# Patient Record
Sex: FEMALE | Race: WHITE | Employment: OTHER | URBAN - METROPOLITAN AREA
[De-identification: names, ages, dates, MRNs, and addresses within clinical notes are randomized per-mention and may not be internally consistent; named-entity substitution may affect disease eponyms.]

---

## 2019-01-10 ENCOUNTER — HOSPITAL ENCOUNTER (EMERGENCY)
Age: 59
Discharge: HOME | End: 2019-01-10
Attending: EMERGENCY MEDICINE
Payer: COMMERCIAL

## 2019-01-10 VITALS
SYSTOLIC BLOOD PRESSURE: 138 MMHG | HEIGHT: 62 IN | DIASTOLIC BLOOD PRESSURE: 89 MMHG | OXYGEN SATURATION: 98 % | HEART RATE: 75 BPM | BODY MASS INDEX: 36.8 KG/M2 | WEIGHT: 200 LBS | RESPIRATION RATE: 18 BRPM | TEMPERATURE: 98.2 F

## 2019-01-10 DIAGNOSIS — K05.10 GINGIVITIS: ICD-10-CM

## 2019-01-10 DIAGNOSIS — K04.7 DENTAL ABSCESS: Primary | ICD-10-CM

## 2019-01-10 DIAGNOSIS — K02.9 DENTAL DECAY: ICD-10-CM

## 2019-01-10 DIAGNOSIS — H92.09 OTALGIA, UNSPECIFIED LATERALITY: ICD-10-CM

## 2019-01-10 PROCEDURE — 6370000000 HC RX 637 (ALT 250 FOR IP): Performed by: EMERGENCY MEDICINE

## 2019-01-10 PROCEDURE — 99283 EMERGENCY DEPT VISIT LOW MDM: CPT

## 2019-01-10 RX ORDER — LOSARTAN POTASSIUM 50 MG/1
50 TABLET ORAL DAILY
COMMUNITY

## 2019-01-10 RX ORDER — HYDROCHLOROTHIAZIDE 25 MG/1
12.5 TABLET ORAL DAILY
COMMUNITY
End: 2021-02-16

## 2019-01-10 RX ORDER — OXYCODONE HYDROCHLORIDE AND ACETAMINOPHEN 5; 325 MG/1; MG/1
1 TABLET ORAL ONCE
Status: COMPLETED | OUTPATIENT
Start: 2019-01-10 | End: 2019-01-10

## 2019-01-10 RX ORDER — CLINDAMYCIN HYDROCHLORIDE 300 MG/1
300 CAPSULE ORAL 4 TIMES DAILY
Qty: 40 CAPSULE | Refills: 0 | Status: SHIPPED | OUTPATIENT
Start: 2019-01-10 | End: 2019-01-20

## 2019-01-10 RX ORDER — OXYCODONE HYDROCHLORIDE AND ACETAMINOPHEN 5; 325 MG/1; MG/1
1-2 TABLET ORAL EVERY 6 HOURS PRN
Qty: 20 TABLET | Refills: 0 | Status: SHIPPED | OUTPATIENT
Start: 2019-01-10 | End: 2019-01-13

## 2019-01-10 RX ORDER — CLINDAMYCIN HYDROCHLORIDE 150 MG/1
300 CAPSULE ORAL ONCE
Status: COMPLETED | OUTPATIENT
Start: 2019-01-10 | End: 2019-01-10

## 2019-01-10 RX ORDER — CHLORHEXIDINE GLUCONATE 0.12 MG/ML
15 RINSE ORAL 2 TIMES DAILY
Qty: 420 ML | Refills: 0 | Status: SHIPPED | OUTPATIENT
Start: 2019-01-10 | End: 2019-01-24

## 2019-01-10 RX ORDER — ATORVASTATIN CALCIUM 40 MG/1
40 TABLET, FILM COATED ORAL DAILY
COMMUNITY

## 2019-01-10 RX ORDER — LANOLIN ALCOHOL/MO/W.PET/CERES
1000 CREAM (GRAM) TOPICAL DAILY
COMMUNITY

## 2019-01-10 RX ADMIN — CLINDAMYCIN HYDROCHLORIDE 300 MG: 150 CAPSULE ORAL at 20:03

## 2019-01-10 RX ADMIN — OXYCODONE HYDROCHLORIDE AND ACETAMINOPHEN 1 TABLET: 5; 325 TABLET ORAL at 20:03

## 2019-01-10 ASSESSMENT — PAIN DESCRIPTION - ORIENTATION
ORIENTATION: RIGHT
ORIENTATION: RIGHT

## 2019-01-10 ASSESSMENT — PAIN SCALES - GENERAL
PAINLEVEL_OUTOF10: 4
PAINLEVEL_OUTOF10: 5
PAINLEVEL_OUTOF10: 5

## 2019-01-10 ASSESSMENT — PAIN DESCRIPTION - DESCRIPTORS
DESCRIPTORS: ACHING
DESCRIPTORS: CONSTANT;ACHING

## 2019-01-10 ASSESSMENT — ENCOUNTER SYMPTOMS
EYE PAIN: 0
SORE THROAT: 0
RHINORRHEA: 0
CONSTIPATION: 0
WHEEZING: 0
FACIAL SWELLING: 1
COLOR CHANGE: 0
SHORTNESS OF BREATH: 0
ABDOMINAL DISTENTION: 0
DIARRHEA: 0
BACK PAIN: 0
SINUS PRESSURE: 0
PHOTOPHOBIA: 0
APNEA: 0
COUGH: 0
NAUSEA: 0
ABDOMINAL PAIN: 0
VOMITING: 0

## 2019-01-10 ASSESSMENT — PAIN - FUNCTIONAL ASSESSMENT: PAIN_FUNCTIONAL_ASSESSMENT: 0-10

## 2019-01-10 ASSESSMENT — PAIN DESCRIPTION - ONSET
ONSET: ON-GOING
ONSET: ON-GOING

## 2019-01-10 ASSESSMENT — PAIN DESCRIPTION - PAIN TYPE
TYPE: ACUTE PAIN
TYPE: ACUTE PAIN

## 2019-01-10 ASSESSMENT — PAIN DESCRIPTION - PROGRESSION
CLINICAL_PROGRESSION: GRADUALLY WORSENING
CLINICAL_PROGRESSION: GRADUALLY IMPROVING

## 2019-01-10 ASSESSMENT — PAIN DESCRIPTION - FREQUENCY
FREQUENCY: CONTINUOUS
FREQUENCY: CONTINUOUS

## 2019-01-10 ASSESSMENT — PAIN DESCRIPTION - LOCATION
LOCATION: EAR;JAW
LOCATION: TEETH

## 2019-04-24 ENCOUNTER — HOSPITAL ENCOUNTER (OUTPATIENT)
Dept: WOMENS IMAGING | Age: 59
Discharge: HOME | End: 2019-04-26
Payer: COMMERCIAL

## 2019-04-24 DIAGNOSIS — Z12.31 ENCOUNTER FOR SCREENING MAMMOGRAM FOR BREAST CANCER: ICD-10-CM

## 2019-04-24 PROCEDURE — 77067 SCR MAMMO BI INCL CAD: CPT

## 2019-05-05 ENCOUNTER — HOSPITAL ENCOUNTER (EMERGENCY)
Age: 59
Discharge: HOME | End: 2019-05-05
Attending: EMERGENCY MEDICINE
Payer: COMMERCIAL

## 2019-05-05 ENCOUNTER — APPOINTMENT (OUTPATIENT)
Dept: GENERAL RADIOLOGY | Age: 59
End: 2019-05-05
Payer: COMMERCIAL

## 2019-05-05 VITALS
BODY MASS INDEX: 34.96 KG/M2 | OXYGEN SATURATION: 95 % | SYSTOLIC BLOOD PRESSURE: 119 MMHG | DIASTOLIC BLOOD PRESSURE: 61 MMHG | HEIGHT: 62 IN | WEIGHT: 190 LBS | RESPIRATION RATE: 18 BRPM | HEART RATE: 73 BPM | TEMPERATURE: 98.3 F

## 2019-05-05 DIAGNOSIS — N39.0 ACUTE UTI: ICD-10-CM

## 2019-05-05 DIAGNOSIS — E08.42 DIABETIC POLYNEUROPATHY ASSOCIATED WITH DIABETES MELLITUS DUE TO UNDERLYING CONDITION (HCC): Primary | ICD-10-CM

## 2019-05-05 LAB
ALBUMIN SERPL-MCNC: 4.3 G/DL (ref 3.5–4.6)
ALP BLD-CCNC: 54 U/L (ref 40–130)
ALT SERPL-CCNC: 33 U/L (ref 0–33)
ANION GAP SERPL CALCULATED.3IONS-SCNC: 16 MEQ/L (ref 9–15)
AST SERPL-CCNC: 34 U/L (ref 0–35)
BACTERIA: ABNORMAL /HPF
BASOPHILS ABSOLUTE: 0 K/UL (ref 0–0.2)
BASOPHILS RELATIVE PERCENT: 0.4 %
BILIRUB SERPL-MCNC: 0.9 MG/DL (ref 0.2–0.7)
BILIRUBIN URINE: NEGATIVE
BLOOD, URINE: ABNORMAL
BUN BLDV-MCNC: 23 MG/DL (ref 8–23)
CALCIUM SERPL-MCNC: 10 MG/DL (ref 8.5–9.9)
CHLORIDE BLD-SCNC: 100 MEQ/L (ref 95–107)
CLARITY: CLEAR
CO2: 25 MEQ/L (ref 20–31)
COLOR: YELLOW
CREAT SERPL-MCNC: 0.61 MG/DL (ref 0.5–0.9)
EOSINOPHILS ABSOLUTE: 0.1 K/UL (ref 0–0.7)
EOSINOPHILS RELATIVE PERCENT: 3 %
EPITHELIAL CELLS, UA: ABNORMAL /HPF
GFR AFRICAN AMERICAN: >60
GFR NON-AFRICAN AMERICAN: >60
GLOBULIN: 3.7 G/DL (ref 2.3–3.5)
GLUCOSE BLD-MCNC: 147 MG/DL (ref 70–99)
GLUCOSE URINE: NEGATIVE MG/DL
HCT VFR BLD CALC: 43.3 % (ref 37–47)
HEMOGLOBIN: 14.4 G/DL (ref 12–16)
KETONES, URINE: NEGATIVE MG/DL
LEUKOCYTE ESTERASE, URINE: ABNORMAL
LYMPHOCYTES ABSOLUTE: 1.2 K/UL (ref 1–4.8)
LYMPHOCYTES RELATIVE PERCENT: 27.6 %
MAGNESIUM: 2.2 MG/DL (ref 1.7–2.4)
MCH RBC QN AUTO: 29.5 PG (ref 27–31.3)
MCHC RBC AUTO-ENTMCNC: 33.3 % (ref 33–37)
MCV RBC AUTO: 88.7 FL (ref 82–100)
MONOCYTES ABSOLUTE: 0.5 K/UL (ref 0.2–0.8)
MONOCYTES RELATIVE PERCENT: 11.7 %
NEUTROPHILS ABSOLUTE: 2.6 K/UL (ref 1.4–6.5)
NEUTROPHILS RELATIVE PERCENT: 57.3 %
NITRITE, URINE: NEGATIVE
PDW BLD-RTO: 13.3 % (ref 11.5–14.5)
PH UA: 6 (ref 5–9)
PLATELET # BLD: 226 K/UL (ref 130–400)
POTASSIUM SERPL-SCNC: 3.6 MEQ/L (ref 3.4–4.9)
PROTEIN UA: 30 MG/DL
RBC # BLD: 4.88 M/UL (ref 4.2–5.4)
RBC UA: ABNORMAL /HPF (ref 0–2)
SODIUM BLD-SCNC: 141 MEQ/L (ref 135–144)
SPECIFIC GRAVITY UA: 1.02 (ref 1–1.03)
TOTAL PROTEIN: 8 G/DL (ref 6.3–8)
URINE REFLEX TO CULTURE: YES
UROBILINOGEN, URINE: 0.2 E.U./DL
WBC # BLD: 4.5 K/UL (ref 4.8–10.8)
WBC UA: ABNORMAL /HPF (ref 0–5)

## 2019-05-05 PROCEDURE — 36415 COLL VENOUS BLD VENIPUNCTURE: CPT

## 2019-05-05 PROCEDURE — 81001 URINALYSIS AUTO W/SCOPE: CPT

## 2019-05-05 PROCEDURE — 80053 COMPREHEN METABOLIC PANEL: CPT

## 2019-05-05 PROCEDURE — 87086 URINE CULTURE/COLONY COUNT: CPT

## 2019-05-05 PROCEDURE — 2580000003 HC RX 258: Performed by: EMERGENCY MEDICINE

## 2019-05-05 PROCEDURE — 96374 THER/PROPH/DIAG INJ IV PUSH: CPT

## 2019-05-05 PROCEDURE — 6360000002 HC RX W HCPCS: Performed by: EMERGENCY MEDICINE

## 2019-05-05 PROCEDURE — 99284 EMERGENCY DEPT VISIT MOD MDM: CPT

## 2019-05-05 PROCEDURE — 85025 COMPLETE CBC W/AUTO DIFF WBC: CPT

## 2019-05-05 PROCEDURE — 83735 ASSAY OF MAGNESIUM: CPT

## 2019-05-05 PROCEDURE — 96375 TX/PRO/DX INJ NEW DRUG ADDON: CPT

## 2019-05-05 PROCEDURE — 72110 X-RAY EXAM L-2 SPINE 4/>VWS: CPT

## 2019-05-05 RX ORDER — KETOROLAC TROMETHAMINE 30 MG/ML
30 INJECTION, SOLUTION INTRAMUSCULAR; INTRAVENOUS ONCE
Status: COMPLETED | OUTPATIENT
Start: 2019-05-05 | End: 2019-05-05

## 2019-05-05 RX ORDER — NITROFURANTOIN 25; 75 MG/1; MG/1
100 CAPSULE ORAL 2 TIMES DAILY
Qty: 14 CAPSULE | Refills: 0 | Status: SHIPPED | OUTPATIENT
Start: 2019-05-05 | End: 2019-05-09

## 2019-05-05 RX ORDER — CLOPIDOGREL BISULFATE 75 MG/1
75 TABLET ORAL DAILY
COMMUNITY
End: 2019-11-10

## 2019-05-05 RX ORDER — 0.9 % SODIUM CHLORIDE 0.9 %
1000 INTRAVENOUS SOLUTION INTRAVENOUS ONCE
Status: COMPLETED | OUTPATIENT
Start: 2019-05-05 | End: 2019-05-05

## 2019-05-05 RX ORDER — AMLODIPINE BESYLATE 5 MG/1
5 TABLET ORAL DAILY
COMMUNITY

## 2019-05-05 RX ORDER — METHYLPREDNISOLONE SODIUM SUCCINATE 125 MG/2ML
125 INJECTION, POWDER, LYOPHILIZED, FOR SOLUTION INTRAMUSCULAR; INTRAVENOUS ONCE
Status: COMPLETED | OUTPATIENT
Start: 2019-05-05 | End: 2019-05-05

## 2019-05-05 RX ORDER — GABAPENTIN 100 MG/1
100 CAPSULE ORAL 3 TIMES DAILY
Qty: 90 CAPSULE | Refills: 0 | Status: SHIPPED | OUTPATIENT
Start: 2019-05-05 | End: 2019-11-10

## 2019-05-05 RX ADMIN — METHYLPREDNISOLONE SODIUM SUCCINATE 125 MG: 125 INJECTION, POWDER, FOR SOLUTION INTRAMUSCULAR; INTRAVENOUS at 13:48

## 2019-05-05 RX ADMIN — KETOROLAC TROMETHAMINE 30 MG: 30 INJECTION, SOLUTION INTRAMUSCULAR at 13:44

## 2019-05-05 RX ADMIN — SODIUM CHLORIDE 1000 ML: 9 INJECTION, SOLUTION INTRAVENOUS at 13:44

## 2019-05-05 ASSESSMENT — PAIN - FUNCTIONAL ASSESSMENT
PAIN_FUNCTIONAL_ASSESSMENT: ACTIVITIES ARE NOT PREVENTED
PAIN_FUNCTIONAL_ASSESSMENT: PREVENTS OR INTERFERES SOME ACTIVE ACTIVITIES AND ADLS
PAIN_FUNCTIONAL_ASSESSMENT: 0-10

## 2019-05-05 ASSESSMENT — ENCOUNTER SYMPTOMS
VOMITING: 0
SHORTNESS OF BREATH: 0
PHOTOPHOBIA: 0
COLOR CHANGE: 0
SINUS PRESSURE: 0
COUGH: 0
CONSTIPATION: 0
ABDOMINAL PAIN: 0
WHEEZING: 0
NAUSEA: 0
ABDOMINAL DISTENTION: 0
BACK PAIN: 0
DIARRHEA: 0
EYE PAIN: 0
APNEA: 0
SORE THROAT: 0
RHINORRHEA: 0

## 2019-05-05 ASSESSMENT — PAIN DESCRIPTION - PROGRESSION
CLINICAL_PROGRESSION: GRADUALLY IMPROVING
CLINICAL_PROGRESSION: NOT CHANGED

## 2019-05-05 ASSESSMENT — PAIN DESCRIPTION - LOCATION
LOCATION: FOOT
LOCATION: FOOT

## 2019-05-05 ASSESSMENT — PAIN DESCRIPTION - FREQUENCY
FREQUENCY: CONTINUOUS
FREQUENCY: CONTINUOUS

## 2019-05-05 ASSESSMENT — PAIN DESCRIPTION - PAIN TYPE
TYPE: ACUTE PAIN
TYPE: ACUTE PAIN

## 2019-05-05 ASSESSMENT — PAIN DESCRIPTION - ORIENTATION
ORIENTATION: RIGHT;LEFT
ORIENTATION: RIGHT;LEFT;POSTERIOR

## 2019-05-05 ASSESSMENT — PAIN DESCRIPTION - DESCRIPTORS
DESCRIPTORS: DULL;NUMBNESS
DESCRIPTORS: NUMBNESS;DULL

## 2019-05-05 ASSESSMENT — PAIN DESCRIPTION - ONSET
ONSET: ON-GOING
ONSET: ON-GOING

## 2019-05-05 ASSESSMENT — PAIN SCALES - GENERAL
PAINLEVEL_OUTOF10: 3
PAINLEVEL_OUTOF10: 5

## 2019-05-05 NOTE — ED NOTES
Patient presents with bilateral foot numbness started a few months ago and has increased over the past 3 days. The patient does have some hard nodules noted at the tendon posterior bilateral heels. These are semi firm and moves slightly when touched. No redness or swelling of feet noted. Good pedal and post tib pulses bilaterally. Patient changed to gown and positioned for comfort. Call light within reach.       Micaela Tamez RN  05/05/19 9773

## 2019-05-05 NOTE — ED PROVIDER NOTES
[address] [place] [address] ED  eMERGENCY dEPARTMENT eNCOUnter      Pt Name: Jenna Soria  MRN: [id number]  [place] 1960  Date of evaluation: 5/5/2019  Provider: Radha Veliz MD    [address] [place] [address]       Chief Complaint   Patient presents with    Numbness     biateral feet numbness         HISTORY OF PRESENT ILLNESS   (Location/Symptom, Timing/Onset,Context/Setting, Quality, Duration, Modifying Factors, Severity)  Note limiting factors. Jenna Soria is a 62 y.o. female who presents to the emergency department with complaint of bilateral feet numbness. Patient of long standing type 2 diabetes, hypertension, hyperlipidemia, CVA and asthma. Has had numbness and feet pain for over 6 months. Pain is 4 in a scale of 1 to 10. HPI    Nursing Notes were reviewed. REVIEW OF SYSTEMS    (2-9 systems for level 4, 10 or more for level 5)     Review of Systems   Constitutional: Negative. Negative for activity change, appetite change, chills, fatigue and fever. HENT: Negative for congestion, ear discharge, ear pain, hearing loss, rhinorrhea, sinus pressure and sore throat. Eyes: Negative for photophobia, pain and visual disturbance. Respiratory: Negative for apnea, cough, shortness of breath and wheezing. Cardiovascular: Negative for chest pain, palpitations and leg swelling. Gastrointestinal: Negative for abdominal distention, abdominal pain, constipation, diarrhea, nausea and vomiting. Endocrine: Negative for cold intolerance, heat intolerance and polyuria. Genitourinary: Negative for dysuria, flank pain, frequency and urgency. Musculoskeletal: Negative for arthralgias, back pain, gait problem, myalgias and neck stiffness. Skin: Negative for color change, pallor and rash. Allergic/Immunologic: Negative for food allergies and immunocompromised state. Neurological: Positive for numbness. Negative for dizziness, tremors, syncope, weakness, light-headedness and headaches.    Hematological: Negative for adenopathy. Does not bruise/bleed easily. Psychiatric/Behavioral: Negative for agitation, confusion and hallucinations. All other systems reviewed and are negative. Except as noted above the remainder of the review of systems was reviewed and negative. PAST MEDICAL HISTORY     Past Medical History:   Diagnosis Date    Asthma     [facility]erebral artery occlusion with [facility] infarction ([facility] [address]     Diabetes mellitus ([facility] [address]     Hyperlipidemia     Hypertension          SURGICAL HISTORY       Past Surgical History:   Procedure Laterality Date    CARPAL TUNNEL RELEASE Bilateral     DILATION AND CURETTAGE OF UTERUS      TUBAL LIGATION           CURRENT MEDICATIONS       Previous Medications    AMLODIPINE (NORVASC) 5 MG TABLET    Take 5 mg by mouth daily    ASPIRIN 81 MG TABLET    Take 81 mg by mouth daily    ATORVASTATIN (LIPITOR) 40 MG TABLET    Take 40 mg by mouth daily    CLOPIDOGREL (PLAVIX) 75 MG TABLET    Take 75 mg by mouth daily    HYDROCHLOROTHIAZIDE (HYDRODIURIL) 25 MG TABLET    Take 12.5 mg by mouth daily     LOSARTAN (COZAAR) 50 MG TABLET    Take 50 mg by mouth daily    METFORMIN (GLUCOPHAGE) 500 MG TABLET    Take 500 mg by mouth 2 times daily (with meals)    NEOMYCIN-POLYMYXIN-HYDROCORTISONE (CORTISPORIN) 3.5-12735-1 OTIC SOLUTION    3 drops 4 times daily    OMEGA-3 FATTY ACIDS (OMEGA 3 500 PO)    Take by mouth    SERTRALINE (ZOLOFT) 50 MG TABLET    Take 50 mg by mouth daily    VITAMIN B-12 (CYANOCOBALAMIN) 1000 MCG TABLET    Take 1,000 mcg by mouth daily    VITAMIN D (CHOLECALCIFEROL) 1000 UNIT TABS TABLET    Take 1,000 Units by mouth daily       ALLERGIES         FAMILY HISTORY     History reviewed. No pertinent family history.        SOCIAL HISTORY       Social History     Socioeconomic History    Marital status:      Spouse name: None    Number of children: None    Years of education: None    Highest education level: None   Occupational History    None   Social Needs  Financial resource strain: None    Food insecurity:     Worry: None     Inability: None    Transportation needs:     Medical: None     Non-medical: None   Tobacco Use    Smoking status: Never Smoker    Smokeless tobacco: Never Used   Substance and Sexual Activity    Alcohol use: No    Drug use: No    Sexual activity: Yes     Partners: Male   Lifestyle    Physical activity:     Days per week: None     Minutes per session: None    Stress: None   Relationships    Social connections:     Talks on phone: None     Gets together: None     Attends Quaker service: None     Active member of club or organization: None     Attends meetings of clubs or organizations: None     Relationship status: None    Intimate partner violence:     Fear of current or ex partner: None     Emotionally abused: None     Physically abused: None     Forced sexual activity: None   Other Topics Concern    None   Social History Narrative    None       SCREENINGS             PHYSICAL EXAM    (up to 7 for level 4, 8 or more for level 5)     ED Triage Vitals [05/05/19 1322]   BP Temp Temp Source Pulse Resp SpO2 Height Weight   (!) 121/59 98.3 °F (36.8 °C) Oral 89 18 -- 5' 2\" (1.575 m) 190 lb (86.2 kg)       Physical Exam   Constitutional: She is oriented to person, place, and time. She appears well-developed and well-nourished. No distress. HENT:   Head: Normocephalic and atraumatic. Nose: Nose normal.   Mouth/Throat: Oropharynx is clear and moist. No oropharyngeal exudate. Eyes: Pupils are equal, round, and reactive to light. Conjunctivae and EOM are normal. Right eye exhibits no discharge. Left eye exhibits no discharge. No scleral icterus. Neck: Normal range of motion. Neck supple. No JVD present. No tracheal deviation present. No thyromegaly present. Cardiovascular: Normal rate, regular rhythm, normal heart sounds and intact distal pulses. Exam reveals no gallop and no friction rub. No murmur heard.   Pulmonary/Chest: Effort normal and breath sounds normal. No stridor. No respiratory distress. She has no wheezes. She has no rales. She exhibits no tenderness. Abdominal: Soft. Bowel sounds are normal. She exhibits no distension and no mass. There is no tenderness. There is no rebound and no guarding. Musculoskeletal: Normal range of motion. She exhibits no edema, tenderness or deformity. Lymphadenopathy:     She has no cervical adenopathy. Neurological: She is alert and oriented to person, place, and time. She has normal reflexes. She displays normal reflexes. No cranial nerve deficit. She exhibits normal muscle tone. Coordination normal.   Skin: Skin is warm and dry. No rash noted. She is not diaphoretic. No erythema. No pallor. Psychiatric: She has a normal mood and affect. Her behavior is normal. Judgment and thought content normal.   Nursing note and vitals reviewed.       DIAGNOSTIC RESULTS     EKG: All EKG's are interpreted by the Emergency Department Physician who either signs or Co-signs this chart in the absence of a cardiologist.        RADIOLOGY:   Non-plain film images such as CT, Ultrasound and MRI are read by the radiologist. Geraldine Borders radiographicimages are visualized and preliminarily interpreted by the emergency physician with the below findings:        Interpretation per the Radiologist below, if available at the time of this note:    XR LUMBAR SPINE (MIN 4 VIEWS)    (Results Pending)         ED BEDSIDE ULTRASOUND:   Performed by ED Physician - none    LABS:  Labs Reviewed   COMPREHENSIVE METABOLIC PANEL - Abnormal; Notable for the following components:       Result Value    Anion Gap 16 (*)     Glucose 147 (*)     Calcium 10.0 (*)     Total Bilirubin 0.9 (*)     Globulin 3.7 (*)     All other components within normal limits   CBC WITH AUTO DIFFERENTIAL - Abnormal; Notable for the following components:    WBC 4.5 (*)     All other components within normal limits   URINE RT REFLEX TO CULTURE - Abnormal; Notable for the following components:    Protein, UA 30 (*)     All other components within normal limits   MICROSCOPIC URINALYSIS - Abnormal; Notable for the following components:    RBC, UA 3-5 (*)     All other components within normal limits   URINE CULTURE   MAGNESIUM       All other labs were within normal range or not returned as of this dictation. EMERGENCY DEPARTMENT COURSE and DIFFERENTIALDIAGNOSIS/MDM:   Vitals:    Vitals:    05/05/19 1322   BP: (!) 121/59   Pulse: 89   Resp: 18   Temp: 98.3 °F (36.8 °C)   TempSrc: Oral   Weight: 190 lb (86.2 kg)   Height: 5' 2\" (1.575 m)           MDM  Number of Diagnoses or Management Options     Amount and/or Complexity of Data Reviewed  Clinical lab tests: reviewed and ordered  Tests in the radiology section of CPT®: reviewed and ordered    Risk of Complications, Morbidity, and/or Mortality  Presenting problems: moderate  Diagnostic procedures: moderate  Management options: moderate    Patient Progress  Patient progress: improved      CRITICAL CARE TIME   Total Critical Care time was  minutes, excluding separately reportable procedures. There was a high probability of clinically significant/life threatening deterioration in the patient's condition which required my urgentintervention. SULTS:  None    PROCEDURES:  Unless otherwise noted below, none     Procedures    FINAL IMPRESSION      1. Diabetic polyneuropathy associated with diabetes mellitus due to underlying condition ([facility] [address]    2. Acute UTI          DISPOSITION/PLAN   DISPOSITION Decision To Discharge 05/05/2019 02:30:07 PM      PATIENT REFERRED TO:  Adrian Toussaint MD  [address] [facility] [phone]    In 3 days      Radha Galvez DPM  [address] [facility]  [facility] [address]  [phone]    In 3 days        DISCHARGE MEDICATIONS:  New Prescriptions    GABAPENTIN (NEURONTIN) 100 MG CAPSULE    Take 1 capsule by mouth 3 times daily for 30 days.  Intended supply: 30 days NITROFURANTOIN, MACROCRYSTAL-MONOHYDRATE, (MACROBID) 100 MG CAPSULE    Take 1 capsule by mouth 2 times daily for 7 doses          (Please note that portions of this note were completed with a voice recognitionprogram.  Efforts were made to edit the dictations but occasionally words are mis-transcribed.)    Brody Barreto MD (electronically signed)  Attending Emergency Physician          Brody Barreto MD  05/05/19 7000

## 2019-05-07 LAB — URINE CULTURE, ROUTINE: NORMAL

## 2019-06-18 ENCOUNTER — APPOINTMENT (OUTPATIENT)
Dept: GENERAL RADIOLOGY | Age: 59
End: 2019-06-18
Payer: COMMERCIAL

## 2019-06-18 ENCOUNTER — APPOINTMENT (OUTPATIENT)
Dept: CT IMAGING | Age: 59
End: 2019-06-18
Payer: COMMERCIAL

## 2019-06-18 ENCOUNTER — HOSPITAL ENCOUNTER (EMERGENCY)
Age: 59
Discharge: HOME | End: 2019-06-18
Attending: EMERGENCY MEDICINE
Payer: COMMERCIAL

## 2019-06-18 VITALS
OXYGEN SATURATION: 95 % | HEIGHT: 63 IN | TEMPERATURE: 99 F | RESPIRATION RATE: 18 BRPM | WEIGHT: 196 LBS | SYSTOLIC BLOOD PRESSURE: 135 MMHG | BODY MASS INDEX: 34.73 KG/M2 | DIASTOLIC BLOOD PRESSURE: 63 MMHG | HEART RATE: 78 BPM

## 2019-06-18 DIAGNOSIS — R55 NEAR SYNCOPE: Primary | ICD-10-CM

## 2019-06-18 DIAGNOSIS — M54.81 OCCIPITAL NEURALGIA OF RIGHT SIDE: ICD-10-CM

## 2019-06-18 LAB
ALBUMIN SERPL-MCNC: 4.3 G/DL (ref 3.5–4.6)
ALP BLD-CCNC: 52 U/L (ref 40–130)
ALT SERPL-CCNC: 36 U/L (ref 0–33)
ANION GAP SERPL CALCULATED.3IONS-SCNC: 13 MEQ/L (ref 9–15)
APTT: 31 SEC (ref 21.6–35.4)
AST SERPL-CCNC: 36 U/L (ref 0–35)
BACTERIA: NORMAL /HPF
BILIRUB SERPL-MCNC: 0.9 MG/DL (ref 0.2–0.7)
BILIRUBIN URINE: NEGATIVE
BLOOD, URINE: NORMAL
BUN BLDV-MCNC: 20 MG/DL (ref 8–23)
CALCIUM SERPL-MCNC: 10.5 MG/DL (ref 8.5–9.9)
CHLORIDE BLD-SCNC: 100 MEQ/L (ref 95–107)
CLARITY: CLEAR
CO2: 29 MEQ/L (ref 20–31)
COLOR: YELLOW
CREAT SERPL-MCNC: 0.68 MG/DL (ref 0.5–0.9)
EKG ATRIAL RATE: 78 BPM
EKG P AXIS: 57 DEGREES
EKG P-R INTERVAL: 194 MS
EKG Q-T INTERVAL: 400 MS
EKG QRS DURATION: 96 MS
EKG QTC CALCULATION (BAZETT): 456 MS
EKG R AXIS: 20 DEGREES
EKG T AXIS: 48 DEGREES
EKG VENTRICULAR RATE: 78 BPM
EPITHELIAL CELLS, UA: NORMAL /HPF
GFR AFRICAN AMERICAN: >60
GFR NON-AFRICAN AMERICAN: >60
GLOBULIN: 3.4 G/DL (ref 2.3–3.5)
GLUCOSE BLD-MCNC: 144 MG/DL (ref 70–99)
GLUCOSE URINE: NEGATIVE MG/DL
HCT VFR BLD CALC: 42.9 % (ref 37–47)
HEMOGLOBIN: 14.4 G/DL (ref 12–16)
INR BLD: 1.1
KETONES, URINE: NEGATIVE MG/DL
LEUKOCYTE ESTERASE, URINE: NORMAL
MCH RBC QN AUTO: 29.9 PG (ref 27–31.3)
MCHC RBC AUTO-ENTMCNC: 33.5 % (ref 33–37)
MCV RBC AUTO: 89.3 FL (ref 82–100)
MUCUS: PRESENT
NITRITE, URINE: NEGATIVE
PDW BLD-RTO: 13.1 % (ref 11.5–14.5)
PH UA: 7 (ref 5–9)
PLATELET # BLD: 208 K/UL (ref 130–400)
POTASSIUM SERPL-SCNC: 3.7 MEQ/L (ref 3.4–4.9)
PROTEIN UA: NEGATIVE MG/DL
PROTHROMBIN TIME: 10.6 SEC (ref 9–11.5)
RBC # BLD: 4.81 M/UL (ref 4.2–5.4)
RBC UA: NORMAL /HPF (ref 0–2)
SODIUM BLD-SCNC: 142 MEQ/L (ref 135–144)
SPECIFIC GRAVITY UA: 1.01 (ref 1–1.03)
TOTAL PROTEIN: 7.7 G/DL (ref 6.3–8)
TROPONIN: <0.01 NG/ML (ref 0–0.01)
URINE REFLEX TO CULTURE: YES
UROBILINOGEN, URINE: 0.2 E.U./DL
WBC # BLD: 4.7 K/UL (ref 4.8–10.8)
WBC UA: NORMAL /HPF (ref 0–5)

## 2019-06-18 PROCEDURE — 85730 THROMBOPLASTIN TIME PARTIAL: CPT

## 2019-06-18 PROCEDURE — 36415 COLL VENOUS BLD VENIPUNCTURE: CPT

## 2019-06-18 PROCEDURE — 6360000002 HC RX W HCPCS: Performed by: EMERGENCY MEDICINE

## 2019-06-18 PROCEDURE — 93005 ELECTROCARDIOGRAM TRACING: CPT

## 2019-06-18 PROCEDURE — 99284 EMERGENCY DEPT VISIT MOD MDM: CPT

## 2019-06-18 PROCEDURE — 85610 PROTHROMBIN TIME: CPT

## 2019-06-18 PROCEDURE — 2580000003 HC RX 258: Performed by: EMERGENCY MEDICINE

## 2019-06-18 PROCEDURE — 71045 X-RAY EXAM CHEST 1 VIEW: CPT

## 2019-06-18 PROCEDURE — 96374 THER/PROPH/DIAG INJ IV PUSH: CPT

## 2019-06-18 PROCEDURE — 84484 ASSAY OF TROPONIN QUANT: CPT

## 2019-06-18 PROCEDURE — 85027 COMPLETE CBC AUTOMATED: CPT

## 2019-06-18 PROCEDURE — 80053 COMPREHEN METABOLIC PANEL: CPT

## 2019-06-18 PROCEDURE — 81001 URINALYSIS AUTO W/SCOPE: CPT

## 2019-06-18 PROCEDURE — 70450 CT HEAD/BRAIN W/O DYE: CPT

## 2019-06-18 PROCEDURE — 87086 URINE CULTURE/COLONY COUNT: CPT

## 2019-06-18 RX ORDER — 0.9 % SODIUM CHLORIDE 0.9 %
1000 INTRAVENOUS SOLUTION INTRAVENOUS ONCE
Status: COMPLETED | OUTPATIENT
Start: 2019-06-18 | End: 2019-06-18

## 2019-06-18 RX ORDER — IBUPROFEN 600 MG/1
600 TABLET ORAL EVERY 6 HOURS PRN
Qty: 30 TABLET | Refills: 0 | Status: ON HOLD | OUTPATIENT
Start: 2019-06-18 | End: 2019-11-10

## 2019-06-18 RX ORDER — ORPHENADRINE CITRATE 30 MG/ML
30 INJECTION INTRAMUSCULAR; INTRAVENOUS ONCE
Status: COMPLETED | OUTPATIENT
Start: 2019-06-18 | End: 2019-06-18

## 2019-06-18 RX ORDER — CYCLOBENZAPRINE HCL 10 MG
10 TABLET ORAL 3 TIMES DAILY PRN
Qty: 30 TABLET | Refills: 0 | Status: SHIPPED | OUTPATIENT
Start: 2019-06-18 | End: 2019-06-28

## 2019-06-18 RX ADMIN — ORPHENADRINE CITRATE 30 MG: 30 INJECTION INTRAMUSCULAR; INTRAVENOUS at 13:49

## 2019-06-18 RX ADMIN — SODIUM CHLORIDE 1000 ML: 9 INJECTION, SOLUTION INTRAVENOUS at 13:48

## 2019-06-18 ASSESSMENT — PAIN DESCRIPTION - DIRECTION: RADIATING_TOWARDS: NECK

## 2019-06-18 ASSESSMENT — ENCOUNTER SYMPTOMS
WHEEZING: 0
RHINORRHEA: 0
ALLERGIC/IMMUNOLOGIC NEGATIVE: 1
VOMITING: 0
EYES NEGATIVE: 1
TROUBLE SWALLOWING: 0
NAUSEA: 0
SHORTNESS OF BREATH: 0
ABDOMINAL PAIN: 0

## 2019-06-18 ASSESSMENT — PAIN DESCRIPTION - PAIN TYPE: TYPE: ACUTE PAIN

## 2019-06-18 ASSESSMENT — PAIN SCALES - GENERAL: PAINLEVEL_OUTOF10: 1

## 2019-06-18 ASSESSMENT — PAIN DESCRIPTION - FREQUENCY: FREQUENCY: INTERMITTENT

## 2019-06-18 ASSESSMENT — PAIN DESCRIPTION - LOCATION: LOCATION: HEAD

## 2019-06-18 NOTE — ED PROVIDER NOTES
[address] [place] [address] ED  eMERGENCY dEPARTMENT eNCOUnter      Pt Name: Demar Bautista  MRN: [id number]  [place] 1960  Date of evaluation: 6/18/2019  Provider: Tory Fry MD    [address] [place] [address]       Chief Complaint   Patient presents with    Dizziness         HISTORY OF PRESENT ILLNESS   (Location/Symptom, Timing/Onset,Context/Setting, Quality, Duration, Modifying Factors, Severity)  Note limiting factors. Demar Bautista is a 62 y.o. female who presents to the emergency department with complaints of one feeling of a sharp pain in the back of her right head. And complaint to feeling somewhat dizzy or lightheaded when she go from a kneeling or stooped or downward position to a more upright position. Patient admits the symptoms of feeling lightheadedness or presyncopal started approximately 3 days ago. Patient admits there have been no symptoms in supine position on resting position. Patient admits to feeling of things wanting to black out her feet out is how she described the dizziness. Relationship to the pain in the back of her head, patient notes no neurologic complaint. Patient admits it hurts to touch of the right posterior scalp to right posterior neck region. Patient denies neurologic symptoms. Patient denies cephalgia otherwise. Patient denies nausea vomiting shortness of breath or other constitutional symptomatologies. HPI    NursingNotes were reviewed. REVIEW OF SYSTEMS    (2-9 systems for level 4, 10 or more for level 5)     Review of Systems   Constitutional: Positive for activity change and appetite change. Negative for chills and fever. HENT: Negative for congestion, ear pain, rhinorrhea and trouble swallowing. Eyes: Negative. Respiratory: Negative for shortness of breath and wheezing. Cardiovascular: Negative for chest pain and leg swelling. Gastrointestinal: Negative for abdominal pain, nausea and vomiting. Endocrine: Negative.     Genitourinary: Negative for dysuria, frequency and hematuria. Musculoskeletal: Negative for gait problem and neck pain. Skin: Negative. Allergic/Immunologic: Negative. Neurological: Positive for light-headedness and headaches. Negative for seizures, syncope and speech difficulty. Headache patient describes as a posterior neck to head pain. She describes it is pinpoint reproducible nature at the base of the skull posteriorly. Hematological: Negative. Psychiatric/Behavioral: Negative for hallucinations, self-injury and suicidal ideas. Except as noted above the remainder of the review of systems was reviewed and negative. PAST MEDICAL HISTORY     Past Medical History:   Diagnosis Date    Asthma     Cerebral [facility] occlusion with [facility] infarction ([facility] [address]     Diabetes mellitus ([facility]     H/O heart artery stent     Hyperlipidemia     Hypertension          SURGICALHISTORY       Past Surgical History:   Procedure Laterality Date    CARPAL TUNNEL RELEASE Bilateral     DILATION AND CURETTAGE OF UTERUS      TUBAL LIGATION           CURRENT MEDICATIONS       Previous Medications    AMLODIPINE (NORVASC) 5 MG TABLET    Take 5 mg by mouth daily    ASPIRIN 81 MG TABLET    Take 81 mg by mouth daily    ATORVASTATIN (LIPITOR) 40 MG TABLET    Take 40 mg by mouth daily    CLOPIDOGREL (PLAVIX) 75 MG TABLET    Take 75 mg by mouth daily    GABAPENTIN (NEURONTIN) 100 MG CAPSULE    Take 1 capsule by mouth 3 times daily for 30 days.  Intended supply: 30 days    HYDROCHLOROTHIAZIDE (HYDRODIURIL) 25 MG TABLET    Take 12.5 mg by mouth daily     LOSARTAN (COZAAR) 50 MG TABLET    Take 50 mg by mouth daily    METFORMIN (GLUCOPHAGE) 500 MG TABLET    Take 500 mg by mouth 2 times daily (with meals)    NEOMYCIN-POLYMYXIN-HYDROCORTISONE (CORTISPORIN) 3.5-45338-8 OTIC SOLUTION    3 drops 4 times daily    OMEGA-3 FATTY ACIDS (OMEGA 3 500 PO)    Take by mouth    SERTRALINE (ZOLOFT) 50 MG TABLET    Take 50 mg by mouth daily    VITAMIN B-12 (CYANOCOBALAMIN) 1000 MCG TABLET    Take 1,000 mcg by mouth daily    VITAMIN D (CHOLECALCIFEROL) 1000 UNIT TABS TABLET    Take 1,000 Units by mouth daily       ALLERGIES     Erythromycin base; Pcn [penicillins]; Propoxyphene; Sulfamethoxazole; and Lisinopril    FAMILY HISTORY     History reviewed. No pertinent family history. SOCIAL HISTORY       Social History     Socioeconomic History    Marital status:      Spouse name: None    Number of children: None    Years of education: None    Highest education level: None   Occupational History    None   Social Needs    Financial resource strain: None    Food insecurity:     Worry: None     Inability: None    Transportation needs:     Medical: None     Non-medical: None   Tobacco Use    Smoking status: Never Smoker    Smokeless tobacco: Never Used   Substance and Sexual Activity    Alcohol use: No    Drug use: No    Sexual activity: Yes     Partners: Male   Lifestyle    Physical activity:     Days per week: None     Minutes per session: None    Stress: None   Relationships    Social connections:     Talks on phone: None     Gets together: None     Attends Christianity service: None     Active member of club or organization: None     Attends meetings of clubs or organizations: None     Relationship status: None    Intimate partner violence:     Fear of current or ex partner: None     Emotionally abused: None     Physically abused: None     Forced sexual activity: None   Other Topics Concern    None   Social History Narrative    None       SCREENINGS             PHYSICAL EXAM    (up to 7 for level 4, 8 or more for level 5)     ED Triage Vitals [06/18/19 1320]   BP Temp Temp Source Pulse Resp SpO2 Height Weight   135/63 99 °F (37.2 °C) Oral 78 18 95 % 5' 2.75\" (1.594 m) 196 lb (88.9 kg)       Physical Exam   Constitutional: She is oriented to person, place, and time. She appears well-developed and well-nourished. No distress.    HENT:   Head: Normocephalic and atraumatic. Right Ear: External ear normal.   Left Ear: External ear normal.   Mouth/Throat: No oropharyngeal exudate. Pharynx somewhat dry in nature. Eyes: Pupils are equal, round, and reactive to light. Conjunctivae and EOM are normal. Right eye exhibits no discharge. Left eye exhibits no discharge. Neck: Trachea normal, normal range of motion and full passive range of motion without pain. Neck supple. No thyromegaly present. Cardiovascular: Normal rate, regular rhythm, normal heart sounds, intact distal pulses and normal pulses. Exam reveals no gallop and no friction rub. Pulmonary/Chest: Effort normal and breath sounds normal. No stridor. No respiratory distress. She has no wheezes. Abdominal: Soft. Normal appearance and bowel sounds are normal. She exhibits no distension and no mass. There is no tenderness. There is no rebound and no guarding. Musculoskeletal: Normal range of motion. She exhibits no edema, tenderness or deformity. Neurological: She is alert and oriented to person, place, and time. No cranial nerve deficit or sensory deficit. She exhibits normal muscle tone. Coordination normal.   Skin: Skin is warm, dry and intact. No rash noted. She is not diaphoretic. No erythema. No pallor. Psychiatric: She has a normal mood and affect. Her speech is normal and behavior is normal. Judgment and thought content normal. Cognition and memory are normal.   Nursing note and vitals reviewed. DIAGNOSTIC RESULTS     EKG: All EKG's are interpreted by the Emergency Department Physician who either signs or Co-signsthis chart in the absence of a cardiologist.    Rozann Blades demonstrates sinus rhythm. Rate is 78. There is no evidence of ectopy or ischemia. Is largely unremarkable EKG.     RADIOLOGY:   Non-plain filmimages such as CT, Ultrasound and MRI are read by the radiologist. Plain radiographic images are visualized and preliminarily interpreted by the emergency physician with the below findings:    Imaging of the brain is interpreted radiologist unremarkable for evidence of acute pathology. Interpretation per the Radiologist below, if available at the time ofthis note:    CT Head WO Contrast   Final Result   No acute hemorrhage or extra-axial fluid collection is seen. Mild chronic sinus disease is seen. All CT scans at this facility use dose modulation, iterative reconstruction, and/or weight based dosing when appropriate to reduce radiation dose to as low as reasonably achievable. XR CHEST PORTABLE    (Results Pending)         ED BEDSIDE ULTRASOUND:   Performed by ED Physician - none    LABS:  Labs Reviewed   CBC - Abnormal; Notable for the following components:       Result Value    WBC 4.7 (*)     All other components within normal limits   COMPREHENSIVE METABOLIC PANEL - Abnormal; Notable for the following components:    Glucose 144 (*)     Calcium 10.5 (*)     Total Bilirubin 0.9 (*)     ALT 36 (*)     AST 36 (*)     All other components within normal limits   TROPONIN   APTT   PROTIME-INR   URINE RT REFLEX TO CULTURE       All other labs were within normal range or not returned as of this dictation. EMERGENCY DEPARTMENT COURSE and DIFFERENTIAL DIAGNOSIS/MDM:   Vitals:    Vitals:    06/18/19 1320   BP: 135/63   Pulse: 78   Resp: 18   Temp: 99 °F (37.2 °C)   TempSrc: Oral   SpO2: 95%   Weight: 196 lb (88.9 kg)   Height: 5' 2.75\" (1.594 m)       She remained stable emergency department. There is negative orthostatics. Patient provided with fluid hydration doing much better from the presyncope standpoint. EKG, chest x-ray, laboratory analysis is within normal limits including troponins. Relation to the posterior septal pain, patient notes some degree of improvement with Norflex. Patient's neurologic examination is unremarkable. Patient symptomatology is pinpoint really at the nuchal ridge.   Advised on the potential for muscular etiology versus development of

## 2019-06-18 NOTE — ED NOTES
Patient ambulated with EMT P around department with no difficulty.      Vel Eckert RN  06/18/19 5535

## 2019-06-20 LAB — URINE CULTURE, ROUTINE: NORMAL

## 2019-11-10 ENCOUNTER — HOSPITAL ENCOUNTER (OUTPATIENT)
Age: 59
Setting detail: OBSERVATION
Discharge: HOME | End: 2019-11-11
Attending: INTERNAL MEDICINE | Admitting: INTERNAL MEDICINE
Payer: COMMERCIAL

## 2019-11-10 ENCOUNTER — APPOINTMENT (OUTPATIENT)
Dept: GENERAL RADIOLOGY | Age: 59
End: 2019-11-10
Payer: COMMERCIAL

## 2019-11-10 ENCOUNTER — HOSPITAL ENCOUNTER (EMERGENCY)
Age: 59
Discharge: ANOTHER HOSPITAL | End: 2019-11-10
Attending: EMERGENCY MEDICINE
Payer: COMMERCIAL

## 2019-11-10 VITALS
HEART RATE: 62 BPM | RESPIRATION RATE: 18 BRPM | TEMPERATURE: 98.3 F | HEIGHT: 62 IN | SYSTOLIC BLOOD PRESSURE: 116 MMHG | DIASTOLIC BLOOD PRESSURE: 61 MMHG | WEIGHT: 198 LBS | OXYGEN SATURATION: 97 % | BODY MASS INDEX: 36.44 KG/M2

## 2019-11-10 DIAGNOSIS — R94.31 ACUTE ELECTROCARDIOGRAM CHANGES: ICD-10-CM

## 2019-11-10 DIAGNOSIS — M79.602 LEFT ARM PAIN: Primary | ICD-10-CM

## 2019-11-10 PROBLEM — R07.9 CHEST PAIN: Status: ACTIVE | Noted: 2019-11-10

## 2019-11-10 LAB
ALBUMIN SERPL-MCNC: 4.4 G/DL (ref 3.5–4.6)
ALP BLD-CCNC: 54 U/L (ref 40–130)
ALT SERPL-CCNC: 30 U/L (ref 0–33)
ANION GAP SERPL CALCULATED.3IONS-SCNC: 15 MEQ/L (ref 9–15)
AST SERPL-CCNC: 33 U/L (ref 0–35)
BASOPHILS ABSOLUTE: 0 K/UL (ref 0–0.2)
BASOPHILS RELATIVE PERCENT: 0.6 %
BILIRUB SERPL-MCNC: 0.8 MG/DL (ref 0.2–0.7)
BUN BLDV-MCNC: 20 MG/DL (ref 8–23)
CALCIUM SERPL-MCNC: 9.8 MG/DL (ref 8.5–9.9)
CHLORIDE BLD-SCNC: 101 MEQ/L (ref 95–107)
CO2: 24 MEQ/L (ref 20–31)
CREAT SERPL-MCNC: 0.7 MG/DL (ref 0.5–0.9)
EKG ATRIAL RATE: 72 BPM
EKG P AXIS: 36 DEGREES
EKG P-R INTERVAL: 170 MS
EKG Q-T INTERVAL: 454 MS
EKG QRS DURATION: 162 MS
EKG QTC CALCULATION (BAZETT): 497 MS
EKG R AXIS: -16 DEGREES
EKG T AXIS: 85 DEGREES
EKG VENTRICULAR RATE: 72 BPM
EOSINOPHILS ABSOLUTE: 0.2 K/UL (ref 0–0.7)
EOSINOPHILS RELATIVE PERCENT: 3.3 %
GFR AFRICAN AMERICAN: >60
GFR NON-AFRICAN AMERICAN: >60
GLOBULIN: 3.7 G/DL (ref 2.3–3.5)
GLUCOSE BLD-MCNC: 104 MG/DL (ref 60–115)
GLUCOSE BLD-MCNC: 106 MG/DL (ref 60–115)
GLUCOSE BLD-MCNC: 126 MG/DL (ref 70–99)
GLUCOSE BLD-MCNC: 96 MG/DL (ref 60–115)
HCT VFR BLD CALC: 43.4 % (ref 37–47)
HEMOGLOBIN: 14.4 G/DL (ref 12–16)
LYMPHOCYTES ABSOLUTE: 1.6 K/UL (ref 1–4.8)
LYMPHOCYTES RELATIVE PERCENT: 25.9 %
MAGNESIUM: 2.2 MG/DL (ref 1.7–2.4)
MCH RBC QN AUTO: 29.8 PG (ref 27–31.3)
MCHC RBC AUTO-ENTMCNC: 33.3 % (ref 33–37)
MCV RBC AUTO: 89.6 FL (ref 82–100)
MONOCYTES ABSOLUTE: 0.6 K/UL (ref 0.2–0.8)
MONOCYTES RELATIVE PERCENT: 10.6 %
NEUTROPHILS ABSOLUTE: 3.6 K/UL (ref 1.4–6.5)
NEUTROPHILS RELATIVE PERCENT: 59.6 %
PDW BLD-RTO: 13 % (ref 11.5–14.5)
PERFORMED ON: NORMAL
PLATELET # BLD: 225 K/UL (ref 130–400)
POTASSIUM SERPL-SCNC: 3.6 MEQ/L (ref 3.4–4.9)
RBC # BLD: 4.84 M/UL (ref 4.2–5.4)
SODIUM BLD-SCNC: 140 MEQ/L (ref 135–144)
TOTAL PROTEIN: 8.1 G/DL (ref 6.3–8)
TROPONIN: <0.01 NG/ML (ref 0–0.01)
WBC # BLD: 6.1 K/UL (ref 4.8–10.8)

## 2019-11-10 PROCEDURE — 36415 COLL VENOUS BLD VENIPUNCTURE: CPT

## 2019-11-10 PROCEDURE — 84484 ASSAY OF TROPONIN QUANT: CPT

## 2019-11-10 PROCEDURE — 80053 COMPREHEN METABOLIC PANEL: CPT

## 2019-11-10 PROCEDURE — 2580000003 HC RX 258: Performed by: EMERGENCY MEDICINE

## 2019-11-10 PROCEDURE — G0378 HOSPITAL OBSERVATION PER HR: HCPCS

## 2019-11-10 PROCEDURE — 6370000000 HC RX 637 (ALT 250 FOR IP): Performed by: EMERGENCY MEDICINE

## 2019-11-10 PROCEDURE — 2580000003 HC RX 258: Performed by: INTERNAL MEDICINE

## 2019-11-10 PROCEDURE — 83735 ASSAY OF MAGNESIUM: CPT

## 2019-11-10 PROCEDURE — 93005 ELECTROCARDIOGRAM TRACING: CPT

## 2019-11-10 PROCEDURE — G0378 HOSPITAL OBSERVATION PER HR: HCPCS | Performed by: INTERNAL MEDICINE

## 2019-11-10 PROCEDURE — 96375 TX/PRO/DX INJ NEW DRUG ADDON: CPT

## 2019-11-10 PROCEDURE — 6370000000 HC RX 637 (ALT 250 FOR IP): Performed by: INTERNAL MEDICINE

## 2019-11-10 PROCEDURE — 94664 DEMO&/EVAL PT USE INHALER: CPT

## 2019-11-10 PROCEDURE — 99285 EMERGENCY DEPT VISIT HI MDM: CPT

## 2019-11-10 PROCEDURE — G0379 DIRECT REFER HOSPITAL OBSERV: HCPCS

## 2019-11-10 PROCEDURE — 96374 THER/PROPH/DIAG INJ IV PUSH: CPT

## 2019-11-10 PROCEDURE — 85025 COMPLETE CBC W/AUTO DIFF WBC: CPT

## 2019-11-10 PROCEDURE — 6360000002 HC RX W HCPCS: Performed by: EMERGENCY MEDICINE

## 2019-11-10 PROCEDURE — 71045 X-RAY EXAM CHEST 1 VIEW: CPT

## 2019-11-10 RX ORDER — DEXTROSE MONOHYDRATE 25 G/50ML
12.5 INJECTION, SOLUTION INTRAVENOUS PRN
Status: DISCONTINUED | OUTPATIENT
Start: 2019-11-10 | End: 2019-11-11 | Stop reason: HOSPADM

## 2019-11-10 RX ORDER — LOSARTAN POTASSIUM 50 MG/1
50 TABLET ORAL DAILY
Status: DISCONTINUED | OUTPATIENT
Start: 2019-11-10 | End: 2019-11-11 | Stop reason: HOSPADM

## 2019-11-10 RX ORDER — POTASSIUM CHLORIDE 7.45 MG/ML
10 INJECTION INTRAVENOUS PRN
Status: DISCONTINUED | OUTPATIENT
Start: 2019-11-10 | End: 2019-11-11 | Stop reason: HOSPADM

## 2019-11-10 RX ORDER — MORPHINE SULFATE 4 MG/ML
4 INJECTION, SOLUTION INTRAMUSCULAR; INTRAVENOUS ONCE
Status: COMPLETED | OUTPATIENT
Start: 2019-11-10 | End: 2019-11-10

## 2019-11-10 RX ORDER — CLOPIDOGREL BISULFATE 75 MG/1
75 TABLET ORAL DAILY
Status: DISCONTINUED | OUTPATIENT
Start: 2019-11-10 | End: 2019-11-11 | Stop reason: HOSPADM

## 2019-11-10 RX ORDER — ASPIRIN 81 MG/1
324 TABLET, CHEWABLE ORAL ONCE
Status: COMPLETED | OUTPATIENT
Start: 2019-11-10 | End: 2019-11-10

## 2019-11-10 RX ORDER — NITROGLYCERIN 0.4 MG/1
0.4 TABLET SUBLINGUAL EVERY 5 MIN PRN
Status: DISCONTINUED | OUTPATIENT
Start: 2019-11-10 | End: 2019-11-10 | Stop reason: HOSPADM

## 2019-11-10 RX ORDER — CLOPIDOGREL BISULFATE 75 MG/1
75 TABLET ORAL DAILY
COMMUNITY
End: 2019-11-11 | Stop reason: SDUPTHER

## 2019-11-10 RX ORDER — POLYETHYLENE GLYCOL 3350 17 G/17G
17 POWDER, FOR SOLUTION ORAL DAILY PRN
Status: DISCONTINUED | OUTPATIENT
Start: 2019-11-10 | End: 2019-11-11 | Stop reason: HOSPADM

## 2019-11-10 RX ORDER — ACETAMINOPHEN 325 MG/1
650 TABLET ORAL EVERY 4 HOURS PRN
Status: DISCONTINUED | OUTPATIENT
Start: 2019-11-10 | End: 2019-11-11 | Stop reason: HOSPADM

## 2019-11-10 RX ORDER — ONDANSETRON 2 MG/ML
4 INJECTION INTRAMUSCULAR; INTRAVENOUS EVERY 6 HOURS PRN
Status: DISCONTINUED | OUTPATIENT
Start: 2019-11-10 | End: 2019-11-11 | Stop reason: HOSPADM

## 2019-11-10 RX ORDER — IPRATROPIUM BROMIDE AND ALBUTEROL SULFATE 2.5; .5 MG/3ML; MG/3ML
1 SOLUTION RESPIRATORY (INHALATION)
Status: DISCONTINUED | OUTPATIENT
Start: 2019-11-10 | End: 2019-11-10

## 2019-11-10 RX ORDER — ATORVASTATIN CALCIUM 40 MG/1
40 TABLET, FILM COATED ORAL NIGHTLY
Status: DISCONTINUED | OUTPATIENT
Start: 2019-11-10 | End: 2019-11-11 | Stop reason: HOSPADM

## 2019-11-10 RX ORDER — ONDANSETRON 2 MG/ML
4 INJECTION INTRAMUSCULAR; INTRAVENOUS ONCE
Status: COMPLETED | OUTPATIENT
Start: 2019-11-10 | End: 2019-11-10

## 2019-11-10 RX ORDER — SODIUM CHLORIDE 0.9 % (FLUSH) 0.9 %
10 SYRINGE (ML) INJECTION PRN
Status: DISCONTINUED | OUTPATIENT
Start: 2019-11-10 | End: 2019-11-11 | Stop reason: HOSPADM

## 2019-11-10 RX ORDER — NICOTINE POLACRILEX 4 MG
15 LOZENGE BUCCAL PRN
Status: DISCONTINUED | OUTPATIENT
Start: 2019-11-10 | End: 2019-11-11 | Stop reason: HOSPADM

## 2019-11-10 RX ORDER — 0.9 % SODIUM CHLORIDE 0.9 %
1000 INTRAVENOUS SOLUTION INTRAVENOUS ONCE
Status: COMPLETED | OUTPATIENT
Start: 2019-11-10 | End: 2019-11-10

## 2019-11-10 RX ORDER — AMLODIPINE BESYLATE 5 MG/1
5 TABLET ORAL DAILY
Status: DISCONTINUED | OUTPATIENT
Start: 2019-11-10 | End: 2019-11-11 | Stop reason: HOSPADM

## 2019-11-10 RX ORDER — NITROGLYCERIN 0.4 MG/1
0.4 TABLET SUBLINGUAL EVERY 5 MIN PRN
Status: DISCONTINUED | OUTPATIENT
Start: 2019-11-10 | End: 2019-11-11 | Stop reason: HOSPADM

## 2019-11-10 RX ORDER — DEXTROSE MONOHYDRATE 50 MG/ML
100 INJECTION, SOLUTION INTRAVENOUS PRN
Status: DISCONTINUED | OUTPATIENT
Start: 2019-11-10 | End: 2019-11-11 | Stop reason: HOSPADM

## 2019-11-10 RX ORDER — MAGNESIUM SULFATE 1 G/100ML
1 INJECTION INTRAVENOUS PRN
Status: DISCONTINUED | OUTPATIENT
Start: 2019-11-10 | End: 2019-11-11 | Stop reason: HOSPADM

## 2019-11-10 RX ORDER — ASPIRIN 81 MG/1
81 TABLET, CHEWABLE ORAL DAILY
Status: DISCONTINUED | OUTPATIENT
Start: 2019-11-11 | End: 2019-11-11 | Stop reason: HOSPADM

## 2019-11-10 RX ORDER — SODIUM CHLORIDE 0.9 % (FLUSH) 0.9 %
10 SYRINGE (ML) INJECTION EVERY 12 HOURS SCHEDULED
Status: DISCONTINUED | OUTPATIENT
Start: 2019-11-10 | End: 2019-11-11 | Stop reason: HOSPADM

## 2019-11-10 RX ORDER — HYDROCHLOROTHIAZIDE 25 MG/1
12.5 TABLET ORAL DAILY
Status: DISCONTINUED | OUTPATIENT
Start: 2019-11-10 | End: 2019-11-11 | Stop reason: HOSPADM

## 2019-11-10 RX ORDER — IPRATROPIUM BROMIDE AND ALBUTEROL SULFATE 2.5; .5 MG/3ML; MG/3ML
1 SOLUTION RESPIRATORY (INHALATION) EVERY 4 HOURS PRN
Status: DISCONTINUED | OUTPATIENT
Start: 2019-11-10 | End: 2019-11-11 | Stop reason: HOSPADM

## 2019-11-10 RX ORDER — PANTOPRAZOLE SODIUM 40 MG/1
40 TABLET, DELAYED RELEASE ORAL
Status: DISCONTINUED | OUTPATIENT
Start: 2019-11-11 | End: 2019-11-11 | Stop reason: HOSPADM

## 2019-11-10 RX ORDER — DOCUSATE SODIUM 100 MG/1
100 CAPSULE, LIQUID FILLED ORAL 2 TIMES DAILY
Status: DISCONTINUED | OUTPATIENT
Start: 2019-11-10 | End: 2019-11-11 | Stop reason: HOSPADM

## 2019-11-10 RX ADMIN — CLOPIDOGREL BISULFATE 75 MG: 75 TABLET ORAL at 11:53

## 2019-11-10 RX ADMIN — NITROGLYCERIN 0.4 MG: 0.4 TABLET, ORALLY DISINTEGRATING SUBLINGUAL at 15:28

## 2019-11-10 RX ADMIN — ONDANSETRON 4 MG: 2 INJECTION INTRAMUSCULAR; INTRAVENOUS at 05:20

## 2019-11-10 RX ADMIN — ACETAMINOPHEN 650 MG: 325 TABLET ORAL at 15:29

## 2019-11-10 RX ADMIN — LOSARTAN POTASSIUM 50 MG: 50 TABLET, FILM COATED ORAL at 20:38

## 2019-11-10 RX ADMIN — ASPIRIN 81 MG 324 MG: 81 TABLET ORAL at 05:20

## 2019-11-10 RX ADMIN — MORPHINE SULFATE 4 MG: 4 INJECTION, SOLUTION INTRAMUSCULAR; INTRAVENOUS at 05:20

## 2019-11-10 RX ADMIN — ATORVASTATIN CALCIUM 40 MG: 40 TABLET, FILM COATED ORAL at 20:38

## 2019-11-10 RX ADMIN — Medication 10 ML: at 20:43

## 2019-11-10 RX ADMIN — SODIUM CHLORIDE 1000 ML: 9 INJECTION, SOLUTION INTRAVENOUS at 05:20

## 2019-11-10 RX ADMIN — SERTRALINE HYDROCHLORIDE 50 MG: 50 TABLET ORAL at 20:39

## 2019-11-10 RX ADMIN — HYDROCHLOROTHIAZIDE 12.5 MG: 25 TABLET ORAL at 20:38

## 2019-11-10 RX ADMIN — ACETAMINOPHEN 650 MG: 325 TABLET ORAL at 20:39

## 2019-11-10 RX ADMIN — NITROGLYCERIN 0.4 MG: 0.4 TABLET SUBLINGUAL at 05:20

## 2019-11-10 RX ADMIN — AMLODIPINE BESYLATE 5 MG: 5 TABLET ORAL at 20:39

## 2019-11-10 ASSESSMENT — ENCOUNTER SYMPTOMS
APNEA: 0
WHEEZING: 0
ABDOMINAL DISTENTION: 0
NAUSEA: 0
DIARRHEA: 0
SINUS PRESSURE: 0
EYE PAIN: 0
ABDOMINAL PAIN: 0
SORE THROAT: 0
BACK PAIN: 0
SHORTNESS OF BREATH: 0
VOMITING: 0
RHINORRHEA: 0
COUGH: 0
CONSTIPATION: 0
PHOTOPHOBIA: 0
COLOR CHANGE: 0

## 2019-11-10 ASSESSMENT — PAIN DESCRIPTION - LOCATION
LOCATION: ARM

## 2019-11-10 ASSESSMENT — PAIN SCALES - GENERAL
PAINLEVEL_OUTOF10: 3
PAINLEVEL_OUTOF10: 7
PAINLEVEL_OUTOF10: 4
PAINLEVEL_OUTOF10: 4
PAINLEVEL_OUTOF10: 1
PAINLEVEL_OUTOF10: 0
PAINLEVEL_OUTOF10: 7
PAINLEVEL_OUTOF10: 1

## 2019-11-10 ASSESSMENT — PAIN DESCRIPTION - DESCRIPTORS
DESCRIPTORS: ACHING
DESCRIPTORS: ACHING

## 2019-11-10 ASSESSMENT — PAIN DESCRIPTION - PAIN TYPE
TYPE: ACUTE PAIN

## 2019-11-10 ASSESSMENT — PAIN DESCRIPTION - ORIENTATION
ORIENTATION: LEFT

## 2019-11-10 ASSESSMENT — PAIN DESCRIPTION - PROGRESSION
CLINICAL_PROGRESSION: NOT CHANGED
CLINICAL_PROGRESSION: GRADUALLY IMPROVING

## 2019-11-10 ASSESSMENT — PAIN DESCRIPTION - FREQUENCY
FREQUENCY: CONTINUOUS
FREQUENCY: CONTINUOUS

## 2019-11-10 ASSESSMENT — PAIN DESCRIPTION - ONSET: ONSET: ON-GOING

## 2019-11-11 VITALS
DIASTOLIC BLOOD PRESSURE: 52 MMHG | BODY MASS INDEX: 35.68 KG/M2 | OXYGEN SATURATION: 94 % | HEIGHT: 62 IN | WEIGHT: 193.9 LBS | HEART RATE: 75 BPM | TEMPERATURE: 98.4 F | RESPIRATION RATE: 16 BRPM | SYSTOLIC BLOOD PRESSURE: 106 MMHG

## 2019-11-11 LAB
ANION GAP SERPL CALCULATED.3IONS-SCNC: 9 MEQ/L (ref 9–15)
BUN BLDV-MCNC: 16 MG/DL (ref 8–23)
CALCIUM SERPL-MCNC: 8.9 MG/DL (ref 8.5–9.9)
CHLORIDE BLD-SCNC: 104 MEQ/L (ref 95–107)
CO2: 30 MEQ/L (ref 20–31)
CREAT SERPL-MCNC: 0.82 MG/DL (ref 0.5–0.9)
EKG ATRIAL RATE: 70 BPM
EKG P AXIS: 49 DEGREES
EKG P-R INTERVAL: 202 MS
EKG Q-T INTERVAL: 466 MS
EKG QRS DURATION: 166 MS
EKG QTC CALCULATION (BAZETT): 503 MS
EKG R AXIS: 14 DEGREES
EKG T AXIS: 70 DEGREES
EKG VENTRICULAR RATE: 70 BPM
GFR AFRICAN AMERICAN: >60
GFR NON-AFRICAN AMERICAN: >60
GLUCOSE BLD-MCNC: 105 MG/DL (ref 60–115)
GLUCOSE BLD-MCNC: 113 MG/DL (ref 60–115)
GLUCOSE BLD-MCNC: 123 MG/DL (ref 70–99)
HCT VFR BLD CALC: 41.4 % (ref 37–47)
HEMOGLOBIN: 13.8 G/DL (ref 12–16)
LV EF: 55 %
LVEF MODALITY: NORMAL
MCH RBC QN AUTO: 30.1 PG (ref 27–31.3)
MCHC RBC AUTO-ENTMCNC: 33.4 % (ref 33–37)
MCV RBC AUTO: 90.1 FL (ref 82–100)
PDW BLD-RTO: 12.8 % (ref 11.5–14.5)
PERFORMED ON: NORMAL
PERFORMED ON: NORMAL
PLATELET # BLD: 212 K/UL (ref 130–400)
POTASSIUM REFLEX MAGNESIUM: 4 MEQ/L (ref 3.4–4.9)
RBC # BLD: 4.59 M/UL (ref 4.2–5.4)
SODIUM BLD-SCNC: 143 MEQ/L (ref 135–144)
WBC # BLD: 4.1 K/UL (ref 4.8–10.8)

## 2019-11-11 PROCEDURE — 6370000000 HC RX 637 (ALT 250 FOR IP): Performed by: INTERNAL MEDICINE

## 2019-11-11 PROCEDURE — 2580000003 HC RX 258: Performed by: INTERNAL MEDICINE

## 2019-11-11 PROCEDURE — 93306 TTE W/DOPPLER COMPLETE: CPT

## 2019-11-11 PROCEDURE — G0008 ADMIN INFLUENZA VIRUS VAC: HCPCS | Performed by: INTERNAL MEDICINE

## 2019-11-11 PROCEDURE — 80048 BASIC METABOLIC PNL TOTAL CA: CPT

## 2019-11-11 PROCEDURE — 93010 ELECTROCARDIOGRAM REPORT: CPT | Performed by: INTERNAL MEDICINE

## 2019-11-11 PROCEDURE — 93005 ELECTROCARDIOGRAM TRACING: CPT | Performed by: INTERNAL MEDICINE

## 2019-11-11 PROCEDURE — 85027 COMPLETE CBC AUTOMATED: CPT

## 2019-11-11 PROCEDURE — 36415 COLL VENOUS BLD VENIPUNCTURE: CPT

## 2019-11-11 PROCEDURE — G0378 HOSPITAL OBSERVATION PER HR: HCPCS

## 2019-11-11 PROCEDURE — 90686 IIV4 VACC NO PRSV 0.5 ML IM: CPT | Performed by: INTERNAL MEDICINE

## 2019-11-11 PROCEDURE — 6360000002 HC RX W HCPCS: Performed by: INTERNAL MEDICINE

## 2019-11-11 RX ORDER — NITROGLYCERIN 0.4 MG/1
TABLET SUBLINGUAL
Qty: 25 TABLET | Refills: 3 | Status: SHIPPED | OUTPATIENT
Start: 2019-11-11

## 2019-11-11 RX ORDER — CLOPIDOGREL BISULFATE 75 MG/1
75 TABLET ORAL DAILY
Qty: 30 TABLET | Refills: 3 | Status: SHIPPED | OUTPATIENT
Start: 2019-11-11

## 2019-11-11 RX ADMIN — PANTOPRAZOLE SODIUM 40 MG: 40 TABLET, DELAYED RELEASE ORAL at 06:05

## 2019-11-11 RX ADMIN — Medication 10 ML: at 08:05

## 2019-11-11 RX ADMIN — ACETAMINOPHEN 650 MG: 325 TABLET ORAL at 11:35

## 2019-11-11 RX ADMIN — ASPIRIN 81 MG 81 MG: 81 TABLET ORAL at 08:03

## 2019-11-11 RX ADMIN — INFLUENZA A VIRUS A/BRISBANE/02/2018 IVR-190 (H1N1) ANTIGEN (PROPIOLACTONE INACTIVATED), INFLUENZA A VIRUS A/KANSAS/14/2017 X-327 (H3N2) ANTIGEN (PROPIOLACTONE INACTIVATED), INFLUENZA B VIRUS B/MARYLAND/15/2016 ANTIGEN (PROPIOLACTONE INACTIVATED), INFLUENZA B VIRUS B/PHUKET/3073/2013 BVR-1B ANTIGEN (PROPIOLACTONE INACTIVATED) 0.5 ML: 15; 15; 15; 15 INJECTION, SUSPENSION INTRAMUSCULAR at 08:05

## 2019-11-11 ASSESSMENT — PAIN SCALES - GENERAL
PAINLEVEL_OUTOF10: 0
PAINLEVEL_OUTOF10: 1
PAINLEVEL_OUTOF10: 4
PAINLEVEL_OUTOF10: 0
PAINLEVEL_OUTOF10: 1
PAINLEVEL_OUTOF10: 0

## 2019-11-12 PROCEDURE — 93010 ELECTROCARDIOGRAM REPORT: CPT | Performed by: INTERNAL MEDICINE

## 2019-11-12 ASSESSMENT — ENCOUNTER SYMPTOMS
ABDOMINAL DISTENTION: 0
RHINORRHEA: 0
CONSTIPATION: 0
WHEEZING: 0
COUGH: 0
VOMITING: 0
DIARRHEA: 0
NAUSEA: 0
SINUS PRESSURE: 0
PHOTOPHOBIA: 0
APNEA: 0
SORE THROAT: 0
SHORTNESS OF BREATH: 0
COLOR CHANGE: 0
EYE PAIN: 0
ABDOMINAL PAIN: 0
BACK PAIN: 0

## 2021-02-16 ENCOUNTER — OFFICE VISIT (OUTPATIENT)
Dept: FAMILY MEDICINE CLINIC | Age: 61
End: 2021-02-16
Payer: COMMERCIAL

## 2021-02-16 VITALS
HEIGHT: 62 IN | HEART RATE: 83 BPM | TEMPERATURE: 96 F | BODY MASS INDEX: 35.63 KG/M2 | WEIGHT: 193.6 LBS | OXYGEN SATURATION: 95 %

## 2021-02-16 DIAGNOSIS — B02.9 HERPES ZOSTER WITHOUT COMPLICATION: Primary | ICD-10-CM

## 2021-02-16 PROCEDURE — 3017F COLORECTAL CA SCREEN DOC REV: CPT | Performed by: NURSE PRACTITIONER

## 2021-02-16 PROCEDURE — G8484 FLU IMMUNIZE NO ADMIN: HCPCS | Performed by: NURSE PRACTITIONER

## 2021-02-16 PROCEDURE — G8427 DOCREV CUR MEDS BY ELIG CLIN: HCPCS | Performed by: NURSE PRACTITIONER

## 2021-02-16 PROCEDURE — 1036F TOBACCO NON-USER: CPT | Performed by: NURSE PRACTITIONER

## 2021-02-16 PROCEDURE — G8417 CALC BMI ABV UP PARAM F/U: HCPCS | Performed by: NURSE PRACTITIONER

## 2021-02-16 PROCEDURE — 99213 OFFICE O/P EST LOW 20 MIN: CPT | Performed by: NURSE PRACTITIONER

## 2021-02-16 RX ORDER — VALACYCLOVIR HYDROCHLORIDE 1 G/1
1000 TABLET, FILM COATED ORAL 3 TIMES DAILY
Qty: 21 TABLET | Refills: 0 | Status: SHIPPED | OUTPATIENT
Start: 2021-02-16 | End: 2021-02-23

## 2021-02-16 RX ORDER — HYDROCODONE BITARTRATE AND ACETAMINOPHEN 5; 325 MG/1; MG/1
1 TABLET ORAL EVERY 6 HOURS PRN
Qty: 12 TABLET | Refills: 0 | Status: SHIPPED | OUTPATIENT
Start: 2021-02-16 | End: 2021-02-19

## 2021-02-16 SDOH — ECONOMIC STABILITY: TRANSPORTATION INSECURITY
IN THE PAST 12 MONTHS, HAS THE LACK OF TRANSPORTATION KEPT YOU FROM MEDICAL APPOINTMENTS OR FROM GETTING MEDICATIONS?: NO

## 2021-02-16 SDOH — ECONOMIC STABILITY: FOOD INSECURITY: WITHIN THE PAST 12 MONTHS, THE FOOD YOU BOUGHT JUST DIDN'T LAST AND YOU DIDN'T HAVE MONEY TO GET MORE.: NEVER TRUE

## 2021-02-16 SDOH — ECONOMIC STABILITY: INCOME INSECURITY: HOW HARD IS IT FOR YOU TO PAY FOR THE VERY BASICS LIKE FOOD, HOUSING, MEDICAL CARE, AND HEATING?: NOT HARD AT ALL

## 2021-02-16 ASSESSMENT — ENCOUNTER SYMPTOMS
BACK PAIN: 0
SHORTNESS OF BREATH: 0
NAUSEA: 0
ALLERGIC/IMMUNOLOGIC NEGATIVE: 1
RHINORRHEA: 0
SORE THROAT: 0
PHOTOPHOBIA: 0
WHEEZING: 0
EYES NEGATIVE: 1
VOMITING: 0
ABDOMINAL PAIN: 0

## 2021-02-16 ASSESSMENT — PATIENT HEALTH QUESTIONNAIRE - PHQ9
2. FEELING DOWN, DEPRESSED OR HOPELESS: 0
SUM OF ALL RESPONSES TO PHQ QUESTIONS 1-9: 1
1. LITTLE INTEREST OR PLEASURE IN DOING THINGS: 1
SUM OF ALL RESPONSES TO PHQ QUESTIONS 1-9: 1

## 2021-02-16 NOTE — PATIENT INSTRUCTIONS
Patient Education        Shingles: Care Instructions  Your Care Instructions     Shingles (herpes zoster) causes pain and a blistered rash. The rash can appear anywhere on the body but will be on only one side of the body, the left or right. It will be in a band, a strip, or a small area. The pain can be very severe. Shingles can also cause tingling or itching in the area of the rash. The blisters scab over after a few days and heal in 2 to 4 weeks. Medicines can help you feel better and may help prevent more serious problems caused by shingles. Shingles is caused by the same virus that causes chickenpox. When you have chickenpox, the virus gets into your nerve roots and stays there (becomes dormant) long after you get over the chickenpox. If the virus becomes active again, it can cause shingles. Follow-up care is a key part of your treatment and safety. Be sure to make and go to all appointments, and call your doctor if you are having problems. It's also a good idea to know your test results and keep a list of the medicines you take. How can you care for yourself at home? · Be safe with medicines. Take your medicines exactly as prescribed. Call your doctor if you think you are having a problem with your medicine. Antiviral medicine helps you get better faster. · Try not to scratch or pick at the blisters. They will crust over and fall off on their own if you leave them alone. · Put cool, wet cloths on the area to relieve pain and itching. You can also use calamine lotion. Try not to use so much lotion that it cakes and is hard to get off. · Put cornstarch or baking soda on the sores to help dry them out so they heal faster. · Do not use thick ointment, such as petroleum jelly, on the sores. This will keep them from drying and healing. · To help remove loose crusts, soak them in tap water. This can help decrease oozing, and dry and soothe the skin.

## 2021-02-16 NOTE — PROGRESS NOTES
Subjective  Tanisha Thomas, 59 y.o. female presents today with:  Chief Complaint   Patient presents with    Rash     Rash on left side of body mostly on bottom area. Pt states she is sore around vaginal area going around left side of body. x5 days. Patient reports severe pain from the point of her left lower back around to her left groin. This started yesterday and this morning she noticed a rash developed with small blisters. She rates the pain as a 8 of 10 and describes it as a shooting pain. Patient denies fever, cough, congestion, chest pain, abdominal pain, nausea, or vomiting. Review of Systems   Constitutional: Negative for appetite change, fatigue, fever and unexpected weight change. HENT: Negative for congestion, rhinorrhea and sore throat. Eyes: Negative. Negative for photophobia and visual disturbance. Respiratory: Negative for shortness of breath and wheezing. Cardiovascular: Negative for chest pain. Gastrointestinal: Negative for abdominal pain, nausea and vomiting. Endocrine: Negative. Negative for polydipsia, polyphagia and polyuria. Genitourinary: Negative for difficulty urinating, dysuria and pelvic pain. Musculoskeletal: Negative for back pain. Skin: Positive for rash. Allergic/Immunologic: Negative. Neurological: Negative. Negative for dizziness, speech difficulty and weakness. Hematological: Negative. Psychiatric/Behavioral: Negative. Negative for behavioral problems and confusion.        Past Medical History:   Diagnosis Date    Asthma     [facility] [address]     Diabetes mellitus ([facility] [address]     H/O heart artery stent     Hyperlipidemia     Hypertension      Past Surgical History:   Procedure Laterality Date    CARPAL TUNNEL RELEASE Bilateral     DILATION AND CURETTAGE OF UTERUS      TUBAL LIGATION       Social History     Socioeconomic History    Marital status:      Spouse name: Not on file  Number of children: Not on file    Years of education: Not on file    Highest education level: Not on file   Occupational History    Not on file   Social Needs    Financial resource strain: Not hard at all    Food insecurity     Worry: Never true     Inability: Never true   [employer] needs     Medical: No     Non-medical: No   Tobacco Use    Smoking status: Never Smoker    Smokeless tobacco: Never Used   Substance and Sexual Activity    Alcohol use: No    Drug use: No    Sexual activity: Yes     Partners: Male   Lifestyle    Physical activity     Days per week: Not on file     Minutes per session: Not on file    Stress: Not on file   Relationships    Social connections     Talks on phone: Not on file     Gets together: Not on file     Attends Evangelical service: Not on file     Active member of club or organization: Not on file     Attends meetings of clubs or organizations: Not on file     Relationship status: Not on file    Intimate partner violence     Fear of current or ex partner: Not on file     Emotionally abused: Not on file     Physically abused: Not on file     Forced sexual activity: Not on file   Other Topics Concern    Not on file   Social History Narrative    Not on file     No family history on file. Allergies   Allergen Reactions    Erythromycin Base Hives and Swelling    Pcn [Penicillins] Hives    Propoxyphene Hives and Swelling    Sulfamethoxazole Hives and Swelling    Lisinopril Nausea And Vomiting     Current Outpatient Medications   Medication Sig Dispense Refill    ZINC PO Take by mouth      Ascorbic Acid (VITAMIN C PO) Take by mouth      valACYclovir (VALTREX) 1 g tablet Take 1 tablet by mouth 3 times daily for 7 days 21 tablet 0    HYDROcodone-acetaminophen (NORCO) 5-325 MG per tablet Take 1 tablet by mouth every 6 hours as needed for Pain for up to 3 days. Intended supply: 3 days.  Take lowest dose possible to manage pain 12 tablet 0  nitroGLYCERIN (NITROSTAT) 0.4 MG SL tablet up to max of 3 total doses. If no relief after 1 dose, call 911. 25 tablet 3    clopidogrel (PLAVIX) 75 MG tablet Take 1 tablet by mouth daily 30 tablet 3    aspirin 81 MG tablet Take 81 mg by mouth daily      sertraline (ZOLOFT) 50 MG tablet Take 50 mg by mouth daily      amLODIPine (NORVASC) 5 MG tablet Take 5 mg by mouth daily      metFORMIN (GLUCOPHAGE) 500 MG tablet Take 500 mg by mouth 2 times daily (with meals)      losartan (COZAAR) 50 MG tablet Take 50 mg by mouth daily      vitamin D (CHOLECALCIFEROL) 1000 UNIT TABS tablet Take 1,000 Units by mouth daily      vitamin B-12 (CYANOCOBALAMIN) 1000 MCG tablet Take 1,000 mcg by mouth daily      atorvastatin (LIPITOR) 40 MG tablet Take 40 mg by mouth daily       No current facility-administered medications for this visit. PMH, Surgical Hx, Family Hx, and Social Hxreviewed and updated. Health Maintenance reviewed. Objective    Vitals:    02/16/21 1454   Pulse: 83   Temp: 96 °F (35.6 °C)   TempSrc: Temporal   SpO2: 95%   Weight: 193 lb 9.6 oz (87.8 kg)   Height: 5' 2\" (1.575 m)       Physical Exam  Vitals signs and nursing note reviewed. Constitutional:       General: She is not in acute distress. Appearance: She is well-developed. HENT:      Head: Normocephalic. Nose: Nose normal.   Eyes:      Pupils: Pupils are equal, round, and reactive to light. Neck:      Musculoskeletal: Normal range of motion. Cardiovascular:      Rate and Rhythm: Normal rate and regular rhythm. Pulmonary:      Effort: Pulmonary effort is normal. No respiratory distress. Breath sounds: Normal breath sounds. No wheezing or rales. Abdominal:      General: Bowel sounds are normal. There is no distension. Palpations: Abdomen is soft. There is no mass. Tenderness: There is no abdominal tenderness. There is no guarding or rebound. Hernia: No hernia is present. Musculoskeletal: Normal range of motion. Skin:     General: Skin is warm and dry. Capillary Refill: Capillary refill takes less than 2 seconds. Neurological:      Mental Status: She is alert and oriented to person, place, and time. Assessment & Plan   Discussed with patient the need to take the prescribed valacyclovir as prescribed and as soon as possible today. For pain control use the Norco as needed. May split the pill in half. Also add ibuprofen every 6-8 hours. Discussed the need to stay away from those that are immunocompromised, pregnant women, very young babies, and those that have not had the chickenpox vaccine or chickenpox. Diagnosis Orders   1. Herpes zoster without complication  HYDROcodone-acetaminophen (NORCO) 5-325 MG per tablet     No orders of the defined types were placed in this encounter. Orders Placed This Encounter   Medications    valACYclovir (VALTREX) 1 g tablet     Sig: Take 1 tablet by mouth 3 times daily for 7 days     Dispense:  21 tablet     Refill:  0    HYDROcodone-acetaminophen (NORCO) 5-325 MG per tablet     Sig: Take 1 tablet by mouth every 6 hours as needed for Pain for up to 3 days. Intended supply: 3 days. Take lowest dose possible to manage pain     Dispense:  12 tablet     Refill:  0     Reduce doses taken as pain becomes manageable     Medications Discontinued During This Encounter   Medication Reason    hydrochlorothiazide (HYDRODIURIL) 25 MG tablet LIST CLEANUP     Return if symptoms worsen or fail to improve. Reviewed with the patient: current clinical status, medications, activities and diet. Side effects, adverse effects of the medication prescribed today, as well as treatment plan/ rationale and resultexpectations have been discussed with the patient who expresses understanding and desires to proceed. Close follow up to evaluate treatment resultsand for coordination of care. I have reviewed the patient's medical history in detail and updated the computerized patient record.     Cliff Bennett RN, NP

## 2021-08-10 ENCOUNTER — OFFICE VISIT (OUTPATIENT)
Dept: FAMILY MEDICINE CLINIC | Age: 61
End: 2021-08-10
Payer: COMMERCIAL

## 2021-08-10 VITALS
HEART RATE: 81 BPM | HEIGHT: 62 IN | BODY MASS INDEX: 35.33 KG/M2 | WEIGHT: 192 LBS | TEMPERATURE: 96.8 F | DIASTOLIC BLOOD PRESSURE: 90 MMHG | OXYGEN SATURATION: 97 % | SYSTOLIC BLOOD PRESSURE: 140 MMHG

## 2021-08-10 DIAGNOSIS — Z00.00 ENCOUNTER FOR MEDICAL EXAMINATION TO ESTABLISH CARE: ICD-10-CM

## 2021-08-10 DIAGNOSIS — E08.42 DIABETIC POLYNEUROPATHY ASSOCIATED WITH DIABETES MELLITUS DUE TO UNDERLYING CONDITION (HCC): ICD-10-CM

## 2021-08-10 DIAGNOSIS — R21 RASH AND NONSPECIFIC SKIN ERUPTION: Primary | ICD-10-CM

## 2021-08-10 PROCEDURE — 3017F COLORECTAL CA SCREEN DOC REV: CPT | Performed by: FAMILY MEDICINE

## 2021-08-10 PROCEDURE — 2022F DILAT RTA XM EVC RTNOPTHY: CPT | Performed by: FAMILY MEDICINE

## 2021-08-10 PROCEDURE — G8417 CALC BMI ABV UP PARAM F/U: HCPCS | Performed by: FAMILY MEDICINE

## 2021-08-10 PROCEDURE — G8427 DOCREV CUR MEDS BY ELIG CLIN: HCPCS | Performed by: FAMILY MEDICINE

## 2021-08-10 PROCEDURE — 1036F TOBACCO NON-USER: CPT | Performed by: FAMILY MEDICINE

## 2021-08-10 PROCEDURE — 99203 OFFICE O/P NEW LOW 30 MIN: CPT | Performed by: FAMILY MEDICINE

## 2021-08-10 RX ORDER — DIPHENHYDRAMINE HYDROCHLORIDE 25 MG/1
CAPSULE, LIQUID FILLED ORAL
COMMUNITY

## 2021-08-10 RX ORDER — CLOBETASOL PROPIONATE 0.5 MG/G
OINTMENT TOPICAL
Qty: 60 G | Refills: 0 | Status: SHIPPED | OUTPATIENT
Start: 2021-08-10

## 2021-08-10 RX ORDER — FLUCONAZOLE 150 MG/1
TABLET ORAL
COMMUNITY
Start: 2021-08-03

## 2021-08-10 RX ORDER — METHYLPREDNISOLONE 4 MG/1
TABLET ORAL
COMMUNITY
Start: 2021-07-28

## 2021-08-10 RX ORDER — FLUTICASONE PROPIONATE 50 MCG
1 SPRAY, SUSPENSION (ML) NASAL DAILY
COMMUNITY

## 2021-08-10 ASSESSMENT — ENCOUNTER SYMPTOMS
SORE THROAT: 0
DIARRHEA: 0
WHEEZING: 0
RHINORRHEA: 0
COUGH: 0
SHORTNESS OF BREATH: 0
ABDOMINAL PAIN: 0
CONSTIPATION: 0

## 2021-08-10 NOTE — PROGRESS NOTES
[address] [facility] [address] [facility] PRIMARY CARE  [address] [place] [address]  Dept: [phone]  Dept Fax: : [phone]     Chief Complaint  Chief Complaint   Patient presents with    New Patient    Rash     upper extremeties and face, itchy, painful, x2 weeks       HPI:  59 y. o.female who presents for the following:  (establish; was seeing Dr. Rebeca Heath)    Rash: noticed it 7/14 on the R hand; then has progressed to b/l hands and larger along with involvement of the neck, back, and face; has been itchy and scratching; seen by Dr. Rebeca Heath and given fluconazole, topical econazole, and medrol pack; there was some temporary improvement;     CVS hx: hx of CVAx2, cardiac stenting x2; sees Dr. Medina [facility])    Hx of DM2: had been on metformin and trial off it recently over with plan to visit Dr. Rebeca Heath after 3mo    Review of Systems   Constitutional: Negative for chills and fever. HENT: Negative for congestion, rhinorrhea and sore throat. Respiratory: Negative for cough, shortness of breath and wheezing. Gastrointestinal: Negative for abdominal pain, constipation and diarrhea. Endocrine: Negative for polydipsia and polyuria. Genitourinary: Negative for dysuria, frequency and urgency. Skin: Positive for rash. Neurological: Negative for syncope, light-headedness, numbness and headaches. Psychiatric/Behavioral: Negative for sleep disturbance. The patient is not nervous/anxious.         Past Medical History:   Diagnosis Date    Asthma     Cerebral [facility] occlusion with cerebral infarction ([facility] [address]     Diabetes mellitus ([facility] [address]     H/O heart artery stent     Hyperlipidemia     Hypertension      Past Surgical History:   Procedure Laterality Date    CARPAL TUNNEL RELEASE Bilateral     DILATION AND CURETTAGE OF UTERUS      TUBAL LIGATION       Social History     Socioeconomic History    Marital status:      Spouse name: Not on file    Number of children: Not on file    Years of education: Not on file    Highest education level: Not on file   Occupational History    Not on file   Tobacco Use    Smoking status: Never Smoker    Smokeless tobacco: Never Used   Substance and Sexual Activity    Alcohol use: No    Drug use: No    Sexual activity: Yes     Partners: Male   Other Topics Concern    Not on file   Social History Narrative    Not on file     Social Determinants of Health     Financial Resource Strain: Low Risk     Difficulty of Paying Living Expenses: Not hard at all   Food Insecurity: No Food Insecurity    Worried About Running Out of Food in the Last Year: Never true    Cinthia of Food in the Last Year: Never true   Transportation Needs: No Transportation Needs    Lack of Transportation (Medical): No    Lack of Transportation (Non-Medical): No   Physical Activity:     Days of Exercise per Week:     Minutes of Exercise per Session:    Stress:     Feeling of Stress :    Social Connections:     Frequency of Communication with Friends and Family:     Frequency of Social Gatherings with Friends and Family:     Attends Gnosticist Services:     Active Member of Clubs or Organizations:     Attends Club or Organization Meetings:     Marital Status:    Intimate Partner Violence:     Fear of Current or Ex-Partner:     Emotionally Abused:     Physically Abused:     Sexually Abused:      No family history on file.    Allergies   Allergen Reactions    Erythromycin Base Hives and Swelling    Pcn [Penicillins] Hives    Propoxyphene Hives and Swelling    Sulfamethoxazole Hives and Swelling    Lisinopril Nausea And Vomiting     Current Outpatient Medications   Medication Sig Dispense Refill    econazole nitrate 1 % cream apply topically to affected area SPARINGLY twice a day for 1 to 2 weeks      methylPREDNISolone (MEDROL DOSEPACK) 4 MG tablet use as directed for 6 days      fluconazole (DIFLUCAN) 150 MG tablet take 1 tablet by mouth immediately then repeat in 4 to 7 days if needed      fluticasone (FLONASE) 50 MCG/ACT nasal spray 1 spray by Each Nostril route daily      blood glucose test strips (ASCENSIA AUTODISC VI;ONE TOUCH ULTRA TEST VI) strip 1 each by In Vitro route daily ONE TOUCH ULTRASOFT TEST STRIPS      Blood Glucose Monitoring Suppl (BLOOD GLUCOSE MONITOR SYSTEM) w/Device KIT by Does not apply route      UNABLE TO FIND Blood glucose test in vitro strip      clobetasol (TEMOVATE) 0.05 % ointment Apply topically 2 times daily. 60 g 0    ZINC PO Take by mouth      nitroGLYCERIN (NITROSTAT) 0.4 MG SL tablet up to max of 3 total doses. If no relief after 1 dose, call 911. 25 tablet 3    clopidogrel (PLAVIX) 75 MG tablet Take 1 tablet by mouth daily 30 tablet 3    aspirin 81 MG tablet Take 81 mg by mouth daily      sertraline (ZOLOFT) 50 MG tablet Take 50 mg by mouth 2 times daily       amLODIPine (NORVASC) 5 MG tablet Take 5 mg by mouth daily      metFORMIN (GLUCOPHAGE) 500 MG tablet Take 500 mg by mouth 2 times daily (with meals)      losartan (COZAAR) 50 MG tablet Take 50 mg by mouth daily      vitamin D (CHOLECALCIFEROL) 1000 UNIT TABS tablet Take 1,000 Units by mouth daily      vitamin B-12 (CYANOCOBALAMIN) 1000 MCG tablet Take 1,000 mcg by mouth daily      atorvastatin (LIPITOR) 40 MG tablet Take 40 mg by mouth daily       No current facility-administered medications for this visit. Vitals:    08/10/21 1437 08/10/21 1522   BP: (!) 140/90 (!) 140/90   Pulse: 81    Temp: 96.8 °F (36 °C)    TempSrc: Infrared    SpO2: 97%    Weight: 192 lb (87.1 kg)    Height: 5' 2\" (1.575 m)        Physical exam:  Physical Exam  Vitals reviewed. Constitutional:       General: She is not in acute distress. Appearance: She is well-developed. HENT:      Head: Normocephalic and atraumatic. Mouth/Throat:      Pharynx: No oropharyngeal exudate. Neck:      Thyroid: No thyromegaly.

## 2022-03-21 LAB
AVERAGE GLUCOSE: NORMAL
CHOLESTEROL, TOTAL: 147 MG/DL
CHOLESTEROL/HDL RATIO: 2.03
HBA1C MFR BLD: 6.6 %
HDLC SERPL-MCNC: 63 MG/DL (ref 35–70)
LDL CHOLESTEROL CALCULATED: 48 MG/DL (ref 0–160)
NONHDLC SERPL-MCNC: NORMAL MG/DL
TRIGL SERPL-MCNC: 180 MG/DL
VLDLC SERPL CALC-MCNC: 36 MG/DL

## 2022-04-11 ENCOUNTER — COMMUNITY OUTREACH (OUTPATIENT)
Dept: INTERNAL MEDICINE | Age: 62
End: 2022-04-11

## 2022-04-11 NOTE — PROGRESS NOTES
Patient's HM shows they are overdue for DM visit, A1c, Mammogram, Colorectal Screening and Cervical Cancer Screening. [employer] and  files searched without success. No results to attach to order nor HM updated. No upcoming appointment. Pt see's Mariella Pickup.

## 2023-04-25 LAB
ALANINE AMINOTRANSFERASE (SGPT) (U/L) IN SER/PLAS: 32 U/L (ref 7–45)
ALBUMIN (G/DL) IN SER/PLAS: 4.5 G/DL (ref 3.4–5)
ALBUMIN (MG/L) IN URINE: 17.4 MG/L
ALBUMIN/CREATININE (UG/MG) IN URINE: 20.6 UG/MG CRT (ref 0–30)
ALKALINE PHOSPHATASE (U/L) IN SER/PLAS: 47 U/L (ref 33–136)
ANION GAP IN SER/PLAS: 13 MMOL/L (ref 10–20)
ASPARTATE AMINOTRANSFERASE (SGOT) (U/L) IN SER/PLAS: 32 U/L (ref 9–39)
BILIRUBIN TOTAL (MG/DL) IN SER/PLAS: 0.9 MG/DL (ref 0–1.2)
CALCIDIOL (25 OH VITAMIN D3) (NG/ML) IN SER/PLAS: 52 NG/ML
CALCIUM (MG/DL) IN SER/PLAS: 10.1 MG/DL (ref 8.6–10.3)
CARBON DIOXIDE, TOTAL (MMOL/L) IN SER/PLAS: 26 MMOL/L (ref 21–32)
CHLORIDE (MMOL/L) IN SER/PLAS: 104 MMOL/L (ref 98–107)
CHOLESTEROL (MG/DL) IN SER/PLAS: 150 MG/DL (ref 0–199)
CHOLESTEROL IN HDL (MG/DL) IN SER/PLAS: 46.3 MG/DL
CHOLESTEROL/HDL RATIO: 3.2
CREATININE (MG/DL) IN SER/PLAS: 0.88 MG/DL (ref 0.5–1.05)
CREATININE (MG/DL) IN URINE: 84.4 MG/DL (ref 20–320)
ERYTHROCYTE DISTRIBUTION WIDTH (RATIO) BY AUTOMATED COUNT: 13.1 % (ref 11.5–14.5)
ERYTHROCYTE MEAN CORPUSCULAR HEMOGLOBIN CONCENTRATION (G/DL) BY AUTOMATED: 31.8 G/DL (ref 32–36)
ERYTHROCYTE MEAN CORPUSCULAR VOLUME (FL) BY AUTOMATED COUNT: 93 FL (ref 80–100)
ERYTHROCYTES (10*6/UL) IN BLOOD BY AUTOMATED COUNT: 4.73 X10E12/L (ref 4–5.2)
ESTIMATED AVERAGE GLUCOSE FOR HBA1C: 134 MG/DL
GFR FEMALE: 73 ML/MIN/1.73M2
GLUCOSE (MG/DL) IN SER/PLAS: 101 MG/DL (ref 74–99)
HEMATOCRIT (%) IN BLOOD BY AUTOMATED COUNT: 44 % (ref 36–46)
HEMOGLOBIN (G/DL) IN BLOOD: 14 G/DL (ref 12–16)
HEMOGLOBIN A1C/HEMOGLOBIN TOTAL IN BLOOD: 6.3 %
LDL: 73 MG/DL (ref 0–99)
LEUKOCYTES (10*3/UL) IN BLOOD BY AUTOMATED COUNT: 4.4 X10E9/L (ref 4.4–11.3)
PLATELETS (10*3/UL) IN BLOOD AUTOMATED COUNT: 217 X10E9/L (ref 150–450)
POTASSIUM (MMOL/L) IN SER/PLAS: 4.2 MMOL/L (ref 3.5–5.3)
PROTEIN TOTAL: 8 G/DL (ref 6.4–8.2)
SODIUM (MMOL/L) IN SER/PLAS: 139 MMOL/L (ref 136–145)
THYROTROPIN (MIU/L) IN SER/PLAS BY DETECTION LIMIT <= 0.05 MIU/L: 3.27 MIU/L (ref 0.44–3.98)
TRIGLYCERIDE (MG/DL) IN SER/PLAS: 152 MG/DL (ref 0–149)
UREA NITROGEN (MG/DL) IN SER/PLAS: 18 MG/DL (ref 6–23)
VLDL: 30 MG/DL (ref 0–40)

## 2023-05-01 ENCOUNTER — HOSPITAL ENCOUNTER (OUTPATIENT)
Dept: WOMENS IMAGING | Age: 63
Discharge: HOME | End: 2023-05-03
Payer: MEDICARE

## 2023-05-01 VITALS — HEIGHT: 62 IN | BODY MASS INDEX: 35.12 KG/M2

## 2023-05-01 DIAGNOSIS — Z12.31 ENCOUNTER FOR SCREENING MAMMOGRAM FOR BREAST CANCER: ICD-10-CM

## 2023-05-01 PROCEDURE — 77067 SCR MAMMO BI INCL CAD: CPT

## 2023-05-02 ENCOUNTER — HOSPITAL ENCOUNTER (OUTPATIENT)
Dept: WOMENS IMAGING | Age: 63
Discharge: HOME | End: 2023-05-03
Payer: MEDICARE

## 2023-05-02 DIAGNOSIS — Z78.0 ASYMPTOMATIC AGE-RELATED POSTMENOPAUSAL STATE: ICD-10-CM

## 2023-05-02 PROCEDURE — 77080 DXA BONE DENSITY AXIAL: CPT

## 2023-07-05 ENCOUNTER — HOSPITAL ENCOUNTER (EMERGENCY)
Age: 63
Discharge: HOME | End: 2023-07-06
Attending: EMERGENCY MEDICINE
Payer: MEDICARE

## 2023-07-05 DIAGNOSIS — F32.A DEPRESSION, UNSPECIFIED DEPRESSION TYPE: Primary | ICD-10-CM

## 2023-07-05 DIAGNOSIS — S09.90XA INJURY OF HEAD, INITIAL ENCOUNTER: ICD-10-CM

## 2023-07-05 PROCEDURE — 99284 EMERGENCY DEPT VISIT MOD MDM: CPT

## 2023-07-05 RX ORDER — ACETAMINOPHEN 500 MG
1000 TABLET ORAL ONCE
Status: COMPLETED | OUTPATIENT
Start: 2023-07-05 | End: 2023-07-06

## 2023-07-05 ASSESSMENT — LIFESTYLE VARIABLES
HOW MANY STANDARD DRINKS CONTAINING ALCOHOL DO YOU HAVE ON A TYPICAL DAY: PATIENT DOES NOT DRINK
HOW OFTEN DO YOU HAVE A DRINK CONTAINING ALCOHOL: NEVER

## 2023-07-05 ASSESSMENT — ENCOUNTER SYMPTOMS
NAUSEA: 0
BACK PAIN: 0
EYE DISCHARGE: 0
SHORTNESS OF BREATH: 0
VOMITING: 0
SORE THROAT: 0
COUGH: 0
ABDOMINAL PAIN: 0
EYE REDNESS: 0

## 2023-07-05 ASSESSMENT — PAIN DESCRIPTION - FREQUENCY: FREQUENCY: CONTINUOUS

## 2023-07-05 ASSESSMENT — PAIN DESCRIPTION - PAIN TYPE: TYPE: ACUTE PAIN

## 2023-07-05 ASSESSMENT — PAIN DESCRIPTION - LOCATION: LOCATION: HEAD

## 2023-07-05 ASSESSMENT — PAIN - FUNCTIONAL ASSESSMENT: PAIN_FUNCTIONAL_ASSESSMENT: 0-10

## 2023-07-05 ASSESSMENT — PAIN DESCRIPTION - DESCRIPTORS: DESCRIPTORS: DISCOMFORT

## 2023-07-05 ASSESSMENT — PAIN DESCRIPTION - ORIENTATION: ORIENTATION: MID

## 2023-07-06 ENCOUNTER — APPOINTMENT (OUTPATIENT)
Dept: CT IMAGING | Age: 63
End: 2023-07-06
Payer: MEDICARE

## 2023-07-06 VITALS
HEART RATE: 72 BPM | WEIGHT: 205 LBS | OXYGEN SATURATION: 98 % | RESPIRATION RATE: 16 BRPM | HEIGHT: 62 IN | SYSTOLIC BLOOD PRESSURE: 128 MMHG | DIASTOLIC BLOOD PRESSURE: 66 MMHG | BODY MASS INDEX: 37.73 KG/M2 | TEMPERATURE: 98.1 F

## 2023-07-06 LAB
EKG ATRIAL RATE: 76 BPM
EKG P AXIS: 65 DEGREES
EKG P-R INTERVAL: 206 MS
EKG Q-T INTERVAL: 460 MS
EKG QRS DURATION: 174 MS
EKG QTC CALCULATION (BAZETT): 517 MS
EKG R AXIS: -25 DEGREES
EKG T AXIS: 82 DEGREES
EKG VENTRICULAR RATE: 76 BPM

## 2023-07-06 PROCEDURE — 72125 CT NECK SPINE W/O DYE: CPT

## 2023-07-06 PROCEDURE — 70450 CT HEAD/BRAIN W/O DYE: CPT

## 2023-07-06 PROCEDURE — 6370000000 HC RX 637 (ALT 250 FOR IP): Performed by: EMERGENCY MEDICINE

## 2023-07-06 PROCEDURE — 93010 ELECTROCARDIOGRAM REPORT: CPT | Performed by: INTERNAL MEDICINE

## 2023-07-06 PROCEDURE — 93005 ELECTROCARDIOGRAM TRACING: CPT

## 2023-07-06 RX ADMIN — ACETAMINOPHEN 1000 MG: 500 TABLET, FILM COATED ORAL at 00:04

## 2023-07-06 ASSESSMENT — PAIN SCALES - GENERAL
PAINLEVEL_OUTOF10: 3
PAINLEVEL_OUTOF10: 8

## 2023-07-06 ASSESSMENT — PAIN - FUNCTIONAL ASSESSMENT: PAIN_FUNCTIONAL_ASSESSMENT: 0-10

## 2023-07-06 ASSESSMENT — PAIN DESCRIPTION - LOCATION
LOCATION: HEAD
LOCATION: HEAD

## 2023-07-06 ASSESSMENT — PAIN DESCRIPTION - DESCRIPTORS
DESCRIPTORS: THROBBING
DESCRIPTORS: SORE

## 2023-07-06 ASSESSMENT — PAIN DESCRIPTION - PAIN TYPE: TYPE: ACUTE PAIN

## 2023-07-06 ASSESSMENT — PAIN DESCRIPTION - ONSET: ONSET: ON-GOING

## 2023-07-06 ASSESSMENT — PAIN DESCRIPTION - FREQUENCY: FREQUENCY: CONTINUOUS

## 2023-07-06 NOTE — ED PROVIDER NOTES
[address] [facility] ED  EMERGENCY DEPARTMENT ENCOUNTER      Pt Name: Manny Rose  MRN: [id number]  [address] [facility] 1960  Date of evaluation: 7/5/2023  Provider: Leonora Vazquez DO        HISTORY OF PRESENT ILLNESS    Manny Rose is a 70 y.o. female per chart review has ah/o depression, asthma, CI, dm, HTN, hyperlipidemia. She presents with a head injury. The history is provided by the patient. Head Injury  Location:  Occipital  Mechanism of injury: fall    Fall:     Fall occurred:  From a stool    Impact surface:  Hard floor    Point of impact:  Head    Entrapped after fall: no    Pain details:     Quality:  Aching    Severity:  Mild    Timing:  Constant    Progression:  Unchanged  Chronicity:  New  Relieved by:  Nothing  Worsened by:  Nothing  Ineffective treatments:  None tried  Associated symptoms: headache    Associated symptoms: no loss of consciousness, no nausea, no neck pain and no vomiting    Risk factors: being elderly           REVIEW OF SYSTEMS       Review of Systems   Constitutional:  Negative for chills and fever. HENT:  Negative for ear pain and sore throat. Eyes:  Negative for discharge and redness. Respiratory:  Negative for cough and shortness of breath. Cardiovascular:  Negative for chest pain and palpitations. Gastrointestinal:  Negative for abdominal pain, nausea and vomiting. Genitourinary:  Negative for difficulty urinating and dysuria. Musculoskeletal:  Negative for back pain and neck pain. Skin:  Negative for rash and wound. Neurological:  Positive for dizziness and headaches. Negative for loss of consciousness and syncope. Psychiatric/Behavioral:  Negative for confusion. The patient is not nervous/anxious. All other systems reviewed and are negative. Except as noted above the remainder of the review of systems was reviewed and negative.        PAST MEDICAL HISTORY     Past Medical History:   Diagnosis Date    Asthma     Cerebral artery occlusion with

## 2023-07-06 NOTE — ED TRIAGE NOTES
Pt presents to ER after falling off of a chair onto her back onto the floor while trying to get on top of the refrigerator. Pt denies LOC. Pt c/o head and back pain.

## 2023-07-13 ENCOUNTER — HOSPITAL ENCOUNTER (OUTPATIENT)
Age: 63
Setting detail: OBSERVATION
Discharge: HOME | End: 2023-07-15
Attending: EMERGENCY MEDICINE | Admitting: INTERNAL MEDICINE
Payer: MEDICARE

## 2023-07-13 ENCOUNTER — APPOINTMENT (OUTPATIENT)
Dept: ULTRASOUND IMAGING | Age: 63
End: 2023-07-13
Payer: MEDICARE

## 2023-07-13 ENCOUNTER — APPOINTMENT (OUTPATIENT)
Dept: CT IMAGING | Age: 63
End: 2023-07-13
Payer: MEDICARE

## 2023-07-13 DIAGNOSIS — R55 SYNCOPE AND COLLAPSE: Primary | ICD-10-CM

## 2023-07-13 PROBLEM — R42 DIZZINESS AND GIDDINESS: Status: ACTIVE | Noted: 2023-07-13

## 2023-07-13 LAB
ALBUMIN SERPL-MCNC: 4.6 G/DL (ref 3.5–4.6)
ALP SERPL-CCNC: 60 U/L (ref 40–130)
ALT SERPL-CCNC: 28 U/L (ref 0–33)
AMORPH SED URNS QL MICRO: ABNORMAL
ANION GAP SERPL CALCULATED.3IONS-SCNC: 13 MEQ/L (ref 9–15)
AST SERPL-CCNC: 31 U/L (ref 0–35)
BACTERIA URNS QL MICRO: ABNORMAL /HPF
BASOPHILS # BLD: 0 K/UL (ref 0–0.1)
BASOPHILS NFR BLD: 0.5 % (ref 0.1–1.2)
BILIRUB SERPL-MCNC: 1.2 MG/DL (ref 0.2–0.7)
BILIRUB UR QL STRIP: NEGATIVE
BUN SERPL-MCNC: 27 MG/DL (ref 8–23)
CALCIUM SERPL-MCNC: 10 MG/DL (ref 8.5–9.9)
CHLORIDE SERPL-SCNC: 101 MEQ/L (ref 95–107)
CLARITY UR: CLEAR
CO2 SERPL-SCNC: 24 MEQ/L (ref 20–31)
COLOR UR: YELLOW
CREAT SERPL-MCNC: 0.83 MG/DL (ref 0.5–0.9)
CRYSTALS URNS MICRO: ABNORMAL /HPF
EOSINOPHIL # BLD: 0.1 K/UL (ref 0–0.4)
EOSINOPHIL NFR BLD: 2.7 % (ref 0.7–5.8)
EPI CELLS #/AREA URNS HPF: ABNORMAL /HPF
ERYTHROCYTE [DISTWIDTH] IN BLOOD BY AUTOMATED COUNT: 13.1 % (ref 11.7–14.4)
GLOBULIN SER CALC-MCNC: 3.5 G/DL (ref 2.3–3.5)
GLUCOSE SERPL-MCNC: 144 MG/DL (ref 70–99)
GLUCOSE UR STRIP-MCNC: NEGATIVE MG/DL
HCT VFR BLD AUTO: 42 % (ref 37–47)
HGB BLD-MCNC: 13.7 G/DL (ref 11.2–15.7)
HGB UR QL STRIP: NEGATIVE
IMM GRANULOCYTES # BLD: 0 K/UL
IMM GRANULOCYTES NFR BLD: 0.5 %
INR PPP: 1
KETONES UR STRIP-MCNC: NEGATIVE MG/DL
LACTATE BLDV-SCNC: 1.5 MMOL/L (ref 0.5–2.2)
LEUKOCYTE ESTERASE UR QL STRIP: NORMAL
LIPASE SERPL-CCNC: 34 U/L (ref 12–95)
LYMPHOCYTES # BLD: 1 K/UL (ref 1.2–3.7)
LYMPHOCYTES NFR BLD: 23.8 %
MAGNESIUM SERPL-MCNC: 2.4 MG/DL (ref 1.7–2.4)
MCH RBC QN AUTO: 29.5 PG (ref 25.6–32.2)
MCHC RBC AUTO-ENTMCNC: 32.6 % (ref 32.2–35.5)
MCV RBC AUTO: 90.3 FL (ref 79.4–94.8)
MONOCYTES # BLD: 0.5 K/UL (ref 0.2–0.9)
MONOCYTES NFR BLD: 11.2 % (ref 4.7–12.5)
NEUTROPHILS # BLD: 2.5 K/UL (ref 1.6–6.1)
NEUTS SEG NFR BLD: 61.3 % (ref 34–71.1)
NITRITE UR QL STRIP: NEGATIVE
PH UR STRIP: 6 [PH] (ref 5–9)
PLATELET # BLD AUTO: 204 K/UL (ref 182–369)
POTASSIUM SERPL-SCNC: 4 MEQ/L (ref 3.4–4.9)
PROT SERPL-MCNC: 8.1 G/DL (ref 6.3–8)
PROT UR STRIP-MCNC: NEGATIVE MG/DL
PROTHROMBIN TIME: 13.4 SEC (ref 12.3–14.9)
RBC # BLD AUTO: 4.65 M/UL (ref 3.93–5.22)
RBC #/AREA URNS HPF: ABNORMAL /HPF (ref 0–2)
SODIUM SERPL-SCNC: 138 MEQ/L (ref 135–144)
SP GR UR STRIP: 1.01 (ref 1–1.03)
TROPONIN T SERPL-MCNC: <0.01 NG/ML (ref 0–0.01)
TROPONIN T SERPL-MCNC: <0.01 NG/ML (ref 0–0.01)
UROBILINOGEN UR STRIP-ACNC: 0.2 E.U./DL
WBC # BLD AUTO: 4 K/UL (ref 4–10)
WBC #/AREA URNS HPF: ABNORMAL /HPF (ref 0–5)

## 2023-07-13 PROCEDURE — 6360000002 HC RX W HCPCS: Performed by: INTERNAL MEDICINE

## 2023-07-13 PROCEDURE — G0378 HOSPITAL OBSERVATION PER HR: HCPCS

## 2023-07-13 PROCEDURE — 6360000002 HC RX W HCPCS: Performed by: EMERGENCY MEDICINE

## 2023-07-13 PROCEDURE — 70450 CT HEAD/BRAIN W/O DYE: CPT

## 2023-07-13 PROCEDURE — 99285 EMERGENCY DEPT VISIT HI MDM: CPT

## 2023-07-13 PROCEDURE — 83605 ASSAY OF LACTIC ACID: CPT

## 2023-07-13 PROCEDURE — 96374 THER/PROPH/DIAG INJ IV PUSH: CPT

## 2023-07-13 PROCEDURE — 81001 URINALYSIS AUTO W/SCOPE: CPT

## 2023-07-13 PROCEDURE — 2580000003 HC RX 258: Performed by: INTERNAL MEDICINE

## 2023-07-13 PROCEDURE — 85610 PROTHROMBIN TIME: CPT

## 2023-07-13 PROCEDURE — 93005 ELECTROCARDIOGRAM TRACING: CPT

## 2023-07-13 PROCEDURE — 2580000003 HC RX 258: Performed by: EMERGENCY MEDICINE

## 2023-07-13 PROCEDURE — 83690 ASSAY OF LIPASE: CPT

## 2023-07-13 PROCEDURE — 36415 COLL VENOUS BLD VENIPUNCTURE: CPT

## 2023-07-13 PROCEDURE — 96372 THER/PROPH/DIAG INJ SC/IM: CPT

## 2023-07-13 PROCEDURE — 85025 COMPLETE CBC W/AUTO DIFF WBC: CPT

## 2023-07-13 PROCEDURE — 84484 ASSAY OF TROPONIN QUANT: CPT

## 2023-07-13 PROCEDURE — 93880 EXTRACRANIAL BILAT STUDY: CPT

## 2023-07-13 PROCEDURE — 6370000000 HC RX 637 (ALT 250 FOR IP): Performed by: INTERNAL MEDICINE

## 2023-07-13 PROCEDURE — 80053 COMPREHEN METABOLIC PANEL: CPT

## 2023-07-13 PROCEDURE — 96361 HYDRATE IV INFUSION ADD-ON: CPT

## 2023-07-13 PROCEDURE — 83735 ASSAY OF MAGNESIUM: CPT

## 2023-07-13 RX ORDER — ONDANSETRON 2 MG/ML
4 INJECTION INTRAMUSCULAR; INTRAVENOUS ONCE
Status: COMPLETED | OUTPATIENT
Start: 2023-07-13 | End: 2023-07-13

## 2023-07-13 RX ORDER — CLOPIDOGREL BISULFATE 75 MG/1
75 TABLET ORAL DAILY
Status: DISCONTINUED | OUTPATIENT
Start: 2023-07-13 | End: 2023-07-15 | Stop reason: HOSPADM

## 2023-07-13 RX ORDER — ONDANSETRON 2 MG/ML
4 INJECTION INTRAMUSCULAR; INTRAVENOUS EVERY 6 HOURS PRN
Status: DISCONTINUED | OUTPATIENT
Start: 2023-07-13 | End: 2023-07-13

## 2023-07-13 RX ORDER — ASPIRIN 81 MG/1
81 TABLET, CHEWABLE ORAL DAILY
Status: DISCONTINUED | OUTPATIENT
Start: 2023-07-13 | End: 2023-07-15 | Stop reason: HOSPADM

## 2023-07-13 RX ORDER — ENOXAPARIN SODIUM 100 MG/ML
40 INJECTION SUBCUTANEOUS EVERY EVENING
Status: DISCONTINUED | OUTPATIENT
Start: 2023-07-13 | End: 2023-07-15 | Stop reason: HOSPADM

## 2023-07-13 RX ORDER — ATORVASTATIN CALCIUM 40 MG/1
40 TABLET, FILM COATED ORAL DAILY
Status: DISCONTINUED | OUTPATIENT
Start: 2023-07-13 | End: 2023-07-14

## 2023-07-13 RX ORDER — ACETAMINOPHEN 650 MG/1
650 SUPPOSITORY RECTAL EVERY 6 HOURS PRN
Status: DISCONTINUED | OUTPATIENT
Start: 2023-07-13 | End: 2023-07-15 | Stop reason: HOSPADM

## 2023-07-13 RX ORDER — MECLIZINE HCL 12.5 MG/1
25 TABLET ORAL 3 TIMES DAILY
Status: DISCONTINUED | OUTPATIENT
Start: 2023-07-13 | End: 2023-07-15 | Stop reason: HOSPADM

## 2023-07-13 RX ORDER — SODIUM CHLORIDE 0.9 % (FLUSH) 0.9 %
5-40 SYRINGE (ML) INJECTION PRN
Status: DISCONTINUED | OUTPATIENT
Start: 2023-07-13 | End: 2023-07-15 | Stop reason: HOSPADM

## 2023-07-13 RX ORDER — SODIUM CHLORIDE 9 MG/ML
INJECTION, SOLUTION INTRAVENOUS CONTINUOUS
Status: DISCONTINUED | OUTPATIENT
Start: 2023-07-13 | End: 2023-07-15

## 2023-07-13 RX ORDER — ONDANSETRON 4 MG/1
4 TABLET, ORALLY DISINTEGRATING ORAL EVERY 8 HOURS PRN
Status: DISCONTINUED | OUTPATIENT
Start: 2023-07-13 | End: 2023-07-13

## 2023-07-13 RX ORDER — SODIUM CHLORIDE 0.9 % (FLUSH) 0.9 %
5-40 SYRINGE (ML) INJECTION EVERY 12 HOURS SCHEDULED
Status: DISCONTINUED | OUTPATIENT
Start: 2023-07-13 | End: 2023-07-15 | Stop reason: HOSPADM

## 2023-07-13 RX ORDER — ACETAMINOPHEN 325 MG/1
650 TABLET ORAL EVERY 6 HOURS PRN
Status: DISCONTINUED | OUTPATIENT
Start: 2023-07-13 | End: 2023-07-15 | Stop reason: HOSPADM

## 2023-07-13 RX ORDER — SODIUM CHLORIDE 9 MG/ML
INJECTION, SOLUTION INTRAVENOUS PRN
Status: DISCONTINUED | OUTPATIENT
Start: 2023-07-13 | End: 2023-07-15 | Stop reason: HOSPADM

## 2023-07-13 RX ORDER — POLYETHYLENE GLYCOL 3350 17 G/17G
17 POWDER, FOR SOLUTION ORAL DAILY PRN
Status: DISCONTINUED | OUTPATIENT
Start: 2023-07-13 | End: 2023-07-15 | Stop reason: HOSPADM

## 2023-07-13 RX ORDER — 0.9 % SODIUM CHLORIDE 0.9 %
1000 INTRAVENOUS SOLUTION INTRAVENOUS ONCE
Status: COMPLETED | OUTPATIENT
Start: 2023-07-13 | End: 2023-07-13

## 2023-07-13 RX ORDER — 0.9 % SODIUM CHLORIDE 0.9 %
500 INTRAVENOUS SOLUTION INTRAVENOUS ONCE
Status: COMPLETED | OUTPATIENT
Start: 2023-07-13 | End: 2023-07-13

## 2023-07-13 RX ADMIN — SERTRALINE 50 MG: 50 TABLET, FILM COATED ORAL at 20:25

## 2023-07-13 RX ADMIN — ONDANSETRON 4 MG: 2 INJECTION INTRAMUSCULAR; INTRAVENOUS at 14:43

## 2023-07-13 RX ADMIN — SODIUM CHLORIDE: 9 INJECTION, SOLUTION INTRAVENOUS at 17:14

## 2023-07-13 RX ADMIN — MECLIZINE 25 MG: 12.5 TABLET ORAL at 20:24

## 2023-07-13 RX ADMIN — ATORVASTATIN CALCIUM 40 MG: 40 TABLET, FILM COATED ORAL at 17:15

## 2023-07-13 RX ADMIN — SODIUM CHLORIDE 500 ML: 9 INJECTION, SOLUTION INTRAVENOUS at 13:15

## 2023-07-13 RX ADMIN — ASPIRIN 81 MG CHEWABLE TABLET 81 MG: 81 TABLET CHEWABLE at 17:15

## 2023-07-13 RX ADMIN — ENOXAPARIN SODIUM 40 MG: 100 INJECTION SUBCUTANEOUS at 17:16

## 2023-07-13 RX ADMIN — SODIUM CHLORIDE 1000 ML: 9 INJECTION, SOLUTION INTRAVENOUS at 10:57

## 2023-07-13 RX ADMIN — MECLIZINE 25 MG: 12.5 TABLET ORAL at 17:15

## 2023-07-13 RX ADMIN — CLOPIDOGREL BISULFATE 75 MG: 75 TABLET ORAL at 17:15

## 2023-07-13 ASSESSMENT — PAIN - FUNCTIONAL ASSESSMENT: PAIN_FUNCTIONAL_ASSESSMENT: NONE - DENIES PAIN

## 2023-07-13 NOTE — ED TRIAGE NOTES
Pt a/ox3 skin w d intact  pt states when she went to get up this morning she felt dizzy     nop other complaints  pt was seen for head injury on the 5th

## 2023-07-13 NOTE — ED NOTES
Pt reports  dizziness with standing for orthos   dr notified  pt agreeable  to being admitted      [facility] , RN  07/13/23 7922

## 2023-07-14 ENCOUNTER — HOSPITAL ENCOUNTER (OUTPATIENT)
Dept: MRI IMAGING | Age: 63
End: 2023-07-14
Attending: INTERNAL MEDICINE
Payer: MEDICARE

## 2023-07-14 ENCOUNTER — APPOINTMENT (OUTPATIENT)
Dept: GENERAL RADIOLOGY | Age: 63
End: 2023-07-14
Payer: MEDICARE

## 2023-07-14 LAB
ANION GAP SERPL CALCULATED.3IONS-SCNC: 11 MEQ/L (ref 9–15)
BASOPHILS # BLD: 0 K/UL (ref 0–0.1)
BASOPHILS NFR BLD: 0.4 % (ref 0.1–1.2)
BUN SERPL-MCNC: 19 MG/DL (ref 8–23)
CALCIUM SERPL-MCNC: 9 MG/DL (ref 8.5–9.9)
CHLORIDE SERPL-SCNC: 108 MEQ/L (ref 95–107)
CO2 SERPL-SCNC: 22 MEQ/L (ref 20–31)
CORTISOL COLLECTION INFO: NORMAL
CORTISOL: 9.7 UG/DL (ref 2.7–18.4)
CREAT SERPL-MCNC: 0.71 MG/DL (ref 0.5–0.9)
EKG ATRIAL RATE: 65 BPM
EKG ATRIAL RATE: 69 BPM
EKG P AXIS: 31 DEGREES
EKG P AXIS: 60 DEGREES
EKG P-R INTERVAL: 202 MS
EKG P-R INTERVAL: 204 MS
EKG Q-T INTERVAL: 466 MS
EKG Q-T INTERVAL: 492 MS
EKG QRS DURATION: 172 MS
EKG QRS DURATION: 174 MS
EKG QTC CALCULATION (BAZETT): 499 MS
EKG QTC CALCULATION (BAZETT): 511 MS
EKG R AXIS: -13 DEGREES
EKG R AXIS: -15 DEGREES
EKG T AXIS: 76 DEGREES
EKG T AXIS: 84 DEGREES
EKG VENTRICULAR RATE: 65 BPM
EKG VENTRICULAR RATE: 69 BPM
EOSINOPHIL # BLD: 0.1 K/UL (ref 0–0.4)
EOSINOPHIL NFR BLD: 2.9 % (ref 0.7–5.8)
ERYTHROCYTE [DISTWIDTH] IN BLOOD BY AUTOMATED COUNT: 13.2 % (ref 11.7–14.4)
GLUCOSE BLD-MCNC: 104 MG/DL (ref 70–99)
GLUCOSE BLD-MCNC: 116 MG/DL (ref 70–99)
GLUCOSE BLD-MCNC: 125 MG/DL (ref 70–99)
GLUCOSE BLD-MCNC: 85 MG/DL (ref 70–99)
GLUCOSE SERPL-MCNC: 109 MG/DL (ref 70–99)
HBA1C MFR BLD: 6.6 % (ref 4.8–5.9)
HCT VFR BLD AUTO: 38.2 % (ref 37–47)
HGB BLD-MCNC: 12.5 G/DL (ref 11.2–15.7)
IMM GRANULOCYTES # BLD: 0 K/UL
IMM GRANULOCYTES NFR BLD: 0.4 %
LYMPHOCYTES # BLD: 1.1 K/UL (ref 1.2–3.7)
LYMPHOCYTES NFR BLD: 25.4 %
MCH RBC QN AUTO: 30.1 PG (ref 25.6–32.2)
MCHC RBC AUTO-ENTMCNC: 32.7 % (ref 32.2–35.5)
MCV RBC AUTO: 92 FL (ref 79.4–94.8)
MONOCYTES # BLD: 0.6 K/UL (ref 0.2–0.9)
MONOCYTES NFR BLD: 12.6 % (ref 4.7–12.5)
NEUTROPHILS # BLD: 2.6 K/UL (ref 1.6–6.1)
NEUTS SEG NFR BLD: 58.3 % (ref 34–71.1)
PERFORMED ON: ABNORMAL
PERFORMED ON: NORMAL
PLATELET # BLD AUTO: 188 K/UL (ref 182–369)
POTASSIUM SERPL-SCNC: 3.8 MEQ/L (ref 3.4–4.9)
RBC # BLD AUTO: 4.15 M/UL (ref 3.93–5.22)
SODIUM SERPL-SCNC: 141 MEQ/L (ref 135–144)
TSH SERPL-MCNC: 4.87 UIU/ML (ref 0.44–3.86)
WBC # BLD AUTO: 4.5 K/UL (ref 4–10)

## 2023-07-14 PROCEDURE — 36415 COLL VENOUS BLD VENIPUNCTURE: CPT

## 2023-07-14 PROCEDURE — 82533 TOTAL CORTISOL: CPT

## 2023-07-14 PROCEDURE — 2580000003 HC RX 258: Performed by: INTERNAL MEDICINE

## 2023-07-14 PROCEDURE — 6360000002 HC RX W HCPCS: Performed by: INTERNAL MEDICINE

## 2023-07-14 PROCEDURE — 97166 OT EVAL MOD COMPLEX 45 MIN: CPT

## 2023-07-14 PROCEDURE — 70553 MRI BRAIN STEM W/O & W/DYE: CPT

## 2023-07-14 PROCEDURE — 73600 X-RAY EXAM OF ANKLE: CPT

## 2023-07-14 PROCEDURE — 84443 ASSAY THYROID STIM HORMONE: CPT

## 2023-07-14 PROCEDURE — G0378 HOSPITAL OBSERVATION PER HR: HCPCS

## 2023-07-14 PROCEDURE — 97530 THERAPEUTIC ACTIVITIES: CPT

## 2023-07-14 PROCEDURE — 85025 COMPLETE CBC W/AUTO DIFF WBC: CPT

## 2023-07-14 PROCEDURE — 96372 THER/PROPH/DIAG INJ SC/IM: CPT

## 2023-07-14 PROCEDURE — 6360000004 HC RX CONTRAST MEDICATION: Performed by: INTERNAL MEDICINE

## 2023-07-14 PROCEDURE — 96361 HYDRATE IV INFUSION ADD-ON: CPT

## 2023-07-14 PROCEDURE — 80048 BASIC METABOLIC PNL TOTAL CA: CPT

## 2023-07-14 PROCEDURE — 6370000000 HC RX 637 (ALT 250 FOR IP): Performed by: INTERNAL MEDICINE

## 2023-07-14 PROCEDURE — 83036 HEMOGLOBIN GLYCOSYLATED A1C: CPT

## 2023-07-14 PROCEDURE — 97162 PT EVAL MOD COMPLEX 30 MIN: CPT

## 2023-07-14 PROCEDURE — 97535 SELF CARE MNGMENT TRAINING: CPT

## 2023-07-14 PROCEDURE — A9579 GAD-BASE MR CONTRAST NOS,1ML: HCPCS | Performed by: INTERNAL MEDICINE

## 2023-07-14 RX ORDER — ATORVASTATIN CALCIUM 40 MG/1
40 TABLET, FILM COATED ORAL NIGHTLY
Status: DISCONTINUED | OUTPATIENT
Start: 2023-07-14 | End: 2023-07-15 | Stop reason: HOSPADM

## 2023-07-14 RX ADMIN — SODIUM CHLORIDE: 9 INJECTION, SOLUTION INTRAVENOUS at 19:47

## 2023-07-14 RX ADMIN — ASPIRIN 81 MG CHEWABLE TABLET 81 MG: 81 TABLET CHEWABLE at 08:51

## 2023-07-14 RX ADMIN — MECLIZINE 25 MG: 12.5 TABLET ORAL at 08:50

## 2023-07-14 RX ADMIN — SERTRALINE 50 MG: 50 TABLET, FILM COATED ORAL at 08:50

## 2023-07-14 RX ADMIN — ATORVASTATIN CALCIUM 40 MG: 40 TABLET, FILM COATED ORAL at 19:47

## 2023-07-14 RX ADMIN — SERTRALINE 50 MG: 50 TABLET, FILM COATED ORAL at 19:47

## 2023-07-14 RX ADMIN — MECLIZINE 25 MG: 12.5 TABLET ORAL at 16:16

## 2023-07-14 RX ADMIN — SODIUM CHLORIDE: 9 INJECTION, SOLUTION INTRAVENOUS at 04:00

## 2023-07-14 RX ADMIN — ACETAMINOPHEN 650 MG: 325 TABLET ORAL at 16:16

## 2023-07-14 RX ADMIN — MECLIZINE 25 MG: 12.5 TABLET ORAL at 19:47

## 2023-07-14 RX ADMIN — GADOTERIDOL 20 ML: 279.3 INJECTION, SOLUTION INTRAVENOUS at 14:29

## 2023-07-14 RX ADMIN — CLOPIDOGREL BISULFATE 75 MG: 75 TABLET ORAL at 08:51

## 2023-07-14 RX ADMIN — ENOXAPARIN SODIUM 40 MG: 100 INJECTION SUBCUTANEOUS at 19:51

## 2023-07-14 ASSESSMENT — PAIN SCALES - GENERAL: PAINLEVEL_OUTOF10: 0

## 2023-07-14 NOTE — H&P
Hospital Medicine History & Physical      PCP: DIAMANTE Clark CNP    Date of Admission: 7/13/2023    Date of Service: 7/14/23      Chief Complaint:  dizziness/fall      History Of Present Illness:  70 y.o. female who presented to [facility] with above complains. She suffered from mechanical fall on July 5 when she was trying to get up from chair and became dizzy and fell backwards, had head trauma, was evaluated in ER and was released home. She was doing well until tomorrow AM when she was trying to get up from bed and immediate became dizzy. Dizziness was persistent with each attempt to get up. She received IV hydration in ER without much improvement to her dizziness. Denied fever/dyspnea, new muscular weakness or paraesthesia. She had history CVA x2   After initial stabilization in ERw as admitted for further management       Past Medical History:          Diagnosis Date    Asthma     Cerebral artery occlusion with cerebral infarction ([address])     Diabetes mellitus ([address])     H/O heart artery stent     Hyperlipidemia     Hypertension        Past Surgical History:          Procedure Laterality Date    CARPAL TUNNEL RELEASE Bilateral     DILATION AND CURETTAGE OF UTERUS      TUBAL LIGATION         Medications Prior to Admission:      Prior to Admission medications    Medication Sig Start Date End Date Taking?  Authorizing Provider   econazole nitrate 1 % cream apply topically to affected area SPARINGLY twice a day for 1 to 2 weeks  Patient not taking: Reported on 7/13/2023 7/28/21   Historical Provider, MD   methylPREDNISolone (MEDROL DOSEPACK) 4 MG tablet use as directed for 6 days  Patient not taking: Reported on 7/13/2023 7/28/21   Historical Provider, MD   fluconazole (DIFLUCAN) 150 MG tablet take 1 tablet by mouth immediately then repeat in 4 to 7 days if needed  Patient not taking: Reported on 7/13/2023 8/3/21   Historical Provider, MD   fluticasone (FLONASE) 50 MCG/ACT nasal spray 1 spray by

## 2023-07-14 NOTE — CONSULTS
throughout. .    Abdomen: grossly normal.   Extremities: no clubbing, cyanosis, or edema. Neurological Examination --   Mental status: The patient is alert. Orientation is to person, place, and time. Thought content and form is normal. Comprehension is normal. Phonation, articulation, resonance, and prosody are normal.    CNN: Pupils are equal, 4 mm OU, round, and normally reactive to light and accommodation, both directly and consensually. Visual fields are full to confrontation. Ptosis is absent. Extra-ocular movements are full. Nystagmus is not observed. Funduscopic exam is normal. Masseters are of normal strength. Facial movement is normal.  Hearing is grossly normal. There is no dysarthria. The gag reflex is strong bilaterally. Sternocleidomastoids and trapezii are of normal strength. The tongue in the midline. Motor: Muscle testing including , finger abductors, biceps, triceps, deltoid, toe flexors and extensors, tibialis anterior, triceps surae, quadriceps femoris, biceps femoris, and iliopsoases was normal. Tone is normal. Muscle bulk is normal. Fasciculations are not seen. Pronator drift was not evident. Sensory: Sensation to to touch, temperature, and vibration was normal in the arms. Temp >> vibr loss distal LEs. Romberg is positive. Reflexes:  biceps triceps brachioradialis patellar Achilles plantar   R 2+ 2+ 2+ 2+ 2+ flexor    L 2+ 2+ 2+ 2+ 2+ flexor   Coordination: Dysmetria and dysdiadochokinesia are absent. Tremor is absent; dystonia is absent; chorea is absent. Romberg 2+  Gait: Station and gait are normal. Ataxia is absent. Apraxia and spasticity are not evident. Arm swing is normal. Toe, heel, and tandem walking are performed without difficulty. Tandem 3+  Musculoskeletal: Scoliosis and lordosis are not evident. Trapezii and paraspinal muscles demonstrate moderate spasm. Trigger-point tenderness on palpation was absent on palpation of the paraspinal musculature.          PMH --  Past

## 2023-07-15 VITALS
HEIGHT: 62 IN | BODY MASS INDEX: 37.73 KG/M2 | RESPIRATION RATE: 18 BRPM | DIASTOLIC BLOOD PRESSURE: 57 MMHG | WEIGHT: 205 LBS | TEMPERATURE: 97.9 F | HEART RATE: 56 BPM | OXYGEN SATURATION: 97 % | SYSTOLIC BLOOD PRESSURE: 107 MMHG

## 2023-07-15 LAB
C-REACTIVE PROTEIN, HIGH SENSITIVITY: 0.7 MG/L (ref 0–5)
ERYTHROCYTE [SEDIMENTATION RATE] IN BLOOD BY WESTERGREN METHOD: 21 MM (ref 0–30)
FOLATE: >20 NG/ML
GLUCOSE BLD-MCNC: 109 MG/DL (ref 70–99)
HOMOCYSTEINE: 6.6 UMOL/L
PERFORMED ON: ABNORMAL
RHEUMATOID FACTOR: 103.1 IU/ML
VITAMIN B-12: 972 PG/ML (ref 232–1245)

## 2023-07-15 PROCEDURE — G0378 HOSPITAL OBSERVATION PER HR: HCPCS

## 2023-07-15 PROCEDURE — 86789 WEST NILE VIRUS ANTIBODY: CPT

## 2023-07-15 PROCEDURE — 86235 NUCLEAR ANTIGEN ANTIBODY: CPT

## 2023-07-15 PROCEDURE — 86039 ANTINUCLEAR ANTIBODIES (ANA): CPT

## 2023-07-15 PROCEDURE — 82746 ASSAY OF FOLIC ACID SERUM: CPT

## 2023-07-15 PROCEDURE — 83921 ORGANIC ACID SINGLE QUANT: CPT

## 2023-07-15 PROCEDURE — 6370000000 HC RX 637 (ALT 250 FOR IP): Performed by: INTERNAL MEDICINE

## 2023-07-15 PROCEDURE — 83090 ASSAY OF HOMOCYSTEINE: CPT

## 2023-07-15 PROCEDURE — 86256 FLUORESCENT ANTIBODY TITER: CPT

## 2023-07-15 PROCEDURE — 84155 ASSAY OF PROTEIN SERUM: CPT

## 2023-07-15 PROCEDURE — 96361 HYDRATE IV INFUSION ADD-ON: CPT

## 2023-07-15 PROCEDURE — 85652 RBC SED RATE AUTOMATED: CPT

## 2023-07-15 PROCEDURE — 2580000003 HC RX 258: Performed by: INTERNAL MEDICINE

## 2023-07-15 PROCEDURE — 86592 SYPHILIS TEST NON-TREP QUAL: CPT

## 2023-07-15 PROCEDURE — 82607 VITAMIN B-12: CPT

## 2023-07-15 PROCEDURE — 36415 COLL VENOUS BLD VENIPUNCTURE: CPT

## 2023-07-15 PROCEDURE — 86431 RHEUMATOID FACTOR QUANT: CPT

## 2023-07-15 PROCEDURE — 86225 DNA ANTIBODY NATIVE: CPT

## 2023-07-15 PROCEDURE — 84165 PROTEIN E-PHORESIS SERUM: CPT

## 2023-07-15 PROCEDURE — 86141 C-REACTIVE PROTEIN HS: CPT

## 2023-07-15 PROCEDURE — 84207 ASSAY OF VITAMIN B-6: CPT

## 2023-07-15 PROCEDURE — 86038 ANTINUCLEAR ANTIBODIES: CPT

## 2023-07-15 PROCEDURE — 86788 WEST NILE VIRUS AB IGM: CPT

## 2023-07-15 RX ORDER — MECLIZINE HYDROCHLORIDE 25 MG/1
25 TABLET ORAL 3 TIMES DAILY
Qty: 30 TABLET | Refills: 0 | Status: SHIPPED | OUTPATIENT
Start: 2023-07-15 | End: 2023-07-25

## 2023-07-15 RX ADMIN — SERTRALINE 50 MG: 50 TABLET, FILM COATED ORAL at 08:13

## 2023-07-15 RX ADMIN — MECLIZINE 25 MG: 12.5 TABLET ORAL at 08:13

## 2023-07-15 RX ADMIN — SODIUM CHLORIDE: 9 INJECTION, SOLUTION INTRAVENOUS at 06:12

## 2023-07-15 RX ADMIN — ASPIRIN 81 MG CHEWABLE TABLET 81 MG: 81 TABLET CHEWABLE at 08:13

## 2023-07-15 RX ADMIN — CLOPIDOGREL BISULFATE 75 MG: 75 TABLET ORAL at 08:13

## 2023-07-15 NOTE — DISCHARGE SUMMARY
Discharge Summary    Patient:  Raine Haile  YOB: 1960    MRN: [id number]   Acct: [de-identified]    Primary Care Physician: DIAMANTE Waller CNP    Admit date:  7/13/2023    Discharge date:   07/15/23      Discharge Diagnoses:   Dizziness and giddiness  Principal Problem:    Dizziness and giddiness  Resolved Problems:    * No resolved hospital problems. *      Admitted for: [facility] Course: patient was admitted with new onset dizziness, mechanical fall at home, was found to have hypotension. CVA was ruled out. Also was found to have R foot 5th metatarsal fracture-maddox will be applied. After completing her stay patient will be DC home with R foot maddox, follow up with ortho service and neurology for further work up regarding dizziness/sleep apnea      Consultants:  neurology    Discharge Medications:       Medication List        START taking these medications      meclizine 25 MG tablet  Commonly known as: ANTIVERT  Take 1 tablet by mouth in the morning, at noon, and at bedtime for 10 days            CONTINUE taking these medications      aspirin 81 MG tablet     atorvastatin 40 MG tablet  Commonly known as: LIPITOR     Blood Glucose Monitor System w/Device Kit     blood glucose test strips strip  Commonly known as: ASCENSIA AUTODISC VI;ONE TOUCH ULTRA TEST VI     clopidogrel 75 MG tablet  Commonly known as: PLAVIX  Take 1 tablet by mouth daily     nitroGLYCERIN 0.4 MG SL tablet  Commonly known as: NITROSTAT  up to max of 3 total doses.  If no relief after 1 dose, call 911.     sertraline 50 MG tablet  Commonly known as: ZOLOFT     vitamin B-12 1000 MCG tablet  Commonly known as: CYANOCOBALAMIN     vitamin D 1000 UNIT Tabs tablet  Commonly known as: CHOLECALCIFEROL            STOP taking these medications      amLODIPine 5 MG tablet  Commonly known as: NORVASC     clobetasol 0.05 % ointment  Commonly known as: Temovate     econazole nitrate 1 % cream     fluconazole 150 MG

## 2023-07-15 NOTE — PROGRESS NOTES
Ambulatory to the bathroom. Patient provided with a wheeled walker, as she was furniture walking because she felt unsteady. Patient brings to my attention old bruising which is present to her right foot. Her ankle is swollen and the ankle and foot have purple discoloration which wraps around the foot and pools on the bottom of her foot. Patient states she injured her foot in a fall at home \"last week\".  She reports the pain a minimal. Will ask the MD to assess in the am.
Facility/Department: [facility] MED SURG UNIT  Physical Therapy Initial Assessment    Name: Brynn Shukla  : 1960  MRN: [id number]  Date of Service: 2023    Discharge Recommendations:  Home with assist PRN          Patient Diagnosis(es): The encounter diagnosis was Syncope and collapse. Past Medical History:  has a past medical history of Asthma, Cerebral artery occlusion with cerebral infarction ([address]), Diabetes mellitus ([address]), H/O heart artery stent, Hyperlipidemia, and Hypertension. Past Surgical History:  has a past surgical history that includes Dilation and curettage of uterus; Carpal tunnel release (Bilateral); and Tubal ligation. Assessment   Body Structures, Functions, Activity Limitations Requiring Skilled Therapeutic Intervention: Decreased functional mobility ; Decreased strength;Decreased balance;Decreased endurance; Increased pain  Assessment: Pt is a 58 yo female who reports having a fall at home on  and fell getting off of a chair and hit her head and injured her right ankle. Went to ER CT was negative for a brain bleed and xray neg for right ankle fx. Pt went home to recooperate. On  pt reports started not feeling well and very light headed and had trouble standing and feeling like legs would \"give out\"; Came to ER and O2 was at 88% and is going for an MRI of her brain today. PT completed evaluation and pts BP sitting bedside was 126/68 and pulse ox 95% with no reports of lighted or dizziness. Pt then stood bedside with reports of her head feeling \"fuzzy\" so only walked from her bed to her recliner. Pts BP was 119/65 and pulse ox was 96%. Pt was positioned in recliner for comfort. Pt to go for MRI test of her brain at noon. Pt is recommending home with family assistance PRN.   Treatment Diagnosis: syncope, dizziness  Therapy Prognosis: Good  Decision Making: Medium Complexity  Requires PT Follow-Up: Yes     Plan   Physcial Therapy Plan  General Plan: 5-7 times per week (QD to
Order placed for right post op shoe upon request by Dr Dakota Madrid for broken toe. I retrieved the shoe, placed on patient's right foot, msp's intact before and after. Instructions given to patient to watch for MSP's.  at bedside for instructions. No further questions at this time.
Pt alert and oriented. Pt up with a one assist to bathroom. Pt bed alarm is on for safety. Pt denies any pain at this time. Pt has dizziness when standing up. Pt call light in reach. Pt denies any needs at this time. Will continue to monitor pt.   Electronically signed by Hortencia Schneider RN on 7/13/2023 at 9:03 PM
Limits  Orientation Level: Oriented X4                  Education Given To: Patient  Education Provided: Role of Therapy; Fall Prevention Strategies; Plan of Care;Precautions;Transfer Training;Equipment  Education Method: Demonstration;Verbal;Teach Back  Barriers to Learning: None  Education Outcome: Verbalized understanding;Demonstrated understanding;Continued education needed  LUE AROM (degrees)  LUE AROM : WNL  RUE AROM (degrees)  RUE AROM : WNL                  Goals  Short Term Goals  Time Frame for Short Term Goals: 3-5 days (pending results of MRI)  Short Term Goal 1: [place] UB/LB clothing Mod I  Short Term Goal 2: [place] UB/LB Mod I  Short Term Goal 3: Complete toileting Mod I  Short Term Goal 4: Complete functional mobility and transfers Mod I  Short Term Goal 5: Demo BUE HEP I  Patient Goals   Patient goals : \"I want to go home if I can. \"       Therapy Time   Individual Concurrent Group Co-treatment   Time In  11:20am         Time Out  12:05pm         Minutes  [id number] [facility], [id number]

## 2023-07-17 LAB — RPR SER QL: NORMAL

## 2023-07-18 LAB
ANA PAT SER IF-IMP: ABNORMAL
ANA PAT SER IF-IMP: ABNORMAL
NUCLEAR IGG SER QL IA: DETECTED
NUCLEAR IGG SER QL IF: DETECTED
NUCLEAR IGG TITR SER IF: ABNORMAL {TITER}

## 2023-07-19 LAB
ALBUMIN SERPL-MCNC: 3.59 G/DL (ref 3.75–5.01)
ALPHA1 GLOB SERPL ELPH-MCNC: 0.23 G/DL (ref 0.19–0.46)
ALPHA2 GLOB SERPL ELPH-MCNC: 0.85 G/DL (ref 0.48–1.05)
B-GLOBULIN SERPL ELPH-MCNC: 0.85 G/DL (ref 0.48–1.1)
ENA JO1 AB TITR SER: 2 AU/ML (ref 0–40)
ENA RNP IGG SER IA-ACNC: 9 UNITS (ref 0–19)
ENA SCL70 IGG SER QL: 2 AU/ML (ref 0–40)
ENA SM IGG SER-ACNC: 5 AU/ML (ref 0–40)
ENA SS-A 60KD AB SER-ACNC: 130 AU/ML (ref 0–40)
ENA SS-A IGG SER IA-ACNC: 132 AU/ML (ref 0–40)
ENA SS-B IGG SER IA-ACNC: 4 AU/ML (ref 0–40)
GAMMA GLOB SERPL ELPH-MCNC: 0.99 G/DL (ref 0.62–1.51)
INTERPRETATION SERPL IFE-IMP: ABNORMAL
METHYLMALONATE SERPL-SCNC: 0.14 UMOL/L (ref 0–0.4)
MONOCLON BAND OBS SERPL: ABNORMAL
PROT SERPL-MCNC: 6.5 G/DL (ref 6.3–8.2)
VIT B6 SERPL-MCNC: 87.5 NMOL/L (ref 20–125)
WNV IGG SER-ACNC: 0.09 IV
WNV IGM SER-ACNC: 0.01 IV

## 2023-07-23 LAB — DSDNA IGG TITR SER CLIF: NORMAL {TITER}

## 2023-10-09 ENCOUNTER — HOSPITAL ENCOUNTER (OUTPATIENT)
Facility: HOSPITAL | Age: 63
Setting detail: OBSERVATION
LOS: 1 days | Discharge: HOME | DRG: 103 | End: 2023-10-10
Attending: STUDENT IN AN ORGANIZED HEALTH CARE EDUCATION/TRAINING PROGRAM | Admitting: INTERNAL MEDICINE
Payer: MEDICARE

## 2023-10-09 ENCOUNTER — APPOINTMENT (OUTPATIENT)
Dept: RADIOLOGY | Facility: HOSPITAL | Age: 63
End: 2023-10-09
Payer: MEDICARE

## 2023-10-09 DIAGNOSIS — R20.0 LEFT FACIAL NUMBNESS: Primary | ICD-10-CM

## 2023-10-09 LAB
ALBUMIN SERPL BCP-MCNC: 4.2 G/DL (ref 3.4–5)
ALP SERPL-CCNC: 55 U/L (ref 33–136)
ALT SERPL W P-5'-P-CCNC: 26 U/L (ref 7–45)
ANION GAP SERPL CALC-SCNC: 13 MMOL/L (ref 10–20)
AST SERPL W P-5'-P-CCNC: 24 U/L (ref 9–39)
BASOPHILS # BLD AUTO: 0.03 X10*3/UL (ref 0–0.1)
BASOPHILS NFR BLD AUTO: 0.7 %
BILIRUB SERPL-MCNC: 0.6 MG/DL (ref 0–1.2)
BUN SERPL-MCNC: 23 MG/DL (ref 6–23)
CALCIUM SERPL-MCNC: 9.6 MG/DL (ref 8.6–10.3)
CARDIAC TROPONIN I PNL SERPL HS: 3 NG/L (ref 0–13)
CHLORIDE SERPL-SCNC: 103 MMOL/L (ref 98–107)
CO2 SERPL-SCNC: 26 MMOL/L (ref 21–32)
CREAT SERPL-MCNC: 0.9 MG/DL (ref 0.5–1.05)
EOSINOPHIL # BLD AUTO: 0.1 X10*3/UL (ref 0–0.7)
EOSINOPHIL NFR BLD AUTO: 2.3 %
ERYTHROCYTE [DISTWIDTH] IN BLOOD BY AUTOMATED COUNT: 12.4 % (ref 11.5–14.5)
GFR SERPL CREATININE-BSD FRML MDRD: 71 ML/MIN/1.73M*2
GLUCOSE SERPL-MCNC: 93 MG/DL (ref 74–99)
HCT VFR BLD AUTO: 44.2 % (ref 36–46)
HGB BLD-MCNC: 14.7 G/DL (ref 12–16)
IMM GRANULOCYTES # BLD AUTO: 0.02 X10*3/UL (ref 0–0.7)
IMM GRANULOCYTES NFR BLD AUTO: 0.5 % (ref 0–0.9)
INR PPP: 1.1 (ref 0.9–1.1)
LYMPHOCYTES # BLD AUTO: 1.45 X10*3/UL (ref 1.2–4.8)
LYMPHOCYTES NFR BLD AUTO: 33.9 %
MCH RBC QN AUTO: 29.8 PG (ref 26–34)
MCHC RBC AUTO-ENTMCNC: 33.3 G/DL (ref 32–36)
MCV RBC AUTO: 90 FL (ref 80–100)
MONOCYTES # BLD AUTO: 0.61 X10*3/UL (ref 0.1–1)
MONOCYTES NFR BLD AUTO: 14.3 %
NEUTROPHILS # BLD AUTO: 2.07 X10*3/UL (ref 1.2–7.7)
NEUTROPHILS NFR BLD AUTO: 48.3 %
NRBC BLD-RTO: 0 /100 WBCS (ref 0–0)
PLATELET # BLD AUTO: 188 X10*3/UL (ref 150–450)
PMV BLD AUTO: 10.7 FL (ref 7.5–11.5)
POTASSIUM SERPL-SCNC: 3.6 MMOL/L (ref 3.5–5.3)
PROT SERPL-MCNC: 7.8 G/DL (ref 6.4–8.2)
PROTHROMBIN TIME: 12.8 SECONDS (ref 9.8–12.8)
RBC # BLD AUTO: 4.93 X10*6/UL (ref 4–5.2)
SODIUM SERPL-SCNC: 138 MMOL/L (ref 136–145)
WBC # BLD AUTO: 4.3 X10*3/UL (ref 4.4–11.3)

## 2023-10-09 PROCEDURE — 99285 EMERGENCY DEPT VISIT HI MDM: CPT | Performed by: STUDENT IN AN ORGANIZED HEALTH CARE EDUCATION/TRAINING PROGRAM

## 2023-10-09 PROCEDURE — 85610 PROTHROMBIN TIME: CPT | Performed by: STUDENT IN AN ORGANIZED HEALTH CARE EDUCATION/TRAINING PROGRAM

## 2023-10-09 PROCEDURE — 84484 ASSAY OF TROPONIN QUANT: CPT | Performed by: STUDENT IN AN ORGANIZED HEALTH CARE EDUCATION/TRAINING PROGRAM

## 2023-10-09 PROCEDURE — 70450 CT HEAD/BRAIN W/O DYE: CPT | Performed by: RADIOLOGY

## 2023-10-09 PROCEDURE — 85025 COMPLETE CBC W/AUTO DIFF WBC: CPT | Performed by: STUDENT IN AN ORGANIZED HEALTH CARE EDUCATION/TRAINING PROGRAM

## 2023-10-09 PROCEDURE — 80053 COMPREHEN METABOLIC PANEL: CPT | Performed by: STUDENT IN AN ORGANIZED HEALTH CARE EDUCATION/TRAINING PROGRAM

## 2023-10-09 PROCEDURE — 2500000001 HC RX 250 WO HCPCS SELF ADMINISTERED DRUGS (ALT 637 FOR MEDICARE OP): Performed by: STUDENT IN AN ORGANIZED HEALTH CARE EDUCATION/TRAINING PROGRAM

## 2023-10-09 PROCEDURE — 36415 COLL VENOUS BLD VENIPUNCTURE: CPT | Performed by: STUDENT IN AN ORGANIZED HEALTH CARE EDUCATION/TRAINING PROGRAM

## 2023-10-09 PROCEDURE — 70450 CT HEAD/BRAIN W/O DYE: CPT

## 2023-10-09 RX ORDER — ASPIRIN 325 MG
325 TABLET ORAL ONCE
Status: COMPLETED | OUTPATIENT
Start: 2023-10-09 | End: 2023-10-09

## 2023-10-09 RX ADMIN — ASPIRIN 325 MG ORAL TABLET 325 MG: 325 PILL ORAL at 23:15

## 2023-10-09 ASSESSMENT — LIFESTYLE VARIABLES
EVER FELT BAD OR GUILTY ABOUT YOUR DRINKING: NO
EVER FELT BAD OR GUILTY ABOUT YOUR DRINKING: NO
HAVE YOU EVER FELT YOU SHOULD CUT DOWN ON YOUR DRINKING: NO
HAVE YOU EVER FELT YOU SHOULD CUT DOWN ON YOUR DRINKING: NO
EVER HAD A DRINK FIRST THING IN THE MORNING TO STEADY YOUR NERVES TO GET RID OF A HANGOVER: NO
HAVE PEOPLE ANNOYED YOU BY CRITICIZING YOUR DRINKING: NO
EVER HAD A DRINK FIRST THING IN THE MORNING TO STEADY YOUR NERVES TO GET RID OF A HANGOVER: NO
HAVE PEOPLE ANNOYED YOU BY CRITICIZING YOUR DRINKING: NO

## 2023-10-09 ASSESSMENT — COLUMBIA-SUICIDE SEVERITY RATING SCALE - C-SSRS
6. HAVE YOU EVER DONE ANYTHING, STARTED TO DO ANYTHING, OR PREPARED TO DO ANYTHING TO END YOUR LIFE?: NO
2. HAVE YOU ACTUALLY HAD ANY THOUGHTS OF KILLING YOURSELF?: NO
1. IN THE PAST MONTH, HAVE YOU WISHED YOU WERE DEAD OR WISHED YOU COULD GO TO SLEEP AND NOT WAKE UP?: NO

## 2023-10-09 ASSESSMENT — PAIN - FUNCTIONAL ASSESSMENT: PAIN_FUNCTIONAL_ASSESSMENT: 0-10

## 2023-10-10 ENCOUNTER — APPOINTMENT (OUTPATIENT)
Dept: RADIOLOGY | Facility: HOSPITAL | Age: 63
DRG: 103 | End: 2023-10-10
Payer: MEDICARE

## 2023-10-10 VITALS
SYSTOLIC BLOOD PRESSURE: 107 MMHG | BODY MASS INDEX: 37.53 KG/M2 | HEART RATE: 67 BPM | TEMPERATURE: 95.9 F | HEIGHT: 62 IN | OXYGEN SATURATION: 93 % | DIASTOLIC BLOOD PRESSURE: 51 MMHG | WEIGHT: 203.93 LBS | RESPIRATION RATE: 18 BRPM

## 2023-10-10 PROBLEM — G43.909 MIGRAINE: Status: ACTIVE | Noted: 2023-10-10

## 2023-10-10 PROBLEM — R20.0 LEFT FACIAL NUMBNESS: Status: ACTIVE | Noted: 2023-10-10

## 2023-10-10 LAB
GLUCOSE BLD MANUAL STRIP-MCNC: 106 MG/DL (ref 74–99)
GLUCOSE BLD MANUAL STRIP-MCNC: 110 MG/DL (ref 74–99)
GLUCOSE BLD MANUAL STRIP-MCNC: 115 MG/DL (ref 74–99)

## 2023-10-10 PROCEDURE — 70551 MRI BRAIN STEM W/O DYE: CPT | Mod: MG

## 2023-10-10 PROCEDURE — 96372 THER/PROPH/DIAG INJ SC/IM: CPT | Performed by: INTERNAL MEDICINE

## 2023-10-10 PROCEDURE — 1210000001 HC SEMI-PRIVATE ROOM DAILY

## 2023-10-10 PROCEDURE — 70551 MRI BRAIN STEM W/O DYE: CPT | Performed by: RADIOLOGY

## 2023-10-10 PROCEDURE — 2500000004 HC RX 250 GENERAL PHARMACY W/ HCPCS (ALT 636 FOR OP/ED): Performed by: INTERNAL MEDICINE

## 2023-10-10 PROCEDURE — G0378 HOSPITAL OBSERVATION PER HR: HCPCS

## 2023-10-10 PROCEDURE — 82947 ASSAY GLUCOSE BLOOD QUANT: CPT

## 2023-10-10 PROCEDURE — 2500000001 HC RX 250 WO HCPCS SELF ADMINISTERED DRUGS (ALT 637 FOR MEDICARE OP): Performed by: INTERNAL MEDICINE

## 2023-10-10 PROCEDURE — 70544 MR ANGIOGRAPHY HEAD W/O DYE: CPT | Performed by: RADIOLOGY

## 2023-10-10 PROCEDURE — 99221 1ST HOSP IP/OBS SF/LOW 40: CPT | Performed by: STUDENT IN AN ORGANIZED HEALTH CARE EDUCATION/TRAINING PROGRAM

## 2023-10-10 PROCEDURE — 2500000004 HC RX 250 GENERAL PHARMACY W/ HCPCS (ALT 636 FOR OP/ED): Performed by: STUDENT IN AN ORGANIZED HEALTH CARE EDUCATION/TRAINING PROGRAM

## 2023-10-10 PROCEDURE — G1004 CDSM NDSC: HCPCS

## 2023-10-10 PROCEDURE — 99222 1ST HOSP IP/OBS MODERATE 55: CPT | Performed by: INTERNAL MEDICINE

## 2023-10-10 RX ORDER — ACETAMINOPHEN 325 MG/1
650 TABLET ORAL EVERY 4 HOURS PRN
COMMUNITY

## 2023-10-10 RX ORDER — ACETAMINOPHEN 160 MG/5ML
650 SOLUTION ORAL EVERY 4 HOURS PRN
Status: DISCONTINUED | OUTPATIENT
Start: 2023-10-10 | End: 2023-10-10 | Stop reason: HOSPADM

## 2023-10-10 RX ORDER — CLOPIDOGREL BISULFATE 75 MG/1
1 TABLET ORAL DAILY
COMMUNITY
Start: 2019-06-17

## 2023-10-10 RX ORDER — HYDROCHLOROTHIAZIDE 12.5 MG/1
12.5 TABLET ORAL DAILY
COMMUNITY
Start: 2022-05-24 | End: 2023-10-20

## 2023-10-10 RX ORDER — AMLODIPINE BESYLATE 5 MG/1
1 TABLET ORAL DAILY
COMMUNITY
Start: 2022-08-11 | End: 2024-04-29 | Stop reason: SDUPTHER

## 2023-10-10 RX ORDER — SPIRONOLACTONE 25 MG/1
25 TABLET ORAL DAILY
COMMUNITY
Start: 2022-05-04 | End: 2023-10-20

## 2023-10-10 RX ORDER — ALBUTEROL SULFATE 90 UG/1
2 AEROSOL, METERED RESPIRATORY (INHALATION) EVERY 6 HOURS PRN
COMMUNITY

## 2023-10-10 RX ORDER — CHOLECALCIFEROL (VITAMIN D3) 25 MCG
2000 TABLET ORAL 2 TIMES DAILY
COMMUNITY
Start: 2011-02-14

## 2023-10-10 RX ORDER — ONDANSETRON HYDROCHLORIDE 2 MG/ML
4 INJECTION, SOLUTION INTRAVENOUS EVERY 8 HOURS PRN
Status: DISCONTINUED | OUTPATIENT
Start: 2023-10-10 | End: 2023-10-10 | Stop reason: HOSPADM

## 2023-10-10 RX ORDER — KETOROLAC TROMETHAMINE 30 MG/ML
15 INJECTION, SOLUTION INTRAMUSCULAR; INTRAVENOUS ONCE
Status: COMPLETED | OUTPATIENT
Start: 2023-10-10 | End: 2023-10-10

## 2023-10-10 RX ORDER — AMOXICILLIN 250 MG
2 CAPSULE ORAL 2 TIMES DAILY
Status: DISCONTINUED | OUTPATIENT
Start: 2023-10-10 | End: 2023-10-10 | Stop reason: HOSPADM

## 2023-10-10 RX ORDER — SERTRALINE HYDROCHLORIDE 100 MG/1
100 TABLET, FILM COATED ORAL DAILY
COMMUNITY
Start: 2022-02-28 | End: 2023-11-06

## 2023-10-10 RX ORDER — LANOLIN ALCOHOL/MO/W.PET/CERES
1 CREAM (GRAM) TOPICAL DAILY
COMMUNITY

## 2023-10-10 RX ORDER — HEPARIN SODIUM 5000 [USP'U]/ML
5000 INJECTION, SOLUTION INTRAVENOUS; SUBCUTANEOUS EVERY 8 HOURS SCHEDULED
Status: DISCONTINUED | OUTPATIENT
Start: 2023-10-10 | End: 2023-10-10 | Stop reason: HOSPADM

## 2023-10-10 RX ORDER — ACETAMINOPHEN 650 MG/1
650 SUPPOSITORY RECTAL EVERY 4 HOURS PRN
Status: DISCONTINUED | OUTPATIENT
Start: 2023-10-10 | End: 2023-10-10 | Stop reason: HOSPADM

## 2023-10-10 RX ORDER — ACETAMINOPHEN 325 MG/1
650 TABLET ORAL EVERY 4 HOURS PRN
Status: DISCONTINUED | OUTPATIENT
Start: 2023-10-10 | End: 2023-10-10 | Stop reason: HOSPADM

## 2023-10-10 RX ORDER — HYDROXYCHLOROQUINE SULFATE 200 MG/1
400 TABLET ORAL DAILY
COMMUNITY
Start: 2022-11-14

## 2023-10-10 RX ORDER — ATORVASTATIN CALCIUM 40 MG/1
1 TABLET, FILM COATED ORAL DAILY
COMMUNITY
End: 2024-04-29 | Stop reason: SDUPTHER

## 2023-10-10 RX ORDER — NITROGLYCERIN 0.4 MG/1
0.4 TABLET SUBLINGUAL EVERY 5 MIN PRN
COMMUNITY
Start: 2019-11-11 | End: 2023-10-25 | Stop reason: SDUPTHER

## 2023-10-10 RX ORDER — FAMOTIDINE 10 MG/ML
20 INJECTION INTRAVENOUS 2 TIMES DAILY
Status: DISCONTINUED | OUTPATIENT
Start: 2023-10-10 | End: 2023-10-10 | Stop reason: HOSPADM

## 2023-10-10 RX ORDER — FAMOTIDINE 20 MG/1
20 TABLET, FILM COATED ORAL 2 TIMES DAILY
Status: DISCONTINUED | OUTPATIENT
Start: 2023-10-10 | End: 2023-10-10 | Stop reason: HOSPADM

## 2023-10-10 RX ORDER — METOCLOPRAMIDE HYDROCHLORIDE 5 MG/ML
10 INJECTION INTRAMUSCULAR; INTRAVENOUS ONCE
Status: COMPLETED | OUTPATIENT
Start: 2023-10-10 | End: 2023-10-10

## 2023-10-10 RX ORDER — DIPHENHYDRAMINE HYDROCHLORIDE 50 MG/ML
25 INJECTION INTRAMUSCULAR; INTRAVENOUS ONCE
Status: COMPLETED | OUTPATIENT
Start: 2023-10-10 | End: 2023-10-10

## 2023-10-10 RX ORDER — FLUTICASONE PROPIONATE 50 MCG
1 SPRAY, SUSPENSION (ML) NASAL DAILY
COMMUNITY
End: 2023-10-25 | Stop reason: WASHOUT

## 2023-10-10 RX ORDER — ONDANSETRON 4 MG/1
4 TABLET, FILM COATED ORAL EVERY 8 HOURS PRN
Status: DISCONTINUED | OUTPATIENT
Start: 2023-10-10 | End: 2023-10-10 | Stop reason: HOSPADM

## 2023-10-10 RX ORDER — ASPIRIN 81 MG/1
1 TABLET ORAL DAILY
COMMUNITY

## 2023-10-10 RX ADMIN — SODIUM CHLORIDE 500 ML: 9 INJECTION, SOLUTION INTRAVENOUS at 12:02

## 2023-10-10 RX ADMIN — METOCLOPRAMIDE HYDROCHLORIDE 10 MG: 5 INJECTION INTRAMUSCULAR; INTRAVENOUS at 12:03

## 2023-10-10 RX ADMIN — DOCUSATE SODIUM 50MG AND SENNOSIDES 8.6MG 2 TABLET: 8.6; 5 TABLET, FILM COATED ORAL at 10:02

## 2023-10-10 RX ADMIN — FAMOTIDINE 20 MG: 20 TABLET, FILM COATED ORAL at 10:02

## 2023-10-10 RX ADMIN — HEPARIN SODIUM 5000 UNITS: 5000 INJECTION INTRAVENOUS; SUBCUTANEOUS at 06:05

## 2023-10-10 RX ADMIN — DIPHENHYDRAMINE HYDROCHLORIDE 25 MG: 50 INJECTION, SOLUTION INTRAMUSCULAR; INTRAVENOUS at 12:02

## 2023-10-10 RX ADMIN — KETOROLAC TROMETHAMINE 15 MG: 30 INJECTION, SOLUTION INTRAMUSCULAR; INTRAVENOUS at 12:02

## 2023-10-10 SDOH — SOCIAL STABILITY: SOCIAL INSECURITY: ABUSE: ADULT

## 2023-10-10 SDOH — HEALTH STABILITY: MENTAL HEALTH: HOW OFTEN DO YOU HAVE A DRINK CONTAINING ALCOHOL?: NEVER

## 2023-10-10 SDOH — HEALTH STABILITY: PHYSICAL HEALTH: ON AVERAGE, HOW MANY DAYS PER WEEK DO YOU ENGAGE IN MODERATE TO STRENUOUS EXERCISE (LIKE A BRISK WALK)?: 2 DAYS

## 2023-10-10 SDOH — HEALTH STABILITY: MENTAL HEALTH: HOW OFTEN DO YOU HAVE 6 OR MORE DRINKS ON ONE OCCASION?: NEVER

## 2023-10-10 SDOH — SOCIAL STABILITY: SOCIAL INSECURITY: DO YOU FEEL ANYONE HAS EXPLOITED OR TAKEN ADVANTAGE OF YOU FINANCIALLY OR OF YOUR PERSONAL PROPERTY?: NO

## 2023-10-10 SDOH — HEALTH STABILITY: PHYSICAL HEALTH: ON AVERAGE, HOW MANY MINUTES DO YOU ENGAGE IN EXERCISE AT THIS LEVEL?: 20 MIN

## 2023-10-10 SDOH — SOCIAL STABILITY: SOCIAL INSECURITY: DO YOU FEEL UNSAFE GOING BACK TO THE PLACE WHERE YOU ARE LIVING?: NO

## 2023-10-10 SDOH — SOCIAL STABILITY: SOCIAL INSECURITY: DOES ANYONE TRY TO KEEP YOU FROM HAVING/CONTACTING OTHER FRIENDS OR DOING THINGS OUTSIDE YOUR HOME?: NO

## 2023-10-10 SDOH — HEALTH STABILITY: MENTAL HEALTH: HOW MANY STANDARD DRINKS CONTAINING ALCOHOL DO YOU HAVE ON A TYPICAL DAY?: PATIENT DOES NOT DRINK

## 2023-10-10 SDOH — SOCIAL STABILITY: SOCIAL INSECURITY: ARE THERE ANY APPARENT SIGNS OF INJURIES/BEHAVIORS THAT COULD BE RELATED TO ABUSE/NEGLECT?: NO

## 2023-10-10 SDOH — SOCIAL STABILITY: SOCIAL INSECURITY: HAS ANYONE EVER THREATENED TO HURT YOUR FAMILY OR YOUR PETS?: NO

## 2023-10-10 SDOH — SOCIAL STABILITY: SOCIAL INSECURITY: ARE YOU OR HAVE YOU BEEN THREATENED OR ABUSED PHYSICALLY, EMOTIONALLY, OR SEXUALLY BY ANYONE?: NO

## 2023-10-10 ASSESSMENT — PAIN SCALES - GENERAL
PAINLEVEL_OUTOF10: 0 - NO PAIN
PAINLEVEL_OUTOF10: 3
PAINLEVEL_OUTOF10: 4
PAINLEVEL_OUTOF10: 0 - NO PAIN

## 2023-10-10 ASSESSMENT — PATIENT HEALTH QUESTIONNAIRE - PHQ9
2. FEELING DOWN, DEPRESSED OR HOPELESS: NOT AT ALL
SUM OF ALL RESPONSES TO PHQ9 QUESTIONS 1 & 2: 0
1. LITTLE INTEREST OR PLEASURE IN DOING THINGS: NOT AT ALL

## 2023-10-10 ASSESSMENT — COGNITIVE AND FUNCTIONAL STATUS - GENERAL
MOBILITY SCORE: 24
DAILY ACTIVITIY SCORE: 24

## 2023-10-10 ASSESSMENT — ACTIVITIES OF DAILY LIVING (ADL)
HEARING - RIGHT EAR: FUNCTIONAL
WALKS IN HOME: INDEPENDENT
JUDGMENT_ADEQUATE_SAFELY_COMPLETE_DAILY_ACTIVITIES: YES
GROOMING: INDEPENDENT
ADEQUATE_TO_COMPLETE_ADL: YES
FEEDING YOURSELF: INDEPENDENT
PATIENT'S MEMORY ADEQUATE TO SAFELY COMPLETE DAILY ACTIVITIES?: YES
BATHING: INDEPENDENT
HEARING - LEFT EAR: FUNCTIONAL
TOILETING: INDEPENDENT
DRESSING YOURSELF: INDEPENDENT
LACK_OF_TRANSPORTATION: NO

## 2023-10-10 ASSESSMENT — PAIN - FUNCTIONAL ASSESSMENT
PAIN_FUNCTIONAL_ASSESSMENT: 0-10
PAIN_FUNCTIONAL_ASSESSMENT: 0-10

## 2023-10-10 ASSESSMENT — LIFESTYLE VARIABLES
SKIP TO QUESTIONS 9-10: 1
AUDIT-C TOTAL SCORE: 0
SUBSTANCE_ABUSE_PAST_12_MONTHS: NO
AUDIT-C TOTAL SCORE: 0
HOW OFTEN DO YOU HAVE 6 OR MORE DRINKS ON ONE OCCASION: NEVER
HOW OFTEN DO YOU HAVE A DRINK CONTAINING ALCOHOL: NEVER
AUDIT-C TOTAL SCORE: 0
SKIP TO QUESTIONS 9-10: 1
PRESCIPTION_ABUSE_PAST_12_MONTHS: NO
HOW MANY STANDARD DRINKS CONTAINING ALCOHOL DO YOU HAVE ON A TYPICAL DAY: PATIENT DOES NOT DRINK

## 2023-10-10 ASSESSMENT — PAIN DESCRIPTION - DESCRIPTORS: DESCRIPTORS: ACHING

## 2023-10-10 NOTE — CONSULTS
History Of Present Illness  Lalita Dumont is a 63 y.o. female presenting with left face numbness.    She has a history of stroke causing left-sided numbness and weakness, including left face numbness. This has mostly recovered.    For the past 3 days she has had URI symptoms of cough with green mucus, runny nose, but no fever or SOB. For 3 days she has had a holocephalic squeezing headache with photophobia, phonophobia, and nausea, associated with dark blurry dots in her vision. Yesterday she developed left facial numbness in the cheek area. Currently the headache is reported as 4/10. She does have a prior hx of similar headaches with similar visual aura.    Past Medical History  Past Medical History:   Diagnosis Date    CHF (congestive heart failure) (CMS/[facility])     Coronary artery disease     Personal history of other diseases of the circulatory system     History of hypertension    Personal history of other diseases of the musculoskeletal system and connective tissue     History of rheumatoid arthritis    Personal history of other diseases of the respiratory system     History of asthma    Vitamin D deficiency, unspecified     Vitamin D deficiency     Surgical History  Past Surgical History:   Procedure Laterality Date    CARPAL TUNNEL RELEASE  09/24/2015    Neuroplasty Decompression Median Nerve At Carpal Tunnel    MR HEAD ANGIO WO IV CONTRAST  3/5/2020    MR HEAD ANGIO WO IV CONTRAST 3/5/2020 [facility]    MR HEAD ANGIO WO IV CONTRAST  10/10/2023    MR HEAD ANGIO WO IV CONTRAST 10/10/2023 [facility]    MR NECK ANGIO WO IV CONTRAST  3/5/2020    MR NECK ANGIO WO IV CONTRAST 3/5/2020 [facility]    OTHER SURGICAL HISTORY  10/23/2021    Dilation and curettage    OTHER SURGICAL HISTORY  10/23/2021    Colonoscopy    OTHER SURGICAL HISTORY  10/23/2021    Esophagogastroduodenoscopy    OTHER SURGICAL HISTORY  10/23/2021    Cardiac catheterization with stent placement    TUBAL LIGATION  09/24/2015    Tubal  "Ligation     Social History  Social History     Tobacco Use    Smoking status: Never    Smokeless tobacco: Never   Vaping Use    Vaping Use: Never used   Substance Use Topics    Alcohol use: Never    Drug use: Never     Allergies  Bactrim [sulfamethoxazole-trimethoprim], Darvocet a500 [propoxyphene n-acetaminophen], Lisinopril, Omeprazole, Penicillins, and Azithromycin  Medications Prior to Admission   Medication Sig Dispense Refill Last Dose    amLODIPine (Norvasc) 5 mg tablet Take 1 tablet (5 mg) by mouth once daily.       cholecalciferol (Vitamin D-3) 25 MCG (1000 UT) tablet Take 1 tablet (25 mcg) by mouth once daily.       clopidogrel (Plavix) 75 mg tablet Take 1 tablet (75 mg) by mouth once daily.       hydroCHLOROthiazide (HYDRODiuril) 12.5 mg tablet Take 1 tablet (12.5 mg) by mouth once daily.       nitroglycerin (Nitrostat) 0.4 mg SL tablet Place 1 tablet (0.4 mg) under the tongue every 5 minutes if needed.       PlaqueniL 200 mg tablet Take 2 tablets (400 mg) by mouth once daily.       sertraline (Zoloft) 100 mg tablet Take 1 tablet (100 mg) by mouth once daily.       spironolactone (Aldactone) 25 mg tablet Take 1 tablet (25 mg) by mouth once daily.       acetaminophen (TylenoL) 325 mg tablet Take 2 tablets (650 mg) by mouth every 4 hours if needed.       albuterol 90 mcg/actuation inhaler Inhale 2 puffs every 6 hours if needed.       aspirin 81 mg EC tablet Take 1 tablet (81 mg) by mouth once daily.       atorvastatin (Lipitor) 40 mg tablet Take 1 tablet (40 mg) by mouth once daily.       cyanocobalamin (Vitamin B-12) 1,000 mcg tablet Take 1 tablet (1,000 mcg) by mouth once daily.       fluticasone (Flonase) 50 mcg/actuation nasal spray Administer 1 spray into each nostril once daily.          Review of Systems  Neurological Exam  Last Recorded Vitals  Blood pressure 140/67, pulse 69, temperature 36.2 °C (97.2 °F), temperature source Temporal, resp. rate 18, height 1.58 m (5' 2.21\"), weight 92.5 kg (203 lb " 14.8 oz), SpO2 94 %.    Relevant Results  NIH Stroke Scale  1A. Level of Consciousness: Alert, Keenly Responsive  1B. Ask Month and Age: Both Questions Right  1C. Blink Eyes & Squeeze Hands: Performs Both Tasks  2. Best Gaze: Normal  3. Visual: No Visual Loss  4. Facial Palsy: Normal Symmetrical Movements  5A. Motor - Left Arm: No Drift  5B. Motor - Right Arm: No Drift  6A. Motor - Left Leg: No Drift  6B. Motor - Right Leg: No Drift  7. Limb Ataxia: Absent  8. Sensory Loss: Normal  9. Best Language: No Aphasia  10. Dysarthria: Normal  11. Extinction and Inattention: No Abnormality  NIH Stroke Scale: 0    Results for orders placed or performed during the hospital encounter of 10/09/23 (from the past 24 hour(s))   Troponin I, High Sensitivity   Result Value Ref Range    Troponin I, High Sensitivity 3 0 - 13 ng/L   Comprehensive metabolic panel   Result Value Ref Range    Glucose 93 74 - 99 mg/dL    Sodium 138 136 - 145 mmol/L    Potassium 3.6 3.5 - 5.3 mmol/L    Chloride 103 98 - 107 mmol/L    Bicarbonate 26 21 - 32 mmol/L    Anion Gap 13 10 - 20 mmol/L    Urea Nitrogen 23 6 - 23 mg/dL    Creatinine 0.90 0.50 - 1.05 mg/dL    eGFR 71 >60 mL/min/1.73m*2    Calcium 9.6 8.6 - 10.3 mg/dL    Albumin 4.2 3.4 - 5.0 g/dL    Alkaline Phosphatase 55 33 - 136 U/L    Total Protein 7.8 6.4 - 8.2 g/dL    AST 24 9 - 39 U/L    Bilirubin, Total 0.6 0.0 - 1.2 mg/dL    ALT 26 7 - 45 U/L   CBC and Auto Differential   Result Value Ref Range    WBC 4.3 (L) 4.4 - 11.3 x10*3/uL    nRBC 0.0 0.0 - 0.0 /100 WBCs    RBC 4.93 4.00 - 5.20 x10*6/uL    Hemoglobin 14.7 12.0 - 16.0 g/dL    Hematocrit 44.2 36.0 - 46.0 %    MCV 90 80 - 100 fL    MCH 29.8 26.0 - 34.0 pg    MCHC 33.3 32.0 - 36.0 g/dL    RDW 12.4 11.5 - 14.5 %    Platelets 188 150 - 450 x10*3/uL    MPV 10.7 7.5 - 11.5 fL    Neutrophils % 48.3 40.0 - 80.0 %    Immature Granulocytes %, Automated 0.5 0.0 - 0.9 %    Lymphocytes % 33.9 13.0 - 44.0 %    Monocytes % 14.3 2.0 - 10.0 %     Eosinophils % 2.3 0.0 - 6.0 %    Basophils % 0.7 0.0 - 2.0 %    Neutrophils Absolute 2.07 1.20 - 7.70 x10*3/uL    Immature Granulocytes Absolute, Automated 0.02 0.00 - 0.70 x10*3/uL    Lymphocytes Absolute 1.45 1.20 - 4.80 x10*3/uL    Monocytes Absolute 0.61 0.10 - 1.00 x10*3/uL    Eosinophils Absolute 0.10 0.00 - 0.70 x10*3/uL    Basophils Absolute 0.03 0.00 - 0.10 x10*3/uL   Protime-INR   Result Value Ref Range    Protime 12.8 9.8 - 12.8 seconds    INR 1.1 0.9 - 1.1   POCT GLUCOSE   Result Value Ref Range    POCT Glucose 106 (H) 74 - 99 mg/dL   POCT GLUCOSE   Result Value Ref Range    POCT Glucose 115 (H) 74 - 99 mg/dL      MR angio head wo IV contrast 10/10/2023  MR brain wo IV contrast 10/10/2023    Narrative  Interpreted By:  Alfredo Bhakta,  STUDY:  MR BRAIN WO IV CONTRAST; MR ANGIO HEAD WO IV CONTRAST    INDICATION:  Signs/Symptoms:CVA vs TIA    COMPARISON:  Head CT 10/09/2023. Brain MRI 03/05/2020, MRA head 03/05/2020    ACCESSION NUMBER(S):  [id number]; [id number]    ORDERING CLINICIAN:  RANDY WATERS    TECHNIQUE:  Multi-planar multi-sequential MR imaging of the brain was performed  without intravenous contrast. MRA of the brain was performed  utilizing 3-D time-of-flight, without intravenous contrast.    FINDINGS:  MRI BRAIN:    No acute infarction, intracranial hemorrhage or mass lesion.    Moderate periventricular and subcortical white matter T2 FLAIR  hyperintensities, nonspecific, but likely representing chronic  microangiopathic changes. This is stable compared to previous MRI  03/05/2020.    No hydrocephalus.  No extra-axial fluid collections. The skull base  flow voids are present.    The visualized intraorbital contents are normal. The imaged portions  of the paranasal sinuses are clear. The mastoid air cells are clear.  The visualized osseous structures, soft tissues and partially  visualized parotid glands appear normal.    MRA HEAD:    Normal distal internal carotid arteries.    The  proximal anterior, middle and posterior cerebral arteries are  patent bilaterally.    Normal vertebrobasilar system. Vertebrobasilar system is slightly  diminutive as bilateral PCAs are largely supplied by the PCOM.    No evidence of vascular stenosis, occlusion, aneurysm or vascular  malformation.    Impression  No acute intracranial abnormality. Moderate microangiopathic disease,  unchanged from previous MRI 03/05/2020.    Intracranial vasculature is within normal limits. No large vessel  occlusion, high-grade stenosis, or aneurysm.    Signed by: Alfredo Bhakta 10/10/2023 11:07 AM  Dictation workstation:   [id number]            Assessment/Plan   Principal Problem:    Migraine           Impression:  Migraine versus recrudescence of old stroke symptoms in setting of URI    Recommend:  Migraine cocktail ordered  OK for discharge    I spent 55 minutes in the professional and overall care of this patient.      Willie Jones MD

## 2023-10-10 NOTE — CARE PLAN
The patient's goals for the shift include      The clinical goals for the shift include no weakness when walking

## 2023-10-10 NOTE — ED PROVIDER NOTES
History/Exam limitations: none.   Additional history was obtained from patient.    HPI:       Lalita Dumont is a 63 y.o. with a history of 2 prior strokes on Plavix presenting with strokelike symptoms through triage.  Patient was taken immediately to CT and evaluated following noncontrast CT of the head.  Patient reports that for the last 2 days she has had cold symptoms including headache and has been taking medications for this.  Over the last few hours has noticed that she was not able to feel anything on the left side of her face.  Denies any other associated symptoms.  States that the last time she noticed she could feel on her face was around 5 PM this evening which was over 4 hours ago.  No changes in speech.  No changes in vision.  No focal weakness.  No numbness in the arms or legs that is new.  Patient does report that she has a residual small area of numbness in both the right and the left leg from prior stroke.    Last Known Well Time: 1700        ------------------------------------------------------------------------------------------------------------------------------------------     Physical Exam:    ED Triage Vitals [10/09/23 2056]   Temp Heart Rate Resp BP   35.8 °C (96.4 °F) 82 18 158/75      SpO2 Temp Source Heart Rate Source Patient Position   95 % Temporal -- Sitting      BP Location FiO2 (%)     Right arm --          Gen: Not in acute distress  Head/Neck: NCAT  Eyes: Anicteric sclerae, noninjected conjunctivae  Nose: No rhinorrhea  Mouth:  MMM  Heart: Regular rate and rhythm w/ no murmurs, rubs, gallops  Lungs: CTAB w/o wheezes, rales, rhonchi, no increased work of breathing  Abdomen: Soft, NT/ND  Musculoskeletal: No deformities  Extremities: No edema.  Neurologic: Please see below for NIHSS  Skin: No rashes noted  Psychological: Calm        Interval: Baseline  Time: 11:07 PM  Person Administering Scale: Mumtaz Man MD     1a  Level of consciousness: 0=alert; keenly responsive   1b.  LOC questions:  0=Performs both tasks correctly   1c. LOC commands: 0=Performs both tasks correctly   2.  Best Gaze: 0=normal   3. Visual: 0=No visual loss   4. Facial Palsy: 0=Normal symmetric movement   5a. Motor left arm: 0=No drift, limb holds 90 (or 45) degrees for full 10 seconds   5b.  Motor right arm: 0=No drift, limb holds 90 (or 45) degrees for full 10 seconds   6a. motor left le=No drift, limb holds 90 (or 45) degrees for full 10 seconds   6b  Motor right le=No drift, limb holds 90 (or 45) degrees for full 10 seconds   7. Limb Ataxia: 0=Absent   8.  Sensory: 1=Mild to moderate sensory loss; patient feels pinprick is less sharp or is dull on the affected side; there is a loss of superficial pain with pinprick but patient is aware He is being touched   9. Best Language:  0=No aphasia, normal   10. Dysarthria: 0=Normal   11. Extinction and Inattention: 0=No abnormality   12. Distal motor function: 0=Normal     Total:   1         VAN: VAN: Negative     ------------------------------------------------------------------------------------------------------------------------------------------     Medical Decision Making:     ED Course as of 10/09/23 2307   Mon Oct 09, 2023   2149 CT head wo IV contrast  CT head without contrast negative for acute findings, no intracranial hemorrhage. [MK]      ED Course User Index  [MK] Mumtaz Man MD         Diagnoses as of 10/09/23 2307   Left facial numbness        EKG interpreted by myself: Twelve-lead ECG obtained at  by my interpretation demonstrates normal sinus rhythm with a rate of 68, left bundle branch block, no acute ischemic changes.     Independent Interpretation of Studies: I independently interpreted the CT head and see No obvious evidence of intracranial hemorrhage     Discussion of Management with Other Providers:   I discussed the patient/results with:  the admitting team    Patient CT without contrast negative for acute hemorrhage.   Patient given  mg.  Plan to admit for MRI.  Given the patient's symptoms are overall mild and not on disabling and she is approaching the 4 and half hour window at the time of CT imaging, do not feel the patient is a candidate for TNK.       IV Thrombolysis:    No Thrombolysis contraindication reason: Neurologic signs are mild and judged to be non-disabling and Time from Last Known Well (or stroke onset) is >4.5 hours     Procedure  Procedures         Mumtaz Man MD  10/09/23 3442

## 2023-10-11 ENCOUNTER — DOCUMENTATION (OUTPATIENT)
Dept: PRIMARY CARE | Facility: CLINIC | Age: 63
End: 2023-10-11
Payer: MEDICARE

## 2023-10-11 ENCOUNTER — PATIENT OUTREACH (OUTPATIENT)
Dept: PRIMARY CARE | Facility: CLINIC | Age: 63
End: 2023-10-11
Payer: MEDICARE

## 2023-10-11 DIAGNOSIS — G43.109 MIGRAINE AURA OCCURRING WITH AND WITHOUT HEADACHE: ICD-10-CM

## 2023-10-11 DIAGNOSIS — R29.90 STROKE-LIKE SYMPTOM: ICD-10-CM

## 2023-10-11 NOTE — PROGRESS NOTES
Discharge Facility:  [facility]    Discharge Diagnosis:   Migraine   Stroke like symptoms    Admission Date:10/9/23  Discharge Date: 10/10/23    PCP Appointment Date:  Primary Care Medicare Initial with Victoria Shelton MD Wednesday Oct 25, 2023 10:30 AM  Updated note line to add hospital visit also     Specialist Appointment Date:   Cardiology Established Patient Visit with Tae Blanchard MD Thursday May 16, 2024 10:15 AM     Hospital Encounter and Summary: Linked     See discharge assessment below for further details  I introduced myself and the TCM program to Lalita Dumont. I gave my contact information for any further questions.  Engagement  Call Start Time: 1120 (10/11/2023 11:25 AM)    Medications  Medications reviewed with patient/caregiver?: Not applicable (10/11/2023 11:25 AM)  Prescription Comments: No med changes (10/11/2023 11:25 AM)    Appointments  Does the patient have a primary care provider?: Yes (Victoria Shelton MD) (10/11/2023 11:25 AM)  Care Management Interventions: (S) Verified appointment date/time/provider (10/25/23 --recommend moving up but patient said she would like to keep this appt) (10/11/2023 11:25 AM)  Has the patient kept scheduled appointments due by today?: Yes (10/11/2023 11:25 AM)    Self Management  Has home health visited the patient within 72 hours of discharge?: Not applicable (10/11/2023 11:25 AM)    Patient Teaching  Does the patient have access to their discharge instructions?: Yes (10/11/2023 11:25 AM)  Care Management Interventions: Reviewed instructions with patient (10/11/2023 11:25 AM)  What is the patient's perception of their health status since discharge?: Improving (10/11/2023 11:25 AM)  Is the patient/caregiver able to teach back the hierarchy of who to call/visit for symptoms/problems? PCP, Specialist, Home Health nurse, Urgent Care, ED, 911: Yes (10/11/2023 11:25 AM)    Wrap Up  Call End Time: 1129  (10/11/2023 11:25 AM)

## 2023-10-19 DIAGNOSIS — I10 HYPERTENSION, UNSPECIFIED TYPE: ICD-10-CM

## 2023-10-20 PROBLEM — I25.10 CAD S/P PERCUTANEOUS CORONARY ANGIOPLASTY: Status: ACTIVE | Noted: 2023-10-20

## 2023-10-20 PROBLEM — K64.9 HEMORRHOIDS: Status: ACTIVE | Noted: 2023-10-20

## 2023-10-20 PROBLEM — K62.5 RECTAL BLEEDING: Status: ACTIVE | Noted: 2023-10-20

## 2023-10-20 PROBLEM — R53.83 FATIGUE: Status: ACTIVE | Noted: 2023-10-20

## 2023-10-20 PROBLEM — M19.90 OSTEOARTHRITIS: Status: ACTIVE | Noted: 2023-10-20

## 2023-10-20 PROBLEM — M47.816 OSTEOARTHRITIS OF LUMBAR SPINE: Status: ACTIVE | Noted: 2023-10-20

## 2023-10-20 PROBLEM — R51.9 HEADACHE: Status: ACTIVE | Noted: 2023-10-20

## 2023-10-20 PROBLEM — M54.50 CHRONIC LOW BACK PAIN: Status: ACTIVE | Noted: 2023-10-20

## 2023-10-20 PROBLEM — E11.65 TYPE 2 DIABETES MELLITUS WITH HYPERGLYCEMIA (MULTI): Status: ACTIVE | Noted: 2023-10-20

## 2023-10-20 PROBLEM — E55.9 VITAMIN D DEFICIENCY: Status: ACTIVE | Noted: 2021-11-21

## 2023-10-20 PROBLEM — K21.9 GERD (GASTROESOPHAGEAL REFLUX DISEASE): Status: ACTIVE | Noted: 2023-10-20

## 2023-10-20 PROBLEM — M06.9 RHEUMATOID ARTHRITIS (MULTI): Status: ACTIVE | Noted: 2023-10-20

## 2023-10-20 PROBLEM — N95.0 POSTMENOPAUSAL BLEEDING: Status: ACTIVE | Noted: 2023-10-20

## 2023-10-20 PROBLEM — R35.0 URINARY FREQUENCY: Status: ACTIVE | Noted: 2023-10-20

## 2023-10-20 PROBLEM — N63.0 BREAST LUMP: Status: ACTIVE | Noted: 2023-10-20

## 2023-10-20 PROBLEM — E66.812 CLASS 2 OBESITY WITH BODY MASS INDEX (BMI) OF 37.0 TO 37.9 IN ADULT: Status: ACTIVE | Noted: 2023-10-20

## 2023-10-20 PROBLEM — R87.810 ASCUS WITH POSITIVE HIGH RISK HPV CERVICAL: Status: ACTIVE | Noted: 2023-10-20

## 2023-10-20 PROBLEM — R42 DIZZINESS OF UNKNOWN CAUSE: Status: ACTIVE | Noted: 2023-10-20

## 2023-10-20 PROBLEM — N39.0 LOWER URINARY TRACT INFECTIOUS DISEASE: Status: ACTIVE | Noted: 2023-10-20

## 2023-10-20 PROBLEM — M79.7 FIBROMYALGIA: Status: ACTIVE | Noted: 2023-10-20

## 2023-10-20 PROBLEM — M79.2 ATYPICAL NEURALGIA: Status: ACTIVE | Noted: 2023-10-20

## 2023-10-20 PROBLEM — R25.1 TREMORS OF NERVOUS SYSTEM: Status: ACTIVE | Noted: 2023-10-20

## 2023-10-20 PROBLEM — R04.0 LEFT-SIDED EPISTAXIS: Status: ACTIVE | Noted: 2023-10-20

## 2023-10-20 PROBLEM — H65.93 BILATERAL OTITIS MEDIA WITH EFFUSION: Status: ACTIVE | Noted: 2023-10-20

## 2023-10-20 PROBLEM — R21 SKIN RASH: Status: ACTIVE | Noted: 2023-10-20

## 2023-10-20 PROBLEM — E66.9 CLASS 2 OBESITY WITH BODY MASS INDEX (BMI) OF 37.0 TO 37.9 IN ADULT: Status: ACTIVE | Noted: 2023-10-20

## 2023-10-20 PROBLEM — R07.9 CHEST PAIN: Status: ACTIVE | Noted: 2019-11-10

## 2023-10-20 PROBLEM — R26.0 ATAXIC GAIT: Status: ACTIVE | Noted: 2023-10-20

## 2023-10-20 PROBLEM — I10 HYPERTENSION: Status: ACTIVE | Noted: 2023-10-20

## 2023-10-20 PROBLEM — J45.909 ASTHMA (HHS-HCC): Status: ACTIVE | Noted: 2023-10-20

## 2023-10-20 PROBLEM — R35.1 NOCTURIA: Status: ACTIVE | Noted: 2023-10-20

## 2023-10-20 PROBLEM — F32.A DEPRESSION: Status: ACTIVE | Noted: 2023-10-20

## 2023-10-20 PROBLEM — I63.9 CVA (CEREBRAL VASCULAR ACCIDENT) (MULTI): Status: ACTIVE | Noted: 2023-10-20

## 2023-10-20 PROBLEM — R93.89 ABNORMAL CT OF THE CHEST: Status: ACTIVE | Noted: 2023-10-20

## 2023-10-20 PROBLEM — E03.9 HYPOTHYROIDISM: Status: ACTIVE | Noted: 2023-10-20

## 2023-10-20 PROBLEM — E78.2 MIXED HYPERLIPIDEMIA: Status: ACTIVE | Noted: 2023-10-20

## 2023-10-20 PROBLEM — J20.9 BRONCHITIS, ACUTE: Status: ACTIVE | Noted: 2023-10-20

## 2023-10-20 PROBLEM — M32.9 LUPUS (MULTI): Status: ACTIVE | Noted: 2023-10-20

## 2023-10-20 PROBLEM — G89.29 CHRONIC LOW BACK PAIN: Status: ACTIVE | Noted: 2023-10-20

## 2023-10-20 PROBLEM — B37.2 YEAST DERMATITIS: Status: ACTIVE | Noted: 2023-10-20

## 2023-10-20 PROBLEM — K44.9 HIATAL HERNIA: Status: ACTIVE | Noted: 2023-10-20

## 2023-10-20 PROBLEM — R94.30 ABNORMAL RESULTS OF CARDIOVASCULAR FUNCTION STUDIES: Status: ACTIVE | Noted: 2023-10-20

## 2023-10-20 PROBLEM — R79.89 ABNORMAL LFTS (LIVER FUNCTION TESTS): Status: ACTIVE | Noted: 2023-10-20

## 2023-10-20 PROBLEM — Z98.61 CAD S/P PERCUTANEOUS CORONARY ANGIOPLASTY: Status: ACTIVE | Noted: 2023-10-20

## 2023-10-20 PROBLEM — J06.9 URI, ACUTE: Status: ACTIVE | Noted: 2023-10-20

## 2023-10-20 PROBLEM — E53.9 VITAMIN B DEFICIENCY: Status: ACTIVE | Noted: 2023-10-20

## 2023-10-20 PROBLEM — I67.9 SMALL VESSEL DISEASE, CEREBROVASCULAR: Status: ACTIVE | Noted: 2023-10-20

## 2023-10-20 PROBLEM — J01.90 ACUTE SINUSITIS: Status: ACTIVE | Noted: 2023-10-20

## 2023-10-20 PROBLEM — R76.8 ANA POSITIVE: Status: ACTIVE | Noted: 2021-11-21

## 2023-10-20 PROBLEM — R87.610 ASCUS WITH POSITIVE HIGH RISK HPV CERVICAL: Status: ACTIVE | Noted: 2023-10-20

## 2023-10-20 RX ORDER — BLOOD SUGAR DIAGNOSTIC
STRIP MISCELLANEOUS
COMMUNITY

## 2023-10-20 RX ORDER — MECLIZINE HYDROCHLORIDE 25 MG/1
25 TABLET ORAL 3 TIMES DAILY PRN
COMMUNITY
Start: 2023-07-15 | End: 2023-10-25 | Stop reason: WASHOUT

## 2023-10-20 RX ORDER — HYDROCHLOROTHIAZIDE 12.5 MG/1
12.5 TABLET ORAL DAILY
Qty: 90 TABLET | Refills: 1 | Status: SHIPPED | OUTPATIENT
Start: 2023-10-20 | End: 2024-04-08

## 2023-10-20 RX ORDER — IPRATROPIUM BROMIDE AND ALBUTEROL SULFATE 2.5; .5 MG/3ML; MG/3ML
3 SOLUTION RESPIRATORY (INHALATION) EVERY 4 HOURS PRN
COMMUNITY

## 2023-10-20 RX ORDER — HYDROCORTISONE 25 MG/G
1 CREAM TOPICAL DAILY
COMMUNITY
End: 2023-10-25 | Stop reason: WASHOUT

## 2023-10-20 RX ORDER — SPIRONOLACTONE 25 MG/1
25 TABLET ORAL DAILY
Qty: 90 TABLET | Refills: 1 | Status: SHIPPED | OUTPATIENT
Start: 2023-10-20 | End: 2024-04-08

## 2023-10-25 ENCOUNTER — OFFICE VISIT (OUTPATIENT)
Dept: PRIMARY CARE | Facility: CLINIC | Age: 63
End: 2023-10-25
Payer: MEDICARE

## 2023-10-25 VITALS
WEIGHT: 203.4 LBS | TEMPERATURE: 97.2 F | OXYGEN SATURATION: 93 % | SYSTOLIC BLOOD PRESSURE: 122 MMHG | HEIGHT: 62 IN | HEART RATE: 71 BPM | DIASTOLIC BLOOD PRESSURE: 72 MMHG | BODY MASS INDEX: 37.43 KG/M2

## 2023-10-25 DIAGNOSIS — I10 PRIMARY HYPERTENSION: ICD-10-CM

## 2023-10-25 DIAGNOSIS — E78.2 MIXED HYPERLIPIDEMIA: ICD-10-CM

## 2023-10-25 DIAGNOSIS — G43.109 MIGRAINE WITH AURA AND WITHOUT STATUS MIGRAINOSUS, NOT INTRACTABLE: ICD-10-CM

## 2023-10-25 DIAGNOSIS — Z98.61 CAD S/P PERCUTANEOUS CORONARY ANGIOPLASTY: ICD-10-CM

## 2023-10-25 DIAGNOSIS — E55.9 VITAMIN D DEFICIENCY: ICD-10-CM

## 2023-10-25 DIAGNOSIS — Z00.00 INITIAL MEDICARE ANNUAL WELLNESS VISIT: Primary | ICD-10-CM

## 2023-10-25 DIAGNOSIS — I25.10 CAD S/P PERCUTANEOUS CORONARY ANGIOPLASTY: ICD-10-CM

## 2023-10-25 DIAGNOSIS — E03.9 ACQUIRED HYPOTHYROIDISM: ICD-10-CM

## 2023-10-25 DIAGNOSIS — E11.65 TYPE 2 DIABETES MELLITUS WITH HYPERGLYCEMIA, UNSPECIFIED WHETHER LONG TERM INSULIN USE (MULTI): ICD-10-CM

## 2023-10-25 PROCEDURE — 1125F AMNT PAIN NOTED PAIN PRSNT: CPT | Performed by: FAMILY MEDICINE

## 2023-10-25 PROCEDURE — 1159F MED LIST DOCD IN RCRD: CPT | Performed by: FAMILY MEDICINE

## 2023-10-25 PROCEDURE — 3078F DIAST BP <80 MM HG: CPT | Performed by: FAMILY MEDICINE

## 2023-10-25 PROCEDURE — 3044F HG A1C LEVEL LT 7.0%: CPT | Performed by: FAMILY MEDICINE

## 2023-10-25 PROCEDURE — 99214 OFFICE O/P EST MOD 30 MIN: CPT | Performed by: FAMILY MEDICINE

## 2023-10-25 PROCEDURE — 1036F TOBACCO NON-USER: CPT | Performed by: FAMILY MEDICINE

## 2023-10-25 PROCEDURE — G0438 PPPS, INITIAL VISIT: HCPCS | Performed by: FAMILY MEDICINE

## 2023-10-25 PROCEDURE — 3074F SYST BP LT 130 MM HG: CPT | Performed by: FAMILY MEDICINE

## 2023-10-25 PROCEDURE — 1111F DSCHRG MED/CURRENT MED MERGE: CPT | Performed by: FAMILY MEDICINE

## 2023-10-25 PROCEDURE — 3062F POS MACROALBUMINURIA REV: CPT | Performed by: FAMILY MEDICINE

## 2023-10-25 RX ORDER — NITROGLYCERIN 0.4 MG/1
0.4 TABLET SUBLINGUAL EVERY 5 MIN PRN
Qty: 90 TABLET | Refills: 0 | Status: SHIPPED | OUTPATIENT
Start: 2023-10-25

## 2023-10-25 ASSESSMENT — PATIENT HEALTH QUESTIONNAIRE - PHQ9
2. FEELING DOWN, DEPRESSED OR HOPELESS: NOT AT ALL
SUM OF ALL RESPONSES TO PHQ9 QUESTIONS 1 AND 2: 0
1. LITTLE INTEREST OR PLEASURE IN DOING THINGS: NOT AT ALL

## 2023-10-25 ASSESSMENT — ENCOUNTER SYMPTOMS
DEPRESSION: 0
OCCASIONAL FEELINGS OF UNSTEADINESS: 0
LOSS OF SENSATION IN FEET: 1

## 2023-10-25 NOTE — PROGRESS NOTES
"Subjective :  Chief Complaint: Lalita Dumont is an 63 y.o. female here for an annual wellness visit and general medical care and f/u.     HPI:  Here for her initial Medicare annual wellness visit.    Ophthalmology and dental appointments need to be scheduled.  She states she cannot afford all the copays.  She sees her pulmonologist, cardiologist and neurologist.  She is up to date with her heart and neuro appointments.  Has a CPAP which helps her apnea.  She had severe symptoms before started the PAP.  Sleep is much better with her CPAP. Her fitbit tells her her sleep is good.    Diet - trying to eat healthy.  Changed to 100% whole wheat bread.  Limits the white flour.    Exercise - trying to stay active.    Mood is good.    Had been at [facility] July 5 for a closed head injury from a fall.  No brain bleed.  July 15-16 was at [facility] for dizziness.  BP medication adjusted.    Review of Systems  All systems reviewed and negative except for what was mentioned in the HPI    Objective   /72 (BP Location: Right arm, Patient Position: Sitting, BP Cuff Size: Large adult)   Pulse 71   Temp 36.2 °C (97.2 °F) (Temporal)   Ht 1.575 m (5' 2\")   Wt 92.3 kg (203 lb 6.4 oz)   SpO2 93% Comment: RA  BMI 37.20 kg/m²     Physical Exam  General:  Alert, oriented, no acute distress  Eyes:  Sclerae white, PER, conjunctivae clear  ENT:  No nasal congestion.  Edentulous.    Neck: Supple  Endocrine:  No thyromegaly. No thyroid nodes.   Respiratory:  Normal breath sounds.  No wheezing, rhonchi nor crackles.  No dyspnea.  Cardiovascular:  S1 and S2 positive.  Regular rate and rhythm.  No gallops.  No murmurs.  Vascular:  No edema.  Skin warm and dry.  CNS:  No gross neurological deficits.  Gait within normal limits.    Psychiatric:  Affect is positive and appropriate.  No depression.  No anxiety.     Imaging:  MR angio head wo IV contrast    Result Date: 10/10/2023  Interpreted By:  Alfredo Bhakta, STUDY: MR BRAIN WO IV " CONTRAST; MR ANGIO HEAD WO IV CONTRAST   INDICATION: Signs/Symptoms:CVA vs TIA   COMPARISON: Head CT 10/09/2023. Brain MRI 03/05/2020, MRA head 03/05/2020   ACCESSION NUMBER(S): [id number]; [id number]   ORDERING CLINICIAN: RANDY WATESR   TECHNIQUE: Multi-planar multi-sequential MR imaging of the brain was performed without intravenous contrast. MRA of the brain was performed utilizing 3-D time-of-flight, without intravenous contrast.   FINDINGS: MRI BRAIN:   No acute infarction, intracranial hemorrhage or mass lesion.   Moderate periventricular and subcortical white matter T2 FLAIR hyperintensities, nonspecific, but likely representing chronic microangiopathic changes. This is stable compared to previous MRI 03/05/2020.   No hydrocephalus.  No extra-axial fluid collections. The skull base flow voids are present.   The visualized intraorbital contents are normal. The imaged portions of the paranasal sinuses are clear. The mastoid air cells are clear. The visualized osseous structures, soft tissues and partially visualized parotid glands appear normal.   MRA HEAD:   Normal distal internal carotid arteries.   The proximal anterior, middle and posterior cerebral arteries are patent bilaterally.   Normal vertebrobasilar system. Vertebrobasilar system is slightly diminutive as bilateral PCAs are largely supplied by the PCOM.   No evidence of vascular stenosis, occlusion, aneurysm or vascular malformation.       No acute intracranial abnormality. Moderate microangiopathic disease, unchanged from previous MRI 03/05/2020.   Intracranial vasculature is within normal limits. No large vessel occlusion, high-grade stenosis, or aneurysm.   Signed by: Alfredo Bhakta 10/10/2023 11:07 AM Dictation workstation:   [id number]    MR brain wo IV contrast    Result Date: 10/10/2023  Interpreted By:  Alfredo Bhakta, STUDY: MR BRAIN WO IV CONTRAST; MR ANGIO HEAD WO IV CONTRAST   INDICATION: Signs/Symptoms:CVA vs TIA   COMPARISON: Head  CT 10/09/2023. Brain MRI 03/05/2020, MRA head 03/05/2020   ACCESSION NUMBER(S): [id number]; [id number]   ORDERING CLINICIAN: RANDY WATERS   TECHNIQUE: Multi-planar multi-sequential MR imaging of the brain was performed without intravenous contrast. MRA of the brain was performed utilizing 3-D time-of-flight, without intravenous contrast.   FINDINGS: MRI BRAIN:   No acute infarction, intracranial hemorrhage or mass lesion.   Moderate periventricular and subcortical white matter T2 FLAIR hyperintensities, nonspecific, but likely representing chronic microangiopathic changes. This is stable compared to previous MRI 03/05/2020.   No hydrocephalus.  No extra-axial fluid collections. The skull base flow voids are present.   The visualized intraorbital contents are normal. The imaged portions of the paranasal sinuses are clear. The mastoid air cells are clear. The visualized osseous structures, soft tissues and partially visualized parotid glands appear normal.   MRA HEAD:   Normal distal internal carotid arteries.   The proximal anterior, middle and posterior cerebral arteries are patent bilaterally.   Normal vertebrobasilar system. Vertebrobasilar system is slightly diminutive as bilateral PCAs are largely supplied by the PCOM.   No evidence of vascular stenosis, occlusion, aneurysm or vascular malformation.       No acute intracranial abnormality. Moderate microangiopathic disease, unchanged from previous MRI 03/05/2020.   Intracranial vasculature is within normal limits. No large vessel occlusion, high-grade stenosis, or aneurysm.   Signed by: Alfredo Bhakta 10/10/2023 11:07 AM Dictation workstation:   [id number]    CT head wo IV contrast    Result Date: 10/9/2023  Interpreted By:  Yury Knight, STUDY: CT HEAD WO IV CONTRAST;  10/9/2023 9:05 pm   INDICATION: Signs/Symptoms:stroke like symptoms.   COMPARISON: 09/19/2021   ACCESSION NUMBER(S): [id number]   ORDERING CLINICIAN: JOEY HER   TECHNIQUE: CT  axial images through the Brain were obtained without contrast.   FINDINGS: There is no mass effect, hemorrhage, or infarct. The ventricles appear normal. Gray-white differentiation is maintained.. Senescent changes The visualized paranasal sinuses appear clear. Punctate calcification noted along the lateral aspect of the 4th ventricle again seen. If persistent concern, consider MRI       No acute intracranial abnormality. Senescent changes. If persistent concern, consider MRI   MACRO: None.   Signed by: Yury Knight 10/9/2023 9:39 PM Dictation workstation:   [id number]       Labs reviewed:    Lab Results   Component Value Date    WBC 4.3 (L) 10/09/2023    RBC 4.93 10/09/2023    HGB 14.7 10/09/2023    HCT 44.2 10/09/2023    MCV 90 10/09/2023     10/09/2023    CHOL 150 04/25/2023    TRIG 152 (H) 04/25/2023    HDL 46.3 04/25/2023    ALT 26 10/09/2023    AST 24 10/09/2023     10/09/2023    K 3.6 10/09/2023     10/09/2023    CREATININE 0.90 10/09/2023    BUN 23 10/09/2023    CO2 26 10/09/2023    TSH 3.27 04/25/2023    INR 1.1 10/09/2023    HGBA1C 6.6 (H) 07/14/2023       Past Medical, Surgical, and Family History reviewed and updated in chart.    I have reviewed and reconciled the medication list with the patient today.   Current Outpatient Medications:     acetaminophen (TylenoL) 325 mg tablet, Take 2 tablets (650 mg) by mouth every 4 hours if needed., Disp: , Rfl:     albuterol 90 mcg/actuation inhaler, Inhale 2 puffs every 6 hours if needed., Disp: , Rfl:     amLODIPine (Norvasc) 5 mg tablet, Take 1 tablet (5 mg) by mouth once daily., Disp: , Rfl:     aspirin 81 mg EC tablet, Take 1 tablet (81 mg) by mouth once daily., Disp: , Rfl:     atorvastatin (Lipitor) 40 mg tablet, Take 1 tablet (40 mg) by mouth once daily., Disp: , Rfl:     blood sugar diagnostic (Contour Next Test Strips) strip, once daily., Disp: , Rfl:     cholecalciferol (Vitamin D-3) 25 MCG (1000 UT) tablet, Take 2 tablets (2,000 Units)  by mouth 2 times a day., Disp: , Rfl:     clopidogrel (Plavix) 75 mg tablet, Take 1 tablet (75 mg) by mouth once daily., Disp: , Rfl:     cyanocobalamin (Vitamin B-12) 1,000 mcg tablet, Take 1 tablet (1,000 mcg) by mouth once daily., Disp: , Rfl:     hydroCHLOROthiazide (HYDRODiuril) 12.5 mg tablet, take 1 tablet by mouth once daily, Disp: 90 tablet, Rfl: 1    ipratropium-albuteroL (Duo-Neb) 0.5-2.5 mg/3 mL nebulizer solution, Take 3 mL by nebulization every 4 hours if needed for wheezing., Disp: , Rfl:     PlaqueniL 200 mg tablet, Take 2 tablets (400 mg) by mouth once daily., Disp: , Rfl:     sertraline (Zoloft) 100 mg tablet, Take 1 tablet (100 mg) by mouth once daily., Disp: , Rfl:     spironolactone (Aldactone) 25 mg tablet, take 1 tablet by mouth once daily, Disp: 90 tablet, Rfl: 1    nitroglycerin (Nitrostat) 0.4 mg SL tablet, Place 1 tablet (0.4 mg) under the tongue every 5 minutes if needed for chest pain., Disp: 90 tablet, Rfl: 0     List of current healthcare providers:  Patient Care Team:  Victoria Shelton MD as PCP - General (Family Medicine)  Vicotria Shelton MD as PCP - [employer] PCP     Assessment/Plan :  Problem List Items Addressed This Visit       CAD S/P percutaneous coronary angioplasty    Relevant Medications    nitroglycerin (Nitrostat) 0.4 mg SL tablet    Hypertension     Controlled.  Continue present management.         Relevant Orders    CBC and Auto Differential    Hypothyroidism     Will recheck labs in April 2023.         Relevant Orders    TSH with reflex to Free T4 if abnormal    Migraine     No migraines since her last episode.         Mixed hyperlipidemia     Next full labs due in 6 months.          Relevant Orders    Lipid Panel    Type 2 diabetes mellitus with hyperglycemia (CMS/[facility])     Due for A1c now.  Order to have it rechecked on chart.          Relevant Orders    Comprehensive Metabolic Panel    Albumin , Urine Random    Hemoglobin A1C     Hemoglobin A1C    Vitamin D deficiency     Will check on next lab order.         Relevant Orders    Vitamin D 25-Hydroxy,Total (for eval of Vitamin D levels)     Other Visit Diagnoses       Initial Medicare annual wellness visit    -  Primary    Medicare annual wellness visit, subsequent              The following health maintenance schedule was reviewed with the patient and provided in printed form in the after visit summary:  Health Maintenance   Topic Date Due    Medicare Initial Physical (IPPE)  Never done    MMR Vaccines (1 of 1 - Standard series) Never done    Diabetes: Foot Exam  Never done    Diabetes: Retinopathy Screening  Never done    Hepatitis C Screening  Never done    COVID-19 Vaccine (3 - Moderna series) 10/28/2021    Pneumococcal Vaccine: 65+ Years (2 - PCV) 09/20/2022    Zoster Vaccines (2 of 2) 06/22/2023    Influenza Vaccine (1) 09/01/2023    Diabetes: Hemoglobin A1C  10/14/2023    Lipid Panel  04/25/2024    Diabetes: Urine Protein Screening  04/25/2024    Mammogram  05/01/2024    Bone Density Scan  05/02/2024    TSH Level  07/14/2024    DTaP/Tdap/Td Vaccines (2 - Td or Tdap) 12/16/2025    Colorectal Cancer Screening  07/05/2032    HIB Vaccines  Aged Out    Hepatitis B Vaccines  Aged Out    IPV Vaccines  Aged Out    Hepatitis A Vaccines  Aged Out    Meningococcal Vaccine  Aged Out    Rotavirus Vaccines  Aged Out    HPV Vaccines  Aged Out    Irritable Bowel Syndrome  Discontinued       Advance Care Planning   No POA or living will.        Reviewed lab/testing results with the patient and provided patient education regarding the results.  Continue current medications as listed  Follow up in 6 months after April labs are done.

## 2023-10-26 ENCOUNTER — PATIENT OUTREACH (OUTPATIENT)
Dept: PRIMARY CARE | Facility: CLINIC | Age: 63
End: 2023-10-26

## 2023-10-26 ENCOUNTER — LAB (OUTPATIENT)
Dept: LAB | Facility: LAB | Age: 63
End: 2023-10-26
Payer: MEDICARE

## 2023-10-26 DIAGNOSIS — E11.65 TYPE 2 DIABETES MELLITUS WITH HYPERGLYCEMIA, UNSPECIFIED WHETHER LONG TERM INSULIN USE (MULTI): ICD-10-CM

## 2023-10-26 LAB
CREAT UR-MCNC: 40.6 MG/DL (ref 20–320)
EST. AVERAGE GLUCOSE BLD GHB EST-MCNC: 143 MG/DL
HBA1C MFR BLD: 6.6 %
MICROALBUMIN UR-MCNC: <7 MG/L
MICROALBUMIN/CREAT UR: NORMAL MG/G{CREAT}

## 2023-10-26 PROCEDURE — 82570 ASSAY OF URINE CREATININE: CPT

## 2023-10-26 PROCEDURE — 82043 UR ALBUMIN QUANTITATIVE: CPT

## 2023-10-26 PROCEDURE — 36415 COLL VENOUS BLD VENIPUNCTURE: CPT

## 2023-10-26 PROCEDURE — 83036 HEMOGLOBIN GLYCOSYLATED A1C: CPT

## 2023-10-26 NOTE — PROGRESS NOTES
Call regarding appt. with PCP on 10/25/23 after hospitalization.  At time of outreach call the patient feels as if their condition has returned to baseline since last visit.  Reviewed the PCP appointment with the pt and addressed any questions or concerns.

## 2023-10-27 PROBLEM — Z00.00 MEDICARE ANNUAL WELLNESS VISIT, SUBSEQUENT: Status: ACTIVE | Noted: 2023-10-27

## 2023-11-20 NOTE — H&P
CHIEF COMPLAINT:  Strokelike symptoms       HISTORY OF PRESENT ILLNESS.:   Lalita Dumont is a 63 y.o. female presenting with strokelike symptoms.  Patient was feeling uncomfortable URI-like symptoms for about 1-1/2 days prior to becoming emergency department.  She noticed left-sided weakness of the face.  She did not have drooling.  She did not have slurred speech.  Upon arrival in the emergency department she was taken to the noncontrast CT scan which did not show any infarction hemorrhage or cerebral mass.  In the emergency department her NIH score was 0.  She did not have any focal weakness.  Patient was sent to the floor for further evaluation and to rule out acute CVA.     PCP: Victoria Shelton MD      REVIEW OF SYSTEMS:  Denies fever or chills.  No dizziness, syncope, or seizures.  No headaches. No chest pain, chest tightness. No shortness of breath.  No nausea, vomiting, or diarrhea.  No rash or abnormal bleeding.  Denies ankle swelling, muscle aches.  No depression or anxiety symptoms.  Remainder of review of systems is negative and also as per HPI.     PAST MEDICAL AND SURGICAL HISTORY:     Past Medical History:   Diagnosis Date    CHF (congestive heart failure) (CMS/HCC)     Coronary artery disease     Personal history of other diseases of the circulatory system     History of hypertension    Personal history of other diseases of the musculoskeletal system and connective tissue     History of rheumatoid arthritis    Personal history of other diseases of the respiratory system     History of asthma    Vitamin D deficiency, unspecified     Vitamin D deficiency      Past Surgical History:   Procedure Laterality Date    CARPAL TUNNEL RELEASE  09/24/2015    Neuroplasty Decompression Median Nerve At Carpal Tunnel    MR HEAD ANGIO WO IV CONTRAST  3/5/2020    MR HEAD ANGIO WO IV CONTRAST 3/5/2020 [facility] CLINICAL LEGACY    MR HEAD ANGIO WO IV CONTRAST  10/10/2023    MR HEAD ANGIO WO IV CONTRAST 10/10/2023  [facility]    MR NECK ANGIO WO IV CONTRAST  3/5/2020    MR NECK ANGIO WO IV CONTRAST 3/5/2020 [facility] CLINICAL LEGACY    OTHER SURGICAL HISTORY  10/23/2021    Dilation and curettage    OTHER SURGICAL HISTORY  10/23/2021    Colonoscopy    OTHER SURGICAL HISTORY  10/23/2021    Esophagogastroduodenoscopy    OTHER SURGICAL HISTORY  10/23/2021    Cardiac catheterization with stent placement    TUBAL LIGATION  09/24/2015    Tubal Ligation         SOCIAL HISTORY:  Social History     Tobacco Use    Smoking status: Former     Types: Cigarettes    Smokeless tobacco: Never   Vaping Use    Vaping Use: Never used   Substance Use Topics    Alcohol use: Never    Drug use: Never        VITALS:  Vitals:    10/10/23 1517   BP: 107/51   Pulse: 67   Resp:    Temp: 35.5 °C (95.9 °F)   SpO2: 93%        PHYSICAL EXAM:  HEENT:  Atraumatic, PERRL.  Conjunctivae clear.   Moist nasal mucous membranes. oropharynx non erythematous,   Neck:  Supple without thyromegaly or lymphadenopathy.  Lungs:  Clear to auscultation without rales, rhonchi, or rub.  Heart:  RRR, S1, S2, without M.  Abdomen:  Soft, non tender, no organ enlargement, bruit. Bowel sounds present . No CVA tenderness.  Extremities:  No edema. No calf swelling or tenderness.    Skin:  No rash, ecchymosis or erythema.    IMAGING:  MR angio head wo IV contrast  Result Date: 10/10/2023  MRI BRAIN:   No acute infarction, intracranial hemorrhage or mass lesion.   Moderate periventricular and subcortical white matter T2 FLAIR hyperintensities, nonspecific, but likely representing chronic microangiopathic changes. This is stable compared to previous MRI 03/05/2020.   No hydrocephalus.  No extra-axial fluid collections. The skull base flow voids are present.   The visualized intraorbital contents are normal. The imaged portions of the paranasal sinuses are clear. The mastoid air cells are clear. The visualized osseous structures, soft tissues and partially visualized parotid glands appear normal.        MRA HEAD:   Normal distal internal carotid arteries.   The proximal anterior, middle and posterior cerebral arteries are patent bilaterally. Normal vertebrobasilar system. Vertebrobasilar system is slightly diminutive as bilateral PCAs are largely supplied by the PCOM.   No evidence of vascular stenosis, occlusion, aneurysm or vascular malformation.       No acute intracranial abnormality. Moderate microangiopathic disease, unchanged from previous MRI 03/05/2020.   Intracranial vasculature is within normal limits. No large vessel occlusion, high-grade stenosis, or aneurysm.       MRI brain wo IV contrast  No acute infarction, intracranial hemorrhage or mass lesion.   Moderate periventricular and subcortical white matter T2 FLAIR hyperintensities, nonspecific, but likely representing chronic microangiopathic changes. This is stable compared to previous MRI 03/05/2020.   No hydrocephalus.  No extra-axial fluid collections. The skull base flow voids are present.   The visualized intraorbital contents are normal. The imaged portions of the paranasal sinuses are clear. The mastoid air cells are clear. The visualized osseous structures, soft tissues and partially visualized parotid glands appear normal.   MRA HEAD:   Normal distal internal carotid arteries.   The proximal anterior, middle and posterior cerebral arteries are patent bilaterally.   Normal vertebrobasilar system. Vertebrobasilar system is slightly diminutive as bilateral PCAs are largely supplied by the PCOM.   No evidence of vascular stenosis, occlusion, aneurysm or vascular malformation.     No acute intracranial abnormality. Moderate microangiopathic disease, unchanged from previous MRI 03/05/2020.   Intracranial vasculature is within normal limits. No large vessel occlusion, high-grade stenosis, or aneurysm.         CT head wo IV contrast  Result Date: 10/9/2023  Interpreted By:  Yury Knight, STUDY: CT HEAD WO IV CONTRAST;  10/9/2023 9:05 pm    INDICATION: Signs/Symptoms:stroke like symptoms.   COMPARISON: 09/19/2021   ACCESSION NUMBER(S): [id number]   ORDERING CLINICIAN: JOEY HER   TECHNIQUE: CT axial images through the Brain were obtained without contrast.   FINDINGS: There is no mass effect, hemorrhage, or infarct. The ventricles appear normal. Gray-white differentiation is maintained.. Senescent changes The visualized paranasal sinuses appear clear. Punctate calcification noted along the lateral aspect of the 4th ventricle again seen. If persistent concern, consider MRI       No acute intracranial abnormality. Senescent changes. If persistent concern, consider MRI   MACRO: None.   Signed by: Yury Knight 10/9/2023 9:39 PM Dictation workstation:   [id number]         HOSPITAL PROBLEM LIST ON ADMISSION:  Left facial numbness [R20.0]   Strokelike symptoms  Hypertension  Hyperlipidemia      PLAN ON ADMISSION:  Patient was admitted to medical telemetry for closer monitoring of cardiorespiratory and neuro status.  She was ordered MRI of the head and MRI of the brain.  Initially it does not show any acute pathology.  She was evaluated by PT OT.  Her home medications were resumed.  Patient symptoms are resolving.  She will be observed overnight.  Possible discharge tomorrow.    *Some of this note was completed using Dragon voice recognition technology and may include unintended errors with respect to translation of words, typographical errors or grammar errors which may not have been identified prior to finalization of the chart note.

## 2023-11-20 NOTE — DISCHARGE SUMMARY
Discharge Diagnosis:   Migraine     Discharge Date: 10/10/2023  Admission Date: 10/9/2023     Discharge Physical Exam:    HEENT:  Atraumatic, PERRL. Conjunctivae clear.  Moist nasal mucous membranes.   Neck:  Supple without thyromegaly or lymphadenopathy.  Lungs:  Clear to auscultation without rales, rhonchi, or rub.  Heart:  RRR, S1, S2, without M.  Abdomen:  Soft, non tender, no organ enlargement.  Bowel sounds present . No CVA tenderness.  Extremities:  No edema. No calf swelling or tenderness.    Skin:  No rash, ecchymosis or erythema.    Hospital Course:   Lalita Dumont is a pleasant 63-year-old female who came with left facial weakness.  She was admitted to rule out TIA versus CVA.  She had MRI of the brain and also MRI of the head.  Patient was evaluated by neurologist.  She did not have any acute infarction, hemorrhage or cerebral mass.  It was thought that she possibly had a variant of migraine of her symptoms.  Neurology was comfortable sending her home with follow-up as an outpatient.    On the day of discharge patient was ambulating well, eating and drinking well. Physical exam was essentially benign and was at baseline. Blood chemistry and other lab testing results were at acceptable for discharge. Patient remained hemodynamically stable and with no further acute concern. Patient verbalized understanding of discharge medications and the expected adverse effects, if any.       The detail of the hospital course  including admission H&P, consult recommendations, biochemical lab results, imaging studies can be found in EHR of [facility]. Total 29 minutes time was spent on discharge exam, medicine reconciliation, discharge instructions and preparing discharge summary.       Home Going Medications:      Medication List      CONTINUE taking these medications     albuterol 90 mcg/actuation inhaler   amLODIPine 5 mg tablet; Commonly known as: Norvasc   aspirin 81 mg EC tablet   atorvastatin 40 mg  tablet; Commonly known as: Lipitor   cholecalciferol 25 MCG (1000 UT) tablet; Commonly known as: Vitamin D-3   clopidogrel 75 mg tablet; Commonly known as: Plavix   cyanocobalamin 1,000 mcg tablet; Commonly known as: Vitamin B-12   PlaqueniL 200 mg tablet; Generic drug: hydroxychloroquine   TylenoL 325 mg tablet; Generic drug: acetaminophen       Outpatient Follow up:   PCP: Victoria Shelton MD   The transitional care management team of [facility] will contact patient.    Patient was also advised to call [phone] for hospital discharge follow-up with PCP in 7 day from today.          PS: This note was completed using Dragon voice recognition technology and may include unintended errors with respect to translation of words, typographical or grammar errors which may not have been identified while finalizing the chart.

## 2023-11-28 ENCOUNTER — PATIENT OUTREACH (OUTPATIENT)
Dept: PRIMARY CARE | Facility: CLINIC | Age: 63
End: 2023-11-28
Payer: MEDICARE

## 2023-11-28 NOTE — PROGRESS NOTES
Call placed regarding one month +post discharge follow up call.  At time of outreach call the patient feels as if their condition has returned to baseline since initial visit with PCP or specialist.  Questions or concerns regarding recovery period addressed at this time.   Reviewed any PCP or specialists progress notes/labs/radiology reports if applicable and addressed any questions or concerns.

## 2024-01-11 ENCOUNTER — PATIENT OUTREACH (OUTPATIENT)
Dept: PRIMARY CARE | Facility: CLINIC | Age: 64
End: 2024-01-11

## 2024-04-05 ENCOUNTER — LAB (OUTPATIENT)
Dept: LAB | Facility: LAB | Age: 64
End: 2024-04-05
Payer: COMMERCIAL

## 2024-04-05 DIAGNOSIS — I10 PRIMARY HYPERTENSION: ICD-10-CM

## 2024-04-05 DIAGNOSIS — E55.9 VITAMIN D DEFICIENCY: ICD-10-CM

## 2024-04-05 DIAGNOSIS — E11.65 TYPE 2 DIABETES MELLITUS WITH HYPERGLYCEMIA, UNSPECIFIED WHETHER LONG TERM INSULIN USE (MULTI): ICD-10-CM

## 2024-04-05 DIAGNOSIS — E78.2 MIXED HYPERLIPIDEMIA: ICD-10-CM

## 2024-04-05 DIAGNOSIS — E03.9 ACQUIRED HYPOTHYROIDISM: ICD-10-CM

## 2024-04-05 LAB
25(OH)D3 SERPL-MCNC: 31 NG/ML (ref 30–100)
ALBUMIN SERPL BCP-MCNC: 4.5 G/DL (ref 3.4–5)
ALP SERPL-CCNC: 60 U/L (ref 33–136)
ALT SERPL W P-5'-P-CCNC: 21 U/L (ref 7–45)
ANION GAP SERPL CALC-SCNC: 11 MMOL/L (ref 10–20)
AST SERPL W P-5'-P-CCNC: 22 U/L (ref 9–39)
BASOPHILS # BLD AUTO: 0.03 X10*3/UL (ref 0–0.1)
BASOPHILS NFR BLD AUTO: 0.7 %
BILIRUB SERPL-MCNC: 0.9 MG/DL (ref 0–1.2)
BUN SERPL-MCNC: 20 MG/DL (ref 6–23)
CALCIUM SERPL-MCNC: 9.9 MG/DL (ref 8.6–10.3)
CHLORIDE SERPL-SCNC: 103 MMOL/L (ref 98–107)
CHOLEST SERPL-MCNC: 171 MG/DL (ref 0–199)
CHOLESTEROL/HDL RATIO: 3.6
CO2 SERPL-SCNC: 29 MMOL/L (ref 21–32)
CREAT SERPL-MCNC: 0.87 MG/DL (ref 0.5–1.05)
EGFRCR SERPLBLD CKD-EPI 2021: 74 ML/MIN/1.73M*2
EOSINOPHIL # BLD AUTO: 0.13 X10*3/UL (ref 0–0.7)
EOSINOPHIL NFR BLD AUTO: 3 %
ERYTHROCYTE [DISTWIDTH] IN BLOOD BY AUTOMATED COUNT: 13.2 % (ref 11.5–14.5)
EST. AVERAGE GLUCOSE BLD GHB EST-MCNC: 166 MG/DL
GLUCOSE SERPL-MCNC: 150 MG/DL (ref 74–99)
HBA1C MFR BLD: 7.4 %
HCT VFR BLD AUTO: 44.2 % (ref 36–46)
HDLC SERPL-MCNC: 47.5 MG/DL
HGB BLD-MCNC: 14.4 G/DL (ref 12–16)
IMM GRANULOCYTES # BLD AUTO: 0.01 X10*3/UL (ref 0–0.7)
IMM GRANULOCYTES NFR BLD AUTO: 0.2 % (ref 0–0.9)
LDLC SERPL CALC-MCNC: 81 MG/DL
LYMPHOCYTES # BLD AUTO: 1.1 X10*3/UL (ref 1.2–4.8)
LYMPHOCYTES NFR BLD AUTO: 25.4 %
MCH RBC QN AUTO: 29.6 PG (ref 26–34)
MCHC RBC AUTO-ENTMCNC: 32.6 G/DL (ref 32–36)
MCV RBC AUTO: 91 FL (ref 80–100)
MONOCYTES # BLD AUTO: 0.47 X10*3/UL (ref 0.1–1)
MONOCYTES NFR BLD AUTO: 10.9 %
NEUTROPHILS # BLD AUTO: 2.59 X10*3/UL (ref 1.2–7.7)
NEUTROPHILS NFR BLD AUTO: 59.8 %
NON HDL CHOLESTEROL: 124 MG/DL (ref 0–149)
NRBC BLD-RTO: 0 /100 WBCS (ref 0–0)
PLATELET # BLD AUTO: 197 X10*3/UL (ref 150–450)
POTASSIUM SERPL-SCNC: 4.1 MMOL/L (ref 3.5–5.3)
PROT SERPL-MCNC: 7.7 G/DL (ref 6.4–8.2)
RBC # BLD AUTO: 4.87 X10*6/UL (ref 4–5.2)
SODIUM SERPL-SCNC: 139 MMOL/L (ref 136–145)
T4 FREE SERPL-MCNC: 0.5 NG/DL (ref 0.61–1.12)
TRIGL SERPL-MCNC: 214 MG/DL (ref 0–149)
TSH SERPL-ACNC: 5.67 MIU/L (ref 0.44–3.98)
VLDL: 43 MG/DL (ref 0–40)
WBC # BLD AUTO: 4.3 X10*3/UL (ref 4.4–11.3)

## 2024-04-05 PROCEDURE — 80061 LIPID PANEL: CPT

## 2024-04-05 PROCEDURE — 84439 ASSAY OF FREE THYROXINE: CPT

## 2024-04-05 PROCEDURE — 36415 COLL VENOUS BLD VENIPUNCTURE: CPT

## 2024-04-05 PROCEDURE — 82306 VITAMIN D 25 HYDROXY: CPT

## 2024-04-05 PROCEDURE — 80053 COMPREHEN METABOLIC PANEL: CPT

## 2024-04-05 PROCEDURE — 83036 HEMOGLOBIN GLYCOSYLATED A1C: CPT

## 2024-04-05 PROCEDURE — 84443 ASSAY THYROID STIM HORMONE: CPT

## 2024-04-05 PROCEDURE — 85025 COMPLETE CBC W/AUTO DIFF WBC: CPT

## 2024-04-06 DIAGNOSIS — I10 HYPERTENSION, UNSPECIFIED TYPE: ICD-10-CM

## 2024-04-08 RX ORDER — SPIRONOLACTONE 25 MG/1
25 TABLET ORAL DAILY
Qty: 90 TABLET | Refills: 1 | Status: SHIPPED | OUTPATIENT
Start: 2024-04-08

## 2024-04-08 RX ORDER — HYDROCHLOROTHIAZIDE 12.5 MG/1
12.5 TABLET ORAL DAILY
Qty: 90 TABLET | Refills: 1 | Status: SHIPPED | OUTPATIENT
Start: 2024-04-08

## 2024-04-08 NOTE — RESULT ENCOUNTER NOTE
Some labs are abnormal.  She can have a virtual visit to discuss or she can keep her scheduled apt later this month.  Whatever she would like to do.

## 2024-04-22 DIAGNOSIS — F32.A DEPRESSION, UNSPECIFIED DEPRESSION TYPE: ICD-10-CM

## 2024-04-22 RX ORDER — SERTRALINE HYDROCHLORIDE 100 MG/1
100 TABLET, FILM COATED ORAL DAILY
Qty: 90 TABLET | Refills: 1 | Status: SHIPPED | OUTPATIENT
Start: 2024-04-22

## 2024-04-26 NOTE — PROGRESS NOTES
"Subjective :  Chief Complaint: Lalita Dumont is an 64 y.o. female here for an annual wellness visit and general medical care and f/u.     HPI:  Here for an annual wellness visit.  Medications:  Diet: discussed guidelines  Exercise:  discussed guidelines  Sleep:  fine  Mood:  good usually  Ophthalmology:  needs another check up  Dental:  edentulous          Review of Systems  All systems reviewed and negative except for what was mentioned in the HPI    Objective   /82 (BP Location: Left arm, Patient Position: Sitting, BP Cuff Size: Large adult)   Pulse 73   Temp 36.3 °C (97.4 °F) (Temporal)   Ht 1.575 m (5' 2\")   Wt 89.8 kg (198 lb)   SpO2 95% Comment: RA  BMI 36.21 kg/m²     Physical Exam  General:  Alert, oriented, no acute distress  Eyes:  Sclerae white, PER, conjunctivae clear  ENT:  No nasal congestion.    Neck: Supple  Endocrine:  No thyromegaly. No thyroid nodes.   Respiratory:  Normal breath sounds.  No wheezing, rhonchi nor crackles.  No dyspnea.  Cardiovascular:  S1 and S2 positive.  Regular rate and rhythm.  No gallops.  No murmurs.  Vascular:  Trace edema bilateral ankles.  Skin warm and dry.  CNS:  No gross neurological deficits.  Gait within normal limits.    Psychiatric:  Affect is positive and appropriate.  No depression.  No anxiety.    Imaging:  No results found.     Labs reviewed:    Lab Results   Component Value Date    WBC 4.3 (L) 04/05/2024    RBC 4.87 04/05/2024    HGB 14.4 04/05/2024    HCT 44.2 04/05/2024    MCV 91 04/05/2024     04/05/2024    CHOL 171 04/05/2024    TRIG 214 (H) 04/05/2024    HDL 47.5 04/05/2024    ALT 21 04/05/2024    AST 22 04/05/2024     04/05/2024    K 4.1 04/05/2024     04/05/2024    CREATININE 0.87 04/05/2024    BUN 20 04/05/2024    CO2 29 04/05/2024    TSH 5.67 (H) 04/05/2024    INR 1.1 10/09/2023    HGBA1C 7.4 (H) 04/05/2024       Past Medical, Surgical, and Family History reviewed and updated in chart.    I have reviewed and reconciled " the medication list with the patient today.   Current Outpatient Medications:     acetaminophen (TylenoL) 325 mg tablet, Take 2 tablets (650 mg) by mouth every 4 hours if needed., Disp: , Rfl:     albuterol 90 mcg/actuation inhaler, Inhale 2 puffs every 6 hours if needed., Disp: , Rfl:     aspirin 81 mg EC tablet, Take 1 tablet (81 mg) by mouth once daily., Disp: , Rfl:     blood sugar diagnostic (Contour Next Test Strips) strip, once daily., Disp: , Rfl:     cholecalciferol (Vitamin D-3) 25 MCG (1000 UT) tablet, Take 2 tablets (2,000 Units) by mouth 2 times a day., Disp: , Rfl:     clopidogrel (Plavix) 75 mg tablet, Take 1 tablet (75 mg) by mouth once daily., Disp: , Rfl:     cyanocobalamin (Vitamin B-12) 1,000 mcg tablet, Take 1 tablet (1,000 mcg) by mouth once daily., Disp: , Rfl:     hydroCHLOROthiazide (Microzide) 12.5 mg tablet, take 1 tablet by mouth once daily, Disp: 90 tablet, Rfl: 1    ipratropium-albuteroL (Duo-Neb) 0.5-2.5 mg/3 mL nebulizer solution, Take 3 mL by nebulization every 4 hours if needed for wheezing., Disp: , Rfl:     nitroglycerin (Nitrostat) 0.4 mg SL tablet, Place 1 tablet (0.4 mg) under the tongue every 5 minutes if needed for chest pain., Disp: 90 tablet, Rfl: 0    PlaqueniL 200 mg tablet, Take 2 tablets (400 mg) by mouth once daily., Disp: , Rfl:     sertraline (Zoloft) 100 mg tablet, take 1 tablet by mouth once daily, Disp: 90 tablet, Rfl: 1    spironolactone (Aldactone) 25 mg tablet, take 1 tablet by mouth once daily, Disp: 90 tablet, Rfl: 1    amLODIPine (Norvasc) 5 mg tablet, Take 1 tablet (5 mg) by mouth once daily., Disp: 90 tablet, Rfl: 3    atorvastatin (Lipitor) 40 mg tablet, Take 1 tablet (40 mg) by mouth once daily., Disp: 90 tablet, Rfl: 3    levoFLOXacin (Levaquin) 500 mg tablet, Take 1 tablet (500 mg) by mouth once daily for 10 days., Disp: 10 tablet, Rfl: 0    levothyroxine (Synthroid, Levoxyl) 25 mcg tablet, Take 1 tablet (25 mcg) by mouth once daily., Disp: 90 tablet,  Rfl: 0     List of current healthcare providers:  Patient Care Team:  Victoria Shelton MD as PCP - General (Family Medicine)  Victoria Shelton MD as PCP - [facility] PCP  Victoria Shelton MD as PCP - [facility]vantage PCP     Assessment/Plan :  Problem List Items Addressed This Visit       Acute sinusitis     Will start an antibiotic as per orders.         Hypertension     Acceptable.  Continue present management.         Relevant Medications    amLODIPine (Norvasc) 5 mg tablet    Hypothyroidism     Monitoring.  On next lab order.         Relevant Medications    levothyroxine (Synthroid, Levoxyl) 25 mcg tablet    Other Relevant Orders    Tsh With Reflex To Free T4 If Abnormal    Medicare annual wellness visit, subsequent - Primary     Age appropriate preventive measures reviewed and discussed.  Diet and exercise recommendations discussed.             Mixed hyperlipidemia     Monitoring.  On next lab order.         Relevant Medications    atorvastatin (Lipitor) 40 mg tablet     Other Visit Diagnoses       Acute pharyngitis, unspecified etiology        Relevant Medications    levoFLOXacin (Levaquin) 500 mg tablet    Routine general medical examination at health care facility              The following health maintenance schedule was reviewed with the patient and provided in printed form in the after visit summary:  Health Maintenance   Topic Date Due    MMR Vaccines (1 of 1 - Standard series) Never done    Diabetes: Foot Exam  Never done    Diabetes: Retinopathy Screening  Never done    Hepatitis C Screening  Never done    RSV Pregnant patients and/or  patients aged 60+ years (1 - 1-dose 60+ series) Never done    Pneumococcal Vaccine: 65+ Years (2 of 2 - PCV) 09/20/2022    Zoster Vaccines (2 of 2) 06/22/2023    COVID-19 Vaccine (3 - 2023-24 season) 09/01/2023    Mammogram  05/01/2024    Diabetes: Hemoglobin A1C  07/05/2024    Influenza Vaccine (Season Ended)  09/01/2024    Diabetes: Urine Protein Screening  10/26/2024    Medicare Annual Wellness Visit (AWV)  10/26/2024    TSH Level  04/05/2025    Lipid Panel  04/05/2025    Bone Density Scan  05/02/2025    DTaP/Tdap/Td Vaccines (2 - Td or Tdap) 12/16/2025    Colorectal Cancer Screening  07/05/2032    HIB Vaccines  Aged Out    Hepatitis B Vaccines  Aged Out    IPV Vaccines  Aged Out    Hepatitis A Vaccines  Aged Out    Meningococcal Vaccine  Aged Out    Rotavirus Vaccines  Aged Out    HPV Vaccines  Aged Out    Medicare Initial Physical (IPPE)  Discontinued    Irritable Bowel Syndrome  Discontinued       Advance Care Planning   No living will or POA.        Reviewed lab/testing results with the patient and provided patient education regarding the results.  Continue current medications as listed  Follow up in 6 months.  Sooner as needed.

## 2024-04-29 ENCOUNTER — OFFICE VISIT (OUTPATIENT)
Dept: PRIMARY CARE | Facility: CLINIC | Age: 64
End: 2024-04-29
Payer: COMMERCIAL

## 2024-04-29 VITALS
TEMPERATURE: 97.4 F | HEART RATE: 73 BPM | DIASTOLIC BLOOD PRESSURE: 82 MMHG | WEIGHT: 198 LBS | SYSTOLIC BLOOD PRESSURE: 128 MMHG | OXYGEN SATURATION: 95 % | HEIGHT: 62 IN | BODY MASS INDEX: 36.44 KG/M2

## 2024-04-29 DIAGNOSIS — E03.9 ACQUIRED HYPOTHYROIDISM: ICD-10-CM

## 2024-04-29 DIAGNOSIS — J02.9 ACUTE PHARYNGITIS, UNSPECIFIED ETIOLOGY: ICD-10-CM

## 2024-04-29 DIAGNOSIS — Z00.00 ROUTINE GENERAL MEDICAL EXAMINATION AT HEALTH CARE FACILITY: ICD-10-CM

## 2024-04-29 DIAGNOSIS — Z00.00 MEDICARE ANNUAL WELLNESS VISIT, SUBSEQUENT: Primary | ICD-10-CM

## 2024-04-29 DIAGNOSIS — J01.00 ACUTE NON-RECURRENT MAXILLARY SINUSITIS: ICD-10-CM

## 2024-04-29 DIAGNOSIS — I10 PRIMARY HYPERTENSION: ICD-10-CM

## 2024-04-29 DIAGNOSIS — E78.2 MIXED HYPERLIPIDEMIA: ICD-10-CM

## 2024-04-29 PROCEDURE — 3051F HG A1C>EQUAL 7.0%<8.0%: CPT | Performed by: FAMILY MEDICINE

## 2024-04-29 PROCEDURE — 1170F FXNL STATUS ASSESSED: CPT | Performed by: FAMILY MEDICINE

## 2024-04-29 PROCEDURE — 3079F DIAST BP 80-89 MM HG: CPT | Performed by: FAMILY MEDICINE

## 2024-04-29 PROCEDURE — 1124F ACP DISCUSS-NO DSCNMKR DOCD: CPT | Performed by: FAMILY MEDICINE

## 2024-04-29 PROCEDURE — 3048F LDL-C <100 MG/DL: CPT | Performed by: FAMILY MEDICINE

## 2024-04-29 PROCEDURE — 1160F RVW MEDS BY RX/DR IN RCRD: CPT | Performed by: FAMILY MEDICINE

## 2024-04-29 PROCEDURE — 99213 OFFICE O/P EST LOW 20 MIN: CPT | Performed by: FAMILY MEDICINE

## 2024-04-29 PROCEDURE — 1159F MED LIST DOCD IN RCRD: CPT | Performed by: FAMILY MEDICINE

## 2024-04-29 PROCEDURE — G0439 PPPS, SUBSEQ VISIT: HCPCS | Performed by: FAMILY MEDICINE

## 2024-04-29 PROCEDURE — 1036F TOBACCO NON-USER: CPT | Performed by: FAMILY MEDICINE

## 2024-04-29 PROCEDURE — 3074F SYST BP LT 130 MM HG: CPT | Performed by: FAMILY MEDICINE

## 2024-04-29 RX ORDER — AMLODIPINE BESYLATE 5 MG/1
5 TABLET ORAL DAILY
Qty: 90 TABLET | Refills: 3 | Status: SHIPPED | OUTPATIENT
Start: 2024-04-29

## 2024-04-29 RX ORDER — LEVOFLOXACIN 500 MG/1
500 TABLET, FILM COATED ORAL DAILY
Qty: 10 TABLET | Refills: 0 | Status: SHIPPED | OUTPATIENT
Start: 2024-04-29 | End: 2024-05-09

## 2024-04-29 RX ORDER — LEVOTHYROXINE SODIUM 25 UG/1
25 TABLET ORAL DAILY
Qty: 90 TABLET | Refills: 0 | Status: SHIPPED | OUTPATIENT
Start: 2024-04-29 | End: 2025-04-29

## 2024-04-29 RX ORDER — ATORVASTATIN CALCIUM 40 MG/1
40 TABLET, FILM COATED ORAL DAILY
Qty: 90 TABLET | Refills: 3 | Status: SHIPPED | OUTPATIENT
Start: 2024-04-29

## 2024-04-29 ASSESSMENT — ACTIVITIES OF DAILY LIVING (ADL)
TAKING_MEDICATION: INDEPENDENT
BATHING: INDEPENDENT
DRESSING: INDEPENDENT
DOING_HOUSEWORK: INDEPENDENT
MANAGING_FINANCES: INDEPENDENT
GROCERY_SHOPPING: INDEPENDENT

## 2024-04-29 ASSESSMENT — PATIENT HEALTH QUESTIONNAIRE - PHQ9
SUM OF ALL RESPONSES TO PHQ9 QUESTIONS 1 AND 2: 0
1. LITTLE INTEREST OR PLEASURE IN DOING THINGS: NOT AT ALL
2. FEELING DOWN, DEPRESSED OR HOPELESS: NOT AT ALL

## 2024-04-29 ASSESSMENT — ENCOUNTER SYMPTOMS
OCCASIONAL FEELINGS OF UNSTEADINESS: 1
LOSS OF SENSATION IN FEET: 1
DEPRESSION: 0

## 2024-05-16 ENCOUNTER — APPOINTMENT (OUTPATIENT)
Dept: CARDIOLOGY | Facility: CLINIC | Age: 64
End: 2024-05-16
Payer: COMMERCIAL

## 2024-05-21 ENCOUNTER — OFFICE VISIT (OUTPATIENT)
Dept: CARDIOLOGY | Facility: CLINIC | Age: 64
End: 2024-05-21
Payer: COMMERCIAL

## 2024-05-21 VITALS
HEIGHT: 62 IN | DIASTOLIC BLOOD PRESSURE: 62 MMHG | HEART RATE: 80 BPM | WEIGHT: 198 LBS | SYSTOLIC BLOOD PRESSURE: 122 MMHG | BODY MASS INDEX: 36.44 KG/M2

## 2024-05-21 DIAGNOSIS — E78.2 MIXED HYPERLIPIDEMIA: ICD-10-CM

## 2024-05-21 DIAGNOSIS — Z98.61 CAD S/P PERCUTANEOUS CORONARY ANGIOPLASTY: ICD-10-CM

## 2024-05-21 DIAGNOSIS — Z87.891 FORMER SMOKER: ICD-10-CM

## 2024-05-21 DIAGNOSIS — E11.65 TYPE 2 DIABETES MELLITUS WITH HYPERGLYCEMIA, UNSPECIFIED WHETHER LONG TERM INSULIN USE (MULTI): ICD-10-CM

## 2024-05-21 DIAGNOSIS — J45.909 ASTHMA, UNSPECIFIED ASTHMA SEVERITY, UNSPECIFIED WHETHER COMPLICATED, UNSPECIFIED WHETHER PERSISTENT (HHS-HCC): ICD-10-CM

## 2024-05-21 DIAGNOSIS — I25.10 CAD S/P PERCUTANEOUS CORONARY ANGIOPLASTY: ICD-10-CM

## 2024-05-21 DIAGNOSIS — E03.9 HYPOTHYROIDISM, UNSPECIFIED TYPE: ICD-10-CM

## 2024-05-21 DIAGNOSIS — I10 PRIMARY HYPERTENSION: ICD-10-CM

## 2024-05-21 PROCEDURE — 1160F RVW MEDS BY RX/DR IN RCRD: CPT | Performed by: INTERNAL MEDICINE

## 2024-05-21 PROCEDURE — 1036F TOBACCO NON-USER: CPT | Performed by: INTERNAL MEDICINE

## 2024-05-21 PROCEDURE — 3074F SYST BP LT 130 MM HG: CPT | Performed by: INTERNAL MEDICINE

## 2024-05-21 PROCEDURE — 3048F LDL-C <100 MG/DL: CPT | Performed by: INTERNAL MEDICINE

## 2024-05-21 PROCEDURE — 3051F HG A1C>EQUAL 7.0%<8.0%: CPT | Performed by: INTERNAL MEDICINE

## 2024-05-21 PROCEDURE — 1159F MED LIST DOCD IN RCRD: CPT | Performed by: INTERNAL MEDICINE

## 2024-05-21 PROCEDURE — 99213 OFFICE O/P EST LOW 20 MIN: CPT | Performed by: INTERNAL MEDICINE

## 2024-05-21 PROCEDURE — 3078F DIAST BP <80 MM HG: CPT | Performed by: INTERNAL MEDICINE

## 2024-05-21 PROCEDURE — 3008F BODY MASS INDEX DOCD: CPT | Performed by: INTERNAL MEDICINE

## 2024-05-21 NOTE — PATIENT INSTRUCTIONS
Patient to follow up in 9 months with Dr. Tae Blanchard MD [facility]     No changes today.   Continue same medications and treatments.   Patient educated on proper medication use.   Patient educated on risk factor modification.   Please bring any lab results from other providers / physicians to your next appointment.     Please bring all medicines, vitamins, and herbal supplements with you when you come to the office.     Prescriptions will not be filled unless you are compliant with your follow up appointments or have a follow up appointment scheduled as per instruction of your physician. Refills should be requested at the time of your visit.    Vasile WAKEFIELD RN am scribing for and in the presence of Dr. Tae Blanchard MD [facility]

## 2024-05-22 NOTE — PROGRESS NOTES
Phan is a 4 year old boy with a previous diagnosis of an autism spectrum disorder. He continues to have a qualitative impairment in language, socialization and imaginative play. He is making better eye contact than in the past. He sleeps well. He has not lost any skills since the diagnosis was made. I have talked with mom about the following:    I agree that he continues to have a diagnosis consistent with an autism spectrum disorder.   Look into the [employer] for elopement issues.   3.  Please note what drives behavior and work on sensory breaks as a reward for something that he did well.   4.   Continue encouraging positive behaviors.  5.   Continue with therapies, please let me know if you need new orders.  6. Call with an update. My nurse is Tanja Romero at [phone].  7. Follow up can be yearly if needed.  8. Genetic testing can be done, order placed.   9. Continue looking into other academic options. [facility], [facility] etc.        Referred by Dr. Hassan ref. provider found provider found for   Chief Complaint   Patient presents with    Follow-up     9 month        History of Present Illness  Lalita Dumont is a 64 y.o. year old female patient doing well from a cardiac standpoint no complaint no symptoms of chest pain or shortness of breath.  Denies any symptoms of palpitations syncope or presyncope.  I discussed with the patient Kneifl continue medication will call for any problem and follow-up as scheduled    Past Medical History  Past Medical History:   Diagnosis Date    CHF (congestive heart failure) (Multi)     Coronary artery disease     Personal history of other diseases of the circulatory system     History of hypertension    Personal history of other diseases of the musculoskeletal system and connective tissue     History of rheumatoid arthritis    Personal history of other diseases of the respiratory system     History of asthma    Vitamin D deficiency, unspecified     Vitamin D deficiency       Social History  Social History     Tobacco Use    Smoking status: Former     Types: Cigarettes    Smokeless tobacco: Never   Vaping Use    Vaping status: Never Used   Substance Use Topics    Alcohol use: Never    Drug use: Never       Family History     Family History   Problem Relation Name Age of Onset    Cancer Mother      Other (acute myocardial infarction) Father      Cancer Sister      Cancer Brother      Diabetes Brother      Cancer Maternal Grandmother      Cancer Maternal Grandfather      Cancer Paternal Grandfather      Diabetes Paternal Grandfather      Diabetes Other paternal aunt        Review of Systems  As per HPI, all other systems reviewed and negative.    Allergies:  Allergies   Allergen Reactions    Bactrim [Sulfamethoxazole-Trimethoprim] GI Upset    Darvocet A500 [Propoxyphene N-Acetaminophen] Hives    Erythromycin Itching and Swelling    Lisinopril Swelling    Omeprazole Hives    Penicillins Swelling    Azithromycin Rash         Outpatient Medications:  Current Outpatient Medications   Medication Instructions    acetaminophen (TYLENOL) 650 mg, oral, Every 4 hours PRN    albuterol 90 mcg/actuation inhaler 2 puffs, inhalation, Every 6 hours PRN    amLODIPine (NORVASC) 5 mg, oral, Daily    aspirin 81 mg EC tablet 1 tablet, oral, Daily    atorvastatin (LIPITOR) 40 mg, oral, Daily    blood sugar diagnostic (Contour Next Test Strips) strip Daily RT    cholecalciferol (VITAMIN D-3) 2,000 Units, oral, 2 times daily    clopidogrel (Plavix) 75 mg tablet 1 tablet, oral, Daily    cyanocobalamin (Vitamin B-12) 1,000 mcg tablet 1 tablet, oral, Daily    hydroCHLOROthiazide (MICROZIDE) 12.5 mg, oral, Daily    ipratropium-albuteroL (Duo-Neb) 0.5-2.5 mg/3 mL nebulizer solution 3 mL, nebulization, Every 4 hours PRN    levothyroxine (SYNTHROID, LEVOXYL) 25 mcg, oral, Daily    nitroglycerin (NITROSTAT) 0.4 mg, sublingual, Every 5 min PRN    PlaqueniL 400 mg, oral, Daily    sertraline (ZOLOFT) 100 mg, oral, Daily    spironolactone (ALDACTONE) 25 mg, oral, Daily         Vitals:  Vitals:    05/21/24 1123   BP: 122/62   Pulse: 80       Physical Exam:  Physical Exam  Vitals and nursing note reviewed.   Constitutional:       Appearance: Normal appearance. She is normal weight.   HENT:      Head: Normocephalic and atraumatic.   Eyes:      Extraocular Movements: Extraocular movements intact.      Pupils: Pupils are equal, round, and reactive to light.   Cardiovascular:      Rate and Rhythm: Normal rate and regular rhythm.      Pulses: Normal pulses.   Pulmonary:      Effort: Pulmonary effort is normal.      Breath sounds: Normal breath sounds.   Musculoskeletal:      Cervical back: Normal range of motion.      Right lower leg: No edema.      Left lower leg: No edema.   Skin:     General: Skin is warm and dry.   Neurological:      General: No focal deficit present.      Mental Status: She is alert and oriented to person, place, and time.             Assessment/Plan    Diagnoses and all orders for this visit:  CAD S/P percutaneous coronary angioplasty  Primary hypertension  Mixed hyperlipidemia  BMI 36.0-36.9,adult  Hypothyroidism, unspecified type  Type 2 diabetes mellitus with hyperglycemia, unspecified whether long term insulin use (Multi)  Asthma, unspecified asthma severity, unspecified whether complicated, unspecified whether persistent ([facility])  Former smoker          Tae Blanchard MD [facility]  Interventional Cardiology   of [facility]     Thank you for allowing me to participate in the care of this patient. Please do not hesitate to contact me with any further questions or concerns.

## 2024-06-02 ENCOUNTER — APPOINTMENT (OUTPATIENT)
Dept: RADIOLOGY | Facility: HOSPITAL | Age: 64
End: 2024-06-02
Payer: COMMERCIAL

## 2024-06-02 ENCOUNTER — HOSPITAL ENCOUNTER (EMERGENCY)
Facility: HOSPITAL | Age: 64
Discharge: HOME | End: 2024-06-02
Attending: STUDENT IN AN ORGANIZED HEALTH CARE EDUCATION/TRAINING PROGRAM
Payer: COMMERCIAL

## 2024-06-02 VITALS
HEART RATE: 79 BPM | SYSTOLIC BLOOD PRESSURE: 119 MMHG | WEIGHT: 197 LBS | OXYGEN SATURATION: 96 % | BODY MASS INDEX: 36.25 KG/M2 | RESPIRATION RATE: 18 BRPM | HEIGHT: 62 IN | TEMPERATURE: 98.1 F | DIASTOLIC BLOOD PRESSURE: 68 MMHG

## 2024-06-02 DIAGNOSIS — M79.642 HAND PAIN, LEFT: Primary | ICD-10-CM

## 2024-06-02 DIAGNOSIS — M79.643 TENDERNESS OF ANATOMICAL SNUFFBOX: ICD-10-CM

## 2024-06-02 PROCEDURE — 73090 X-RAY EXAM OF FOREARM: CPT | Mod: LT

## 2024-06-02 PROCEDURE — 73130 X-RAY EXAM OF HAND: CPT | Mod: LEFT SIDE | Performed by: RADIOLOGY

## 2024-06-02 PROCEDURE — 73110 X-RAY EXAM OF WRIST: CPT | Mod: LT

## 2024-06-02 PROCEDURE — 73110 X-RAY EXAM OF WRIST: CPT | Mod: LEFT SIDE | Performed by: RADIOLOGY

## 2024-06-02 PROCEDURE — 73090 X-RAY EXAM OF FOREARM: CPT | Mod: LEFT SIDE | Performed by: RADIOLOGY

## 2024-06-02 PROCEDURE — 73130 X-RAY EXAM OF HAND: CPT | Mod: LT

## 2024-06-02 PROCEDURE — 72125 CT NECK SPINE W/O DYE: CPT | Performed by: RADIOLOGY

## 2024-06-02 PROCEDURE — 70450 CT HEAD/BRAIN W/O DYE: CPT

## 2024-06-02 PROCEDURE — 99285 EMERGENCY DEPT VISIT HI MDM: CPT

## 2024-06-02 PROCEDURE — 70450 CT HEAD/BRAIN W/O DYE: CPT | Performed by: RADIOLOGY

## 2024-06-02 PROCEDURE — 72125 CT NECK SPINE W/O DYE: CPT

## 2024-06-02 ASSESSMENT — PAIN SCALES - GENERAL
PAINLEVEL_OUTOF10: 0 - NO PAIN
PAINLEVEL_OUTOF10: 5 - MODERATE PAIN

## 2024-06-02 ASSESSMENT — PAIN - FUNCTIONAL ASSESSMENT: PAIN_FUNCTIONAL_ASSESSMENT: 0-10

## 2024-06-02 ASSESSMENT — COLUMBIA-SUICIDE SEVERITY RATING SCALE - C-SSRS
2. HAVE YOU ACTUALLY HAD ANY THOUGHTS OF KILLING YOURSELF?: NO
6. HAVE YOU EVER DONE ANYTHING, STARTED TO DO ANYTHING, OR PREPARED TO DO ANYTHING TO END YOUR LIFE?: NO
1. IN THE PAST MONTH, HAVE YOU WISHED YOU WERE DEAD OR WISHED YOU COULD GO TO SLEEP AND NOT WAKE UP?: NO

## 2024-06-02 ASSESSMENT — PAIN DESCRIPTION - LOCATION: LOCATION: WRIST

## 2024-06-02 ASSESSMENT — PAIN DESCRIPTION - PAIN TYPE: TYPE: ACUTE PAIN

## 2024-06-02 ASSESSMENT — PAIN DESCRIPTION - ORIENTATION: ORIENTATION: LEFT

## 2024-06-02 NOTE — ED PROVIDER NOTES
HPI   Chief Complaint   Patient presents with    Wrist Pain     Pt caught herself from falling and injured her L wrist        Patient is a 64-year-old female with history of hypertension, hypothyroid, hyperlipidemia, lupus, depression, CAD, who presents for a fall.  Patient states that she fell 4 days ago onto her outstretched left hand.  Dors is pain of her left wrist.  States she was treating it with ice and over-the-counter pain meds at home and got tired of dealing with the pain so came in today.  Denies head or neck trauma.  Patient is on Plavix.  No LOC.  No numbness or tingling.                          [place] Coma Scale Score: 15                     Patient History   Past Medical History:   Diagnosis Date    CHF (congestive heart failure) (Multi)     Coronary artery disease     Personal history of other diseases of the circulatory system     History of hypertension    Personal history of other diseases of the musculoskeletal system and connective tissue     History of rheumatoid arthritis    Personal history of other diseases of the respiratory system     History of asthma    Vitamin D deficiency, unspecified     Vitamin D deficiency     Past Surgical History:   Procedure Laterality Date    CARPAL TUNNEL RELEASE  09/24/2015    Neuroplasty Decompression Median Nerve At Carpal Tunnel    MR HEAD ANGIO WO IV CONTRAST  3/5/2020    MR HEAD ANGIO WO IV CONTRAST 3/5/2020 [facility] CLINICAL LEGAC[facility]    MR HEAD ANGIO WO IV CONTRAST  10/10/2023    MR HEAD ANGIO WO IV CONTRAST 10/10/2023 [facility]    MR NECK ANGIO WO IV CONTRAST  3/5/2020    MR NECK ANGIO WO IV CONTRAST 3/5/2020 [facility] CLINICAL LEGACY    OTHER SURGICAL HISTORY  10/23/2021    Dilation and curettage    OTHER SURGICAL HISTORY  10/23/2021    Colonoscopy    OTHER SURGICAL HISTORY  10/23/2021    Esophagogastroduodenoscopy    OTHER SURGICAL HISTORY  10/23/2021    Cardiac catheterization with stent placement    TUBAL LIGATION  09/24/2015    Tubal Ligation     Family  History   Problem Relation Name Age of Onset    Cancer Mother      Other (acute myocardial infarction) Father      Cancer Sister      Cancer Brother      Diabetes Brother      Cancer Maternal Grandmother      Cancer Maternal Grandfather      Cancer Paternal Grandfather      Diabetes Paternal Grandfather      Diabetes Other paternal aunt      Social History     Tobacco Use    Smoking status: Former     Types: Cigarettes    Smokeless tobacco: Never   Vaping Use    Vaping status: Never Used   Substance Use Topics    Alcohol use: Never    Drug use: Never       Physical Exam   ED Triage Vitals [06/02/24 1423]   Temperature Heart Rate Respirations BP   36.7 °C (98.1 °F) 82 17 127/70      Pulse Ox Temp Source Heart Rate Source Patient Position   96 % Temporal Monitor --      BP Location FiO2 (%)     -- --       Physical Exam  Constitutional:       General: She is not in acute distress.  HENT:      Head: Normocephalic.   Eyes:      Extraocular Movements: Extraocular movements intact.      Conjunctiva/sclera: Conjunctivae normal.      Pupils: Pupils are equal, round, and reactive to light.   Cardiovascular:      Rate and Rhythm: Normal rate and regular rhythm.      Pulses: Normal pulses.      Heart sounds: Normal heart sounds.   Pulmonary:      Effort: Pulmonary effort is normal.      Breath sounds: Normal breath sounds.   Abdominal:      General: There is no distension.      Palpations: Abdomen is soft. There is no mass.      Tenderness: There is no abdominal tenderness. There is no guarding.   Musculoskeletal:         General: No deformity.      Cervical back: Normal range of motion and neck supple.      Right lower leg: No edema.      Left lower leg: No edema.      Comments: 2+ radial pulses bilaterally.  Tenderness palpation over left thumb snuffbox, left wrist with extension into left forearm.  Mild indentation in left wrist, no ecchymosis/crepitus. +TTP over left snuffbox.     Skin:     General: Skin is warm and dry.       Findings: No lesion or rash.   Neurological:      General: No focal deficit present.      Mental Status: She is alert and oriented to person, place, and time. Mental status is at baseline.      Cranial Nerves: No cranial nerve deficit.      Sensory: No sensory deficit.      Motor: No weakness.      Comments: Intact sensation over left hand except for mild decrease in sensation over posterior left hand.   Psychiatric:         Mood and Affect: Mood normal.         ED Course & MDM   Diagnoses as of 06/03/24 1125   Hand pain, left   Tenderness of anatomical snuffbox       Medical Decision Making  Patient is a 64-year-old female with above-stated past medical history presents for a fall.  Patient CT head and C-spine were negative for acute findings, x-rays did not show signs of acute findings, however did show degenerative changes.  Patient has snuffbox tenderness on the left, she will be placed in a thumb spica splint by nursing. I reevaluated the patient after placement and she has intact sensation and less than 2-second capillary refill.  I advised the patient on return precautions as well as follow-up instructions.  She will follow-up as an outpatient with orthopedics.  Patient was amenable to the plan.  Patient was discharged home in stable condition.    Disclaimer: This note was dictated using speech recognition software. Minor errors in transcription may be present. Please call if questions.     Marvin Rider MD  [facility]  Contact on [employer]        Problems Addressed:  Hand pain, left: acute illness or injury  Tenderness of anatomical snuffbox: acute illness or injury    Amount and/or Complexity of Data Reviewed  Radiology: ordered.        Procedure  Procedures     Marvin Rider MD  06/03/24 1126

## 2024-06-03 NOTE — RESULT ENCOUNTER NOTE
Please let Lalita know her xray did not show any fracture or dislocation.  She as arthritis changes in her hand, wrist and elbow.

## 2024-06-04 ENCOUNTER — OFFICE VISIT (OUTPATIENT)
Dept: ORTHOPEDIC SURGERY | Facility: CLINIC | Age: 64
End: 2024-06-04
Payer: COMMERCIAL

## 2024-06-04 DIAGNOSIS — S63.502A SPRAIN OF LEFT WRIST, INITIAL ENCOUNTER: ICD-10-CM

## 2024-06-04 PROCEDURE — 3008F BODY MASS INDEX DOCD: CPT | Performed by: ORTHOPAEDIC SURGERY

## 2024-06-04 PROCEDURE — 29075 APPL CST ELBW FNGR SHORT ARM: CPT | Performed by: ORTHOPAEDIC SURGERY

## 2024-06-04 PROCEDURE — 99214 OFFICE O/P EST MOD 30 MIN: CPT | Performed by: ORTHOPAEDIC SURGERY

## 2024-06-04 PROCEDURE — 3051F HG A1C>EQUAL 7.0%<8.0%: CPT | Performed by: ORTHOPAEDIC SURGERY

## 2024-06-04 PROCEDURE — 99204 OFFICE O/P NEW MOD 45 MIN: CPT | Performed by: ORTHOPAEDIC SURGERY

## 2024-06-04 PROCEDURE — 3048F LDL-C <100 MG/DL: CPT | Performed by: ORTHOPAEDIC SURGERY

## 2024-06-04 NOTE — PROGRESS NOTES
History present illness: Patient presents status post injury to the left wrist.  She fell into a doorway approximately 5 days ago.  She describes dorsal radial wrist pain.  History of rheumatoid arthritis lupus and fibromyalgia.  History of hyperlipidemia.  On Plaquenil and Plavix.      Past medical history: The patient's past medical history, family history, social history, and review of systems were documented on the patient medical intake.  The updated data was reviewed in the electronic medical record.  History is negative except otherwise stated in history of present illness.        Physical examination:  General: Alert and oriented to person, place, and time.  No acute distress and breathing comfortably: Pleasant and cooperative with examination.  HEENT: Head is normocephalic and atraumatic.  Neck: Supple, no visible swelling.  Cardiovascular: No palpable tachycardia  Lungs: No audible wheezing or labored breathing  Abdomen: Nondistended.  Extremities: Evaluation of the left upper extremity finds the patient had palpable radial artery at the wrist with brisk capillary refill to all digits.  Patient has intact sensation to axillary radial median and ulnar nerves.  There are no open wounds.  There are no signs of infection.  There is no evidence of lymphedema or lymphatic streaking.  The patient has supple compartments to left arm forearm and hand.  Discrete tenderness and swelling to left wrist through anatomic snuffbox      Radiology: X-rays of the left wrist show no obvious acute fracture or dislocation.  Possible subtle lucency within the scaphoid waist.      Assessment: Left wrist sprain/contusion with possible occult scaphoid fracture      Plan: Treatment options were discussed.  Patient was offered advanced imaging like MRI or CAT scan.  She declines in favor of a 2-week period of short arm thumb spica cast immobilization and strict nonweightbearing.  X-rays of the left wrist including 3 standard views  along with a fourth dedicated scaphoid view upon return to the office.  Fiberglas casting material was used to create a well-padded well molded left short arm thumb spica cast, IP free.        Procedure:

## 2024-06-17 ENCOUNTER — LAB (OUTPATIENT)
Dept: LAB | Facility: LAB | Age: 64
End: 2024-06-17
Payer: COMMERCIAL

## 2024-06-17 DIAGNOSIS — E03.9 ACQUIRED HYPOTHYROIDISM: ICD-10-CM

## 2024-06-17 LAB — TSH SERPL-ACNC: 3.15 MIU/L (ref 0.44–3.98)

## 2024-06-17 PROCEDURE — 84443 ASSAY THYROID STIM HORMONE: CPT

## 2024-06-17 PROCEDURE — 36415 COLL VENOUS BLD VENIPUNCTURE: CPT

## 2024-06-18 ENCOUNTER — OFFICE VISIT (OUTPATIENT)
Dept: ORTHOPEDIC SURGERY | Facility: CLINIC | Age: 64
End: 2024-06-18
Payer: COMMERCIAL

## 2024-06-18 ENCOUNTER — HOSPITAL ENCOUNTER (OUTPATIENT)
Dept: RADIOLOGY | Facility: CLINIC | Age: 64
Discharge: HOME | End: 2024-06-18
Payer: COMMERCIAL

## 2024-06-18 DIAGNOSIS — S63.502A SPRAIN OF LEFT WRIST, INITIAL ENCOUNTER: ICD-10-CM

## 2024-06-18 PROCEDURE — 3048F LDL-C <100 MG/DL: CPT | Performed by: ORTHOPAEDIC SURGERY

## 2024-06-18 PROCEDURE — L3908 WHO COCK-UP NONMOLDE PRE OTS: HCPCS | Performed by: ORTHOPAEDIC SURGERY

## 2024-06-18 PROCEDURE — 1159F MED LIST DOCD IN RCRD: CPT | Performed by: ORTHOPAEDIC SURGERY

## 2024-06-18 PROCEDURE — 99213 OFFICE O/P EST LOW 20 MIN: CPT | Performed by: ORTHOPAEDIC SURGERY

## 2024-06-18 PROCEDURE — 3008F BODY MASS INDEX DOCD: CPT | Performed by: ORTHOPAEDIC SURGERY

## 2024-06-18 PROCEDURE — 3051F HG A1C>EQUAL 7.0%<8.0%: CPT | Performed by: ORTHOPAEDIC SURGERY

## 2024-06-18 PROCEDURE — 73110 X-RAY EXAM OF WRIST: CPT | Mod: LT

## 2024-06-18 PROCEDURE — 1036F TOBACCO NON-USER: CPT | Performed by: ORTHOPAEDIC SURGERY

## 2024-06-18 PROCEDURE — 73110 X-RAY EXAM OF WRIST: CPT | Mod: LEFT SIDE | Performed by: ORTHOPAEDIC SURGERY

## 2024-06-18 NOTE — PROGRESS NOTES
History present illness: Patient presents today for evaluation status post injury to the left wrist.  She is localizing primarily to the thumb CMC joint.  Overall feeling much better as compared to initial visit.  She spent 2 weeks in a cast.        Physical examination:  General: Alert and oriented to person, place, and time.  No acute distress and breathing comfortably: Pleasant and cooperative with examination.  Extremities: Evaluation of the left upper extremity finds the patient had palpable radial artery at the wrist with brisk capillary refill to all digits.  Patient has intact sensation to axillary radial median and ulnar nerves.  There are no open wounds.  There are no signs of infection.  There is no evidence of lymphedema or lymphatic streaking.  The patient has supple compartments to left arm forearm and hand.  Tenderness to left thumb at CMC articulation.      Diagnostic studies: X-rays show no acute fracture or dislocation.  Thumb CMC arthritis.      Assessment: Left wrist sprain, resolving.  Left thumb CMC arthritis.      Plan: Treatment options were discussed.  Patient elects for splint immobilization range of motion exercises and observation of left thumb CMC arthritis.  The sprain to the wrist is resolving.  4-week follow-up to discuss ongoing management of any residual wrist pain or thumb CMC arthritis pain.  X-rays of wrist upon return if wrist is still painful.  No x-rays necessary of thumb if thumb is what is bringing her back to the office.      Procedure:        Procedure:

## 2024-06-21 DIAGNOSIS — I67.9 SMALL VESSEL DISEASE, CEREBROVASCULAR: Primary | ICD-10-CM

## 2024-06-21 RX ORDER — CLOPIDOGREL BISULFATE 75 MG/1
75 TABLET ORAL DAILY
Qty: 90 TABLET | Refills: 1 | Status: SHIPPED | OUTPATIENT
Start: 2024-06-21

## 2024-07-15 DIAGNOSIS — E03.9 ACQUIRED HYPOTHYROIDISM: ICD-10-CM

## 2024-07-16 ENCOUNTER — APPOINTMENT (OUTPATIENT)
Dept: ORTHOPEDIC SURGERY | Facility: CLINIC | Age: 64
End: 2024-07-16
Payer: COMMERCIAL

## 2024-07-19 RX ORDER — LEVOTHYROXINE SODIUM 25 UG/1
25 TABLET ORAL DAILY
Qty: 90 TABLET | Refills: 0 | Status: SHIPPED | OUTPATIENT
Start: 2024-07-19

## 2024-07-22 DIAGNOSIS — E03.9 ACQUIRED HYPOTHYROIDISM: ICD-10-CM

## 2024-07-22 RX ORDER — LEVOTHYROXINE SODIUM 25 UG/1
25 TABLET ORAL DAILY
Qty: 90 TABLET | Refills: 0 | Status: SHIPPED | OUTPATIENT
Start: 2024-07-22

## 2024-08-19 DIAGNOSIS — I67.9 SMALL VESSEL DISEASE, CEREBROVASCULAR: ICD-10-CM

## 2024-08-19 DIAGNOSIS — I10 PRIMARY HYPERTENSION: ICD-10-CM

## 2024-08-19 DIAGNOSIS — E78.2 MIXED HYPERLIPIDEMIA: ICD-10-CM

## 2024-08-19 DIAGNOSIS — I10 HYPERTENSION, UNSPECIFIED TYPE: ICD-10-CM

## 2024-08-19 DIAGNOSIS — F32.A DEPRESSION, UNSPECIFIED DEPRESSION TYPE: ICD-10-CM

## 2024-08-19 RX ORDER — ATORVASTATIN CALCIUM 40 MG/1
40 TABLET, FILM COATED ORAL DAILY
Qty: 90 TABLET | Refills: 3 | Status: SHIPPED | OUTPATIENT
Start: 2024-08-19

## 2024-08-19 RX ORDER — CLOPIDOGREL BISULFATE 75 MG/1
75 TABLET ORAL DAILY
Qty: 90 TABLET | Refills: 1 | Status: SHIPPED | OUTPATIENT
Start: 2024-08-19

## 2024-08-19 RX ORDER — SERTRALINE HYDROCHLORIDE 100 MG/1
100 TABLET, FILM COATED ORAL DAILY
Qty: 90 TABLET | Refills: 1 | Status: SHIPPED | OUTPATIENT
Start: 2024-08-19

## 2024-08-19 RX ORDER — HYDROCHLOROTHIAZIDE 12.5 MG/1
12.5 TABLET ORAL DAILY
Qty: 90 TABLET | Refills: 1 | Status: SHIPPED | OUTPATIENT
Start: 2024-08-19

## 2024-08-19 RX ORDER — AMLODIPINE BESYLATE 5 MG/1
5 TABLET ORAL DAILY
Qty: 90 TABLET | Refills: 3 | Status: SHIPPED | OUTPATIENT
Start: 2024-08-19

## 2024-08-19 RX ORDER — SPIRONOLACTONE 25 MG/1
25 TABLET ORAL DAILY
Qty: 90 TABLET | Refills: 1 | Status: SHIPPED | OUTPATIENT
Start: 2024-08-19

## 2024-10-08 ENCOUNTER — APPOINTMENT (OUTPATIENT)
Dept: PRIMARY CARE | Facility: CLINIC | Age: 64
End: 2024-10-08
Payer: COMMERCIAL

## 2024-10-17 ENCOUNTER — APPOINTMENT (OUTPATIENT)
Dept: PRIMARY CARE | Facility: CLINIC | Age: 64
End: 2024-10-17
Payer: COMMERCIAL

## 2024-10-17 VITALS
HEART RATE: 70 BPM | TEMPERATURE: 97.8 F | DIASTOLIC BLOOD PRESSURE: 80 MMHG | SYSTOLIC BLOOD PRESSURE: 118 MMHG | WEIGHT: 200.8 LBS | BODY MASS INDEX: 36.95 KG/M2 | HEIGHT: 62 IN | OXYGEN SATURATION: 97 %

## 2024-10-17 DIAGNOSIS — H65.93 BILATERAL OTITIS MEDIA WITH EFFUSION: ICD-10-CM

## 2024-10-17 DIAGNOSIS — B37.83 ANGULAR CHEILITIS WITH CANDIDIASIS: ICD-10-CM

## 2024-10-17 DIAGNOSIS — F32.A DEPRESSION, UNSPECIFIED DEPRESSION TYPE: ICD-10-CM

## 2024-10-17 DIAGNOSIS — R09.81 SINUS CONGESTION: ICD-10-CM

## 2024-10-17 DIAGNOSIS — E11.65 TYPE 2 DIABETES MELLITUS WITH HYPERGLYCEMIA, WITHOUT LONG-TERM CURRENT USE OF INSULIN: Primary | ICD-10-CM

## 2024-10-17 DIAGNOSIS — R59.1 LYMPHADENOPATHY: ICD-10-CM

## 2024-10-17 DIAGNOSIS — Z12.31 BREAST CANCER SCREENING BY MAMMOGRAM: ICD-10-CM

## 2024-10-17 LAB — POC HEMOGLOBIN A1C: 7.1 % (ref 4.2–6.5)

## 2024-10-17 PROCEDURE — 3008F BODY MASS INDEX DOCD: CPT | Performed by: FAMILY MEDICINE

## 2024-10-17 PROCEDURE — 1124F ACP DISCUSS-NO DSCNMKR DOCD: CPT | Performed by: FAMILY MEDICINE

## 2024-10-17 PROCEDURE — 83036 HEMOGLOBIN GLYCOSYLATED A1C: CPT | Performed by: FAMILY MEDICINE

## 2024-10-17 PROCEDURE — 3074F SYST BP LT 130 MM HG: CPT | Performed by: FAMILY MEDICINE

## 2024-10-17 PROCEDURE — 1159F MED LIST DOCD IN RCRD: CPT | Performed by: FAMILY MEDICINE

## 2024-10-17 PROCEDURE — 3051F HG A1C>EQUAL 7.0%<8.0%: CPT | Performed by: FAMILY MEDICINE

## 2024-10-17 PROCEDURE — 1036F TOBACCO NON-USER: CPT | Performed by: FAMILY MEDICINE

## 2024-10-17 PROCEDURE — 99214 OFFICE O/P EST MOD 30 MIN: CPT | Performed by: FAMILY MEDICINE

## 2024-10-17 PROCEDURE — 3048F LDL-C <100 MG/DL: CPT | Performed by: FAMILY MEDICINE

## 2024-10-17 PROCEDURE — 3079F DIAST BP 80-89 MM HG: CPT | Performed by: FAMILY MEDICINE

## 2024-10-17 RX ORDER — CLOTRIMAZOLE AND BETAMETHASONE DIPROPIONATE 10; .64 MG/G; MG/G
CREAM TOPICAL
Qty: 15 G | Refills: 0 | Status: SHIPPED | OUTPATIENT
Start: 2024-10-17

## 2024-10-17 RX ORDER — SERTRALINE HYDROCHLORIDE 50 MG/1
50 TABLET, FILM COATED ORAL DAILY
Qty: 30 TABLET | Refills: 0 | Status: SHIPPED | OUTPATIENT
Start: 2024-10-17 | End: 2024-12-16

## 2024-10-17 RX ORDER — CARBOXYMETHYLCELLULOSE SODIUM, GLYCERIN, AND POLYSORBATE 80 5; 10; 5 MG/ML; MG/ML; MG/ML
2 SOLUTION/ DROPS OPHTHALMIC DAILY
COMMUNITY
Start: 2024-08-16

## 2024-10-17 RX ORDER — GLIPIZIDE 5 MG/1
5 TABLET, FILM COATED, EXTENDED RELEASE ORAL DAILY
Qty: 30 TABLET | Refills: 5 | Status: SHIPPED | OUTPATIENT
Start: 2024-10-17 | End: 2025-10-17

## 2024-10-17 ASSESSMENT — ENCOUNTER SYMPTOMS
OCCASIONAL FEELINGS OF UNSTEADINESS: 0
LOSS OF SENSATION IN FEET: 1
DEPRESSION: 0

## 2024-10-17 NOTE — ASSESSMENT & PLAN NOTE
Symptoms have been present for 1 week.  Will monitor at this time to see if it resolves over the next 2 weeks.  Suspect it is a reactive lymphadenopathy.

## 2024-10-17 NOTE — ASSESSMENT & PLAN NOTE
Patient would like to cut down on her sertraline.  She is planning to cut her 100 mg pill in half.

## 2024-10-17 NOTE — ASSESSMENT & PLAN NOTE
Hemoglobin A1c today is 7.1%.  She is unable to tolerate metformin.  Will start glipizide 5 mg daily.

## 2024-10-17 NOTE — PROGRESS NOTES
"Subjective   Chief complaint: Lalita Dumont is a 64 y.o. female who presents for Follow-up (PATIENT HERE FOR 6 MONTH FOLLOW-UP. REVIEW TEST RESULTS. ).    HPI:  Patient is here for follow-up appointment.  She has a few questions today.  She would like to have her ears checked because she has been getting episodes of pressure in her ears with intermittent sharp, fleeting pain in the front of her ears bilaterally.  Does not last.  She admits that she does have some sinus congestion today.  Also reports that she has been getting some neck pain under her chin which has been going on for the last week.  Would like to schedule a Pap exam.  Has been a year since she has had one.  Reports that she has stopped going to her rheumatologist, Dr. Felton, because she only makes $800 dollars a month and her co-pay to see a specialist is $40.  She sees cardiology.  She has been seen by rheumatology for her fibromyalgia, lupus, rheumatoid and osteoarthritis.  She is currently on Plaquenil but would like to stop it and see how she does.  She also has a sore at the corners of both sides of her mouth.  Has not improved.  Finally, she is reporting that she would like to cut her sertraline down she would like to taper down and asks if she can cut her 100 mg tablet in half..  She has been on this medication for a long time.        Objective   /80 (BP Location: Left arm, Patient Position: Sitting, BP Cuff Size: Adult)   Pulse 70   Temp 36.6 °C (97.8 °F) (Temporal)   Ht 1.575 m (5' 2\")   Wt 91.1 kg (200 lb 12.8 oz)   SpO2 97%   BMI 36.73 kg/m²   Physical Exam  General:  Alert, oriented, no acute distress  Eyes:  Sclerae white, PER, conjunctivae clear  ENT:  No nasal congestion.    Neck: Supple, tenderness at the submental nodes.    Endocrine:  No thyromegaly.  Respiratory:  Normal breath sounds.  No wheezing, rhonchi nor crackles.  No dyspnea.  Cardiovascular:  S1 and S2 positive.  Regular rate and rhythm.  No gallops.  No " murmurs.  Vascular:  No edema.  Skin warm and dry.  CNS:  No gross neurological deficits.  Gait within normal limits.    Psychiatric:  Affect is positive and appropriate.  No depression.  No anxiety.   Review of Systems   I have reviewed and reconciled the medication list with the patient today.   Current Outpatient Medications:     acetaminophen (TylenoL) 325 mg tablet, Take 2 tablets (650 mg) by mouth every 4 hours if needed., Disp: , Rfl:     albuterol 90 mcg/actuation inhaler, Inhale 2 puffs every 6 hours if needed., Disp: , Rfl:     amLODIPine (Norvasc) 5 mg tablet, Take 1 tablet (5 mg) by mouth once daily., Disp: 90 tablet, Rfl: 3    aspirin 81 mg EC tablet, Take 1 tablet (81 mg) by mouth once daily., Disp: , Rfl:     atorvastatin (Lipitor) 40 mg tablet, Take 1 tablet (40 mg) by mouth once daily., Disp: 90 tablet, Rfl: 3    cholecalciferol (Vitamin D-3) 25 MCG (1000 UT) tablet, Take 2 tablets (2,000 Units) by mouth 2 times a day., Disp: , Rfl:     cyanocobalamin (Vitamin B-12) 1,000 mcg tablet, Take 1 tablet (1,000 mcg) by mouth once daily., Disp: , Rfl:     hydroCHLOROthiazide (Microzide) 12.5 mg tablet, Take 1 tablet (12.5 mg) by mouth once daily., Disp: 90 tablet, Rfl: 1    ipratropium-albuteroL (Duo-Neb) 0.5-2.5 mg/3 mL nebulizer solution, Take 3 mL by nebulization every 4 hours if needed for wheezing., Disp: , Rfl:     levothyroxine (Synthroid, Levoxyl) 25 mcg tablet, Take 1 tablet (25 mcg) by mouth once daily., Disp: 90 tablet, Rfl: 0    nitroglycerin (Nitrostat) 0.4 mg SL tablet, Place 1 tablet (0.4 mg) under the tongue every 5 minutes if needed for chest pain., Disp: 90 tablet, Rfl: 0    PlaqueniL 200 mg tablet, Take 2 tablets (400 mg) by mouth once daily., Disp: , Rfl:     Refresh Optive Advanced 0.5-1-0.5 % drops, Administer 2 drops into both eyes once daily., Disp: , Rfl:     spironolactone (Aldactone) 25 mg tablet, Take 1 tablet (25 mg) by mouth once daily., Disp: 90 tablet, Rfl: 1    clopidogrel  (Plavix) 75 mg tablet, Take 1 tablet (75 mg) by mouth once daily. (Patient not taking: Reported on 10/17/2024), Disp: 90 tablet, Rfl: 1    clotrimazole-betamethasone (Lotrisone) cream, Apply sparingly to corners of mouth twice a day for up to 2 weeks.  Avoid ingestion., Disp: 15 g, Rfl: 0    glipiZIDE XL (Glucotrol XL) 5 mg 24 hr tablet, Take 1 tablet (5 mg) by mouth once daily. Do not crush, chew, or split., Disp: 30 tablet, Rfl: 5    sertraline (Zoloft) 50 mg tablet, Take 1 tablet (50 mg) by mouth once daily., Disp: 30 tablet, Rfl: 0     Imaging:  No results found.     Labs reviewed:    Lab Results   Component Value Date    WBC 4.3 (L) 04/05/2024    HGB 14.4 04/05/2024    HCT 44.2 04/05/2024     04/05/2024    CHOL 171 04/05/2024    TRIG 214 (H) 04/05/2024    HDL 47.5 04/05/2024    ALT 21 04/05/2024    AST 22 04/05/2024     04/05/2024    K 4.1 04/05/2024     04/05/2024    CREATININE 0.87 04/05/2024    BUN 20 04/05/2024    CO2 29 04/05/2024    TSH 3.15 06/17/2024    INR 1.1 10/09/2023    HGBA1C 7.1 (A) 10/17/2024       Assessment/Plan   Problem List Items Addressed This Visit       Angular cheilitis with candidiasis     Prescription ordered         Relevant Medications    clotrimazole-betamethasone (Lotrisone) cream    Bilateral otitis media with effusion     Discussed with patient.  Likely causing her ear pressure and discomfort.         Depression     Patient would like to cut down on her sertraline.  She is planning to cut her 100 mg pill in half.         Relevant Medications    sertraline (Zoloft) 50 mg tablet    Lymphadenopathy     Symptoms have been present for 1 week.  Will monitor at this time to see if it resolves over the next 2 weeks.  Suspect it is a reactive lymphadenopathy.         Sinus congestion    Type 2 diabetes mellitus with hyperglycemia (Multi) - Primary     Hemoglobin A1c today is 7.1%.  She is unable to tolerate metformin.  Will start glipizide 5 mg daily.         Relevant  Medications    glipiZIDE XL (Glucotrol XL) 5 mg 24 hr tablet    Other Relevant Orders    POCT glycosylated hemoglobin (Hb A1C) manually resulted (Completed)     Other Visit Diagnoses       Breast cancer screening by mammogram        Relevant Orders    BI mammo bilateral screening tomosynthesis            Continue current medications as listed  Follow up in 3 months.  Call sooner as needed.

## 2024-10-18 ENCOUNTER — APPOINTMENT (OUTPATIENT)
Dept: PRIMARY CARE | Facility: CLINIC | Age: 64
End: 2024-10-18
Payer: COMMERCIAL

## 2024-10-24 ENCOUNTER — APPOINTMENT (OUTPATIENT)
Dept: PRIMARY CARE | Facility: CLINIC | Age: 64
End: 2024-10-24
Payer: COMMERCIAL

## 2024-10-24 VITALS
SYSTOLIC BLOOD PRESSURE: 120 MMHG | BODY MASS INDEX: 36.73 KG/M2 | DIASTOLIC BLOOD PRESSURE: 72 MMHG | TEMPERATURE: 98 F | WEIGHT: 199.6 LBS | HEIGHT: 62 IN | HEART RATE: 78 BPM | OXYGEN SATURATION: 96 %

## 2024-10-24 DIAGNOSIS — Z12.4 CERVICAL CANCER SCREENING: Primary | ICD-10-CM

## 2024-10-24 PROCEDURE — 87624 HPV HI-RISK TYP POOLED RSLT: CPT

## 2024-10-24 NOTE — PROGRESS NOTES
Patient presented today for Pap smear. Verbal consent was obtained from the patient after I explained the procedure to her.   Last PAP was done couple yrs ago, she has had hx of HPV infection    Pelvic exam:external   -Genitalia normal, normal Bartholin's glands, urethra,  no vulvar lesions, no cervical lesions, good vaginal support, physiologic discharge present, normal appearing perineal body and perianal region  Speculum was inserted, normal looking vagina,   Cervix was brushed.  No complications.

## 2024-11-01 LAB
CYTOLOGY CMNT CVX/VAG CYTO-IMP: NORMAL
HPV HR 12 DNA GENITAL QL NAA+PROBE: NEGATIVE
HPV HR GENOTYPES PNL CVX NAA+PROBE: NEGATIVE
HPV16 DNA SPEC QL NAA+PROBE: NEGATIVE
HPV18 DNA SPEC QL NAA+PROBE: NEGATIVE
LAB AP CONTRACEPTIVE HISTORY: NORMAL
LAB AP HPV GENOTYPE QUESTION: YES
LAB AP HPV HR: NORMAL
LABORATORY COMMENT REPORT: NORMAL
PATH REPORT.TOTAL CANCER: NORMAL

## 2025-01-08 ENCOUNTER — APPOINTMENT (OUTPATIENT)
Dept: CARDIOLOGY | Facility: HOSPITAL | Age: 65
End: 2025-01-08
Payer: COMMERCIAL

## 2025-01-08 ENCOUNTER — HOSPITAL ENCOUNTER (OUTPATIENT)
Facility: HOSPITAL | Age: 65
Setting detail: OBSERVATION
Discharge: HOME | End: 2025-01-10
Attending: STUDENT IN AN ORGANIZED HEALTH CARE EDUCATION/TRAINING PROGRAM | Admitting: INTERNAL MEDICINE
Payer: COMMERCIAL

## 2025-01-08 ENCOUNTER — APPOINTMENT (OUTPATIENT)
Dept: RADIOLOGY | Facility: HOSPITAL | Age: 65
End: 2025-01-08
Payer: COMMERCIAL

## 2025-01-08 DIAGNOSIS — Z98.61 CAD S/P PERCUTANEOUS CORONARY ANGIOPLASTY: ICD-10-CM

## 2025-01-08 DIAGNOSIS — R07.89 OTHER CHEST PAIN: ICD-10-CM

## 2025-01-08 DIAGNOSIS — I25.10 CAD S/P PERCUTANEOUS CORONARY ANGIOPLASTY: ICD-10-CM

## 2025-01-08 DIAGNOSIS — I20.0 ANGINA PECTORIS, UNSTABLE (MULTI): ICD-10-CM

## 2025-01-08 DIAGNOSIS — I20.9 ANGINA PECTORIS, UNSPECIFIED: ICD-10-CM

## 2025-01-08 DIAGNOSIS — R07.9 CHEST PAIN, UNSPECIFIED TYPE: Primary | ICD-10-CM

## 2025-01-08 LAB
ALBUMIN SERPL BCP-MCNC: 4.6 G/DL (ref 3.4–5)
ALP SERPL-CCNC: 59 U/L (ref 33–136)
ALT SERPL W P-5'-P-CCNC: 22 U/L (ref 7–45)
ANION GAP SERPL CALC-SCNC: 15 MMOL/L (ref 10–20)
AST SERPL W P-5'-P-CCNC: 24 U/L (ref 9–39)
ATRIAL RATE: 81 BPM
ATRIAL RATE: 89 BPM
BASOPHILS # BLD AUTO: 0.02 X10*3/UL (ref 0–0.1)
BASOPHILS NFR BLD AUTO: 0.4 %
BILIRUB SERPL-MCNC: 1 MG/DL (ref 0–1.2)
BUN SERPL-MCNC: 23 MG/DL (ref 6–23)
CALCIUM SERPL-MCNC: 9.9 MG/DL (ref 8.6–10.3)
CARDIAC TROPONIN I PNL SERPL HS: 3 NG/L (ref 0–13)
CARDIAC TROPONIN I PNL SERPL HS: 4 NG/L (ref 0–13)
CARDIAC TROPONIN I PNL SERPL HS: 4 NG/L (ref 0–13)
CHLORIDE SERPL-SCNC: 101 MMOL/L (ref 98–107)
CO2 SERPL-SCNC: 24 MMOL/L (ref 21–32)
CREAT SERPL-MCNC: 0.93 MG/DL (ref 0.5–1.05)
D DIMER PPP FEU-MCNC: 579 NG/ML FEU
EGFRCR SERPLBLD CKD-EPI 2021: 68 ML/MIN/1.73M*2
EOSINOPHIL # BLD AUTO: 0.08 X10*3/UL (ref 0–0.7)
EOSINOPHIL NFR BLD AUTO: 1.6 %
ERYTHROCYTE [DISTWIDTH] IN BLOOD BY AUTOMATED COUNT: 12.5 % (ref 11.5–14.5)
FLUAV RNA RESP QL NAA+PROBE: NOT DETECTED
FLUBV RNA RESP QL NAA+PROBE: NOT DETECTED
GLUCOSE SERPL-MCNC: 163 MG/DL (ref 74–99)
HCT VFR BLD AUTO: 43.1 % (ref 36–46)
HGB BLD-MCNC: 14.5 G/DL (ref 12–16)
IMM GRANULOCYTES # BLD AUTO: 0.01 X10*3/UL (ref 0–0.7)
IMM GRANULOCYTES NFR BLD AUTO: 0.2 % (ref 0–0.9)
LIPASE SERPL-CCNC: 72 U/L (ref 9–82)
LYMPHOCYTES # BLD AUTO: 1.34 X10*3/UL (ref 1.2–4.8)
LYMPHOCYTES NFR BLD AUTO: 26.7 %
MCH RBC QN AUTO: 29.4 PG (ref 26–34)
MCHC RBC AUTO-ENTMCNC: 33.6 G/DL (ref 32–36)
MCV RBC AUTO: 87 FL (ref 80–100)
MONOCYTES # BLD AUTO: 0.59 X10*3/UL (ref 0.1–1)
MONOCYTES NFR BLD AUTO: 11.8 %
NEUTROPHILS # BLD AUTO: 2.97 X10*3/UL (ref 1.2–7.7)
NEUTROPHILS NFR BLD AUTO: 59.3 %
NRBC BLD-RTO: 0 /100 WBCS (ref 0–0)
P AXIS: 73 DEGREES
P AXIS: 77 DEGREES
P OFFSET: 151 MS
P OFFSET: 175 MS
P ONSET: 115 MS
P ONSET: 142 MS
PLATELET # BLD AUTO: 192 X10*3/UL (ref 150–450)
POTASSIUM SERPL-SCNC: 3.7 MMOL/L (ref 3.5–5.3)
PR INTERVAL: 178 MS
PR INTERVAL: 182 MS
PROT SERPL-MCNC: 8 G/DL (ref 6.4–8.2)
Q ONSET: 206 MS
Q ONSET: 231 MS
QRS COUNT: 14 BEATS
QRS COUNT: 15 BEATS
QRS DURATION: 158 MS
QRS DURATION: 170 MS
QT INTERVAL: 414 MS
QT INTERVAL: 444 MS
QTC CALCULATION(BAZETT): 503 MS
QTC CALCULATION(BAZETT): 515 MS
QTC FREDERICIA: 471 MS
QTC FREDERICIA: 490 MS
R AXIS: -30 DEGREES
R AXIS: -35 DEGREES
RBC # BLD AUTO: 4.94 X10*6/UL (ref 4–5.2)
RSV RNA RESP QL NAA+PROBE: NOT DETECTED
SARS-COV-2 RNA RESP QL NAA+PROBE: NOT DETECTED
SODIUM SERPL-SCNC: 136 MMOL/L (ref 136–145)
T AXIS: 107 DEGREES
T AXIS: 90 DEGREES
T OFFSET: 428 MS
T OFFSET: 438 MS
VENTRICULAR RATE: 81 BPM
VENTRICULAR RATE: 89 BPM
WBC # BLD AUTO: 5 X10*3/UL (ref 4.4–11.3)

## 2025-01-08 PROCEDURE — 85379 FIBRIN DEGRADATION QUANT: CPT | Performed by: STUDENT IN AN ORGANIZED HEALTH CARE EDUCATION/TRAINING PROGRAM

## 2025-01-08 PROCEDURE — 83036 HEMOGLOBIN GLYCOSYLATED A1C: CPT | Mod: ELYLAB | Performed by: INTERNAL MEDICINE

## 2025-01-08 PROCEDURE — 71045 X-RAY EXAM CHEST 1 VIEW: CPT | Performed by: RADIOLOGY

## 2025-01-08 PROCEDURE — 71275 CT ANGIOGRAPHY CHEST: CPT

## 2025-01-08 PROCEDURE — 2500000004 HC RX 250 GENERAL PHARMACY W/ HCPCS (ALT 636 FOR OP/ED): Performed by: INTERNAL MEDICINE

## 2025-01-08 PROCEDURE — 96374 THER/PROPH/DIAG INJ IV PUSH: CPT | Mod: 59

## 2025-01-08 PROCEDURE — 84484 ASSAY OF TROPONIN QUANT: CPT | Performed by: STUDENT IN AN ORGANIZED HEALTH CARE EDUCATION/TRAINING PROGRAM

## 2025-01-08 PROCEDURE — 87637 SARSCOV2&INF A&B&RSV AMP PRB: CPT | Performed by: STUDENT IN AN ORGANIZED HEALTH CARE EDUCATION/TRAINING PROGRAM

## 2025-01-08 PROCEDURE — 96372 THER/PROPH/DIAG INJ SC/IM: CPT | Performed by: INTERNAL MEDICINE

## 2025-01-08 PROCEDURE — 36415 COLL VENOUS BLD VENIPUNCTURE: CPT | Performed by: STUDENT IN AN ORGANIZED HEALTH CARE EDUCATION/TRAINING PROGRAM

## 2025-01-08 PROCEDURE — 93005 ELECTROCARDIOGRAM TRACING: CPT

## 2025-01-08 PROCEDURE — 2500000004 HC RX 250 GENERAL PHARMACY W/ HCPCS (ALT 636 FOR OP/ED): Performed by: STUDENT IN AN ORGANIZED HEALTH CARE EDUCATION/TRAINING PROGRAM

## 2025-01-08 PROCEDURE — 85025 COMPLETE CBC W/AUTO DIFF WBC: CPT | Performed by: STUDENT IN AN ORGANIZED HEALTH CARE EDUCATION/TRAINING PROGRAM

## 2025-01-08 PROCEDURE — 71275 CT ANGIOGRAPHY CHEST: CPT | Performed by: RADIOLOGY

## 2025-01-08 PROCEDURE — 71045 X-RAY EXAM CHEST 1 VIEW: CPT

## 2025-01-08 PROCEDURE — 2550000001 HC RX 255 CONTRASTS: Performed by: STUDENT IN AN ORGANIZED HEALTH CARE EDUCATION/TRAINING PROGRAM

## 2025-01-08 PROCEDURE — 84484 ASSAY OF TROPONIN QUANT: CPT | Performed by: INTERNAL MEDICINE

## 2025-01-08 PROCEDURE — 99285 EMERGENCY DEPT VISIT HI MDM: CPT | Mod: 25 | Performed by: STUDENT IN AN ORGANIZED HEALTH CARE EDUCATION/TRAINING PROGRAM

## 2025-01-08 PROCEDURE — 2500000005 HC RX 250 GENERAL PHARMACY W/O HCPCS: Performed by: STUDENT IN AN ORGANIZED HEALTH CARE EDUCATION/TRAINING PROGRAM

## 2025-01-08 PROCEDURE — 2500000001 HC RX 250 WO HCPCS SELF ADMINISTERED DRUGS (ALT 637 FOR MEDICARE OP): Performed by: INTERNAL MEDICINE

## 2025-01-08 PROCEDURE — 96375 TX/PRO/DX INJ NEW DRUG ADDON: CPT | Mod: 59

## 2025-01-08 PROCEDURE — G0378 HOSPITAL OBSERVATION PER HR: HCPCS

## 2025-01-08 PROCEDURE — 2500000001 HC RX 250 WO HCPCS SELF ADMINISTERED DRUGS (ALT 637 FOR MEDICARE OP): Performed by: STUDENT IN AN ORGANIZED HEALTH CARE EDUCATION/TRAINING PROGRAM

## 2025-01-08 PROCEDURE — 84075 ASSAY ALKALINE PHOSPHATASE: CPT | Performed by: STUDENT IN AN ORGANIZED HEALTH CARE EDUCATION/TRAINING PROGRAM

## 2025-01-08 PROCEDURE — 83690 ASSAY OF LIPASE: CPT | Performed by: STUDENT IN AN ORGANIZED HEALTH CARE EDUCATION/TRAINING PROGRAM

## 2025-01-08 RX ORDER — VIT C/E/ZN/COPPR/LUTEIN/ZEAXAN 250MG-90MG
1000 CAPSULE ORAL DAILY
Status: DISCONTINUED | OUTPATIENT
Start: 2025-01-09 | End: 2025-01-10 | Stop reason: HOSPADM

## 2025-01-08 RX ORDER — FAMOTIDINE 10 MG/ML
20 INJECTION INTRAVENOUS ONCE
Status: COMPLETED | OUTPATIENT
Start: 2025-01-08 | End: 2025-01-08

## 2025-01-08 RX ORDER — ONDANSETRON HYDROCHLORIDE 2 MG/ML
4 INJECTION, SOLUTION INTRAVENOUS EVERY 8 HOURS PRN
Status: DISCONTINUED | OUTPATIENT
Start: 2025-01-08 | End: 2025-01-10 | Stop reason: HOSPADM

## 2025-01-08 RX ORDER — DEXTROSE 50 % IN WATER (D50W) INTRAVENOUS SYRINGE
25
Status: DISCONTINUED | OUTPATIENT
Start: 2025-01-08 | End: 2025-01-10 | Stop reason: HOSPADM

## 2025-01-08 RX ORDER — IPRATROPIUM BROMIDE AND ALBUTEROL SULFATE 2.5; .5 MG/3ML; MG/3ML
3 SOLUTION RESPIRATORY (INHALATION) EVERY 4 HOURS PRN
Status: DISCONTINUED | OUTPATIENT
Start: 2025-01-08 | End: 2025-01-10 | Stop reason: HOSPADM

## 2025-01-08 RX ORDER — ONDANSETRON HYDROCHLORIDE 2 MG/ML
4 INJECTION, SOLUTION INTRAVENOUS ONCE
Status: COMPLETED | OUTPATIENT
Start: 2025-01-08 | End: 2025-01-08

## 2025-01-08 RX ORDER — NITROGLYCERIN 0.4 MG/1
0.4 TABLET SUBLINGUAL EVERY 5 MIN PRN
Status: DISCONTINUED | OUTPATIENT
Start: 2025-01-08 | End: 2025-01-10 | Stop reason: HOSPADM

## 2025-01-08 RX ORDER — PANTOPRAZOLE SODIUM 40 MG/1
40 TABLET, DELAYED RELEASE ORAL
Status: DISCONTINUED | OUTPATIENT
Start: 2025-01-09 | End: 2025-01-08

## 2025-01-08 RX ORDER — PANTOPRAZOLE SODIUM 40 MG/10ML
40 INJECTION, POWDER, LYOPHILIZED, FOR SOLUTION INTRAVENOUS
Status: DISCONTINUED | OUTPATIENT
Start: 2025-01-09 | End: 2025-01-08

## 2025-01-08 RX ORDER — ACETAMINOPHEN 325 MG/1
650 TABLET ORAL EVERY 4 HOURS PRN
Status: DISCONTINUED | OUTPATIENT
Start: 2025-01-08 | End: 2025-01-10 | Stop reason: HOSPADM

## 2025-01-08 RX ORDER — LEVOTHYROXINE SODIUM 25 UG/1
25 TABLET ORAL DAILY
Status: DISCONTINUED | OUTPATIENT
Start: 2025-01-09 | End: 2025-01-10 | Stop reason: HOSPADM

## 2025-01-08 RX ORDER — GUAIFENESIN/DEXTROMETHORPHAN 100-10MG/5
5 SYRUP ORAL EVERY 4 HOURS PRN
Status: DISCONTINUED | OUTPATIENT
Start: 2025-01-08 | End: 2025-01-10 | Stop reason: HOSPADM

## 2025-01-08 RX ORDER — DEXTROSE 50 % IN WATER (D50W) INTRAVENOUS SYRINGE
12.5
Status: DISCONTINUED | OUTPATIENT
Start: 2025-01-08 | End: 2025-01-10 | Stop reason: HOSPADM

## 2025-01-08 RX ORDER — ATORVASTATIN CALCIUM 20 MG/1
40 TABLET, FILM COATED ORAL NIGHTLY
Status: DISCONTINUED | OUTPATIENT
Start: 2025-01-08 | End: 2025-01-10 | Stop reason: HOSPADM

## 2025-01-08 RX ORDER — AMLODIPINE BESYLATE 5 MG/1
5 TABLET ORAL DAILY
Status: DISCONTINUED | OUTPATIENT
Start: 2025-01-09 | End: 2025-01-10 | Stop reason: HOSPADM

## 2025-01-08 RX ORDER — ACETAMINOPHEN 650 MG/1
650 SUPPOSITORY RECTAL EVERY 4 HOURS PRN
Status: DISCONTINUED | OUTPATIENT
Start: 2025-01-08 | End: 2025-01-10 | Stop reason: HOSPADM

## 2025-01-08 RX ORDER — NAPROXEN SODIUM 220 MG/1
324 TABLET, FILM COATED ORAL ONCE
Status: COMPLETED | OUTPATIENT
Start: 2025-01-08 | End: 2025-01-08

## 2025-01-08 RX ORDER — HYDROCHLOROTHIAZIDE 12.5 MG/1
12.5 TABLET ORAL DAILY
Status: DISCONTINUED | OUTPATIENT
Start: 2025-01-09 | End: 2025-01-10 | Stop reason: HOSPADM

## 2025-01-08 RX ORDER — FAMOTIDINE 10 MG/ML
20 INJECTION INTRAVENOUS 2 TIMES DAILY
Status: DISCONTINUED | OUTPATIENT
Start: 2025-01-08 | End: 2025-01-10 | Stop reason: HOSPADM

## 2025-01-08 RX ORDER — MORPHINE SULFATE 2 MG/ML
2 INJECTION, SOLUTION INTRAMUSCULAR; INTRAVENOUS ONCE
Status: COMPLETED | OUTPATIENT
Start: 2025-01-08 | End: 2025-01-08

## 2025-01-08 RX ORDER — INSULIN LISPRO 100 [IU]/ML
0-20 INJECTION, SOLUTION INTRAVENOUS; SUBCUTANEOUS
Status: DISCONTINUED | OUTPATIENT
Start: 2025-01-09 | End: 2025-01-10 | Stop reason: HOSPADM

## 2025-01-08 RX ORDER — SERTRALINE HYDROCHLORIDE 50 MG/1
50 TABLET, FILM COATED ORAL DAILY
Status: DISCONTINUED | OUTPATIENT
Start: 2025-01-09 | End: 2025-01-10 | Stop reason: HOSPADM

## 2025-01-08 RX ORDER — ALUMINUM HYDROXIDE, MAGNESIUM HYDROXIDE, AND SIMETHICONE 1200; 120; 1200 MG/30ML; MG/30ML; MG/30ML
30 SUSPENSION ORAL ONCE
Status: COMPLETED | OUTPATIENT
Start: 2025-01-08 | End: 2025-01-08

## 2025-01-08 RX ORDER — ACETAMINOPHEN 160 MG/5ML
650 SOLUTION ORAL EVERY 4 HOURS PRN
Status: DISCONTINUED | OUTPATIENT
Start: 2025-01-08 | End: 2025-01-10 | Stop reason: HOSPADM

## 2025-01-08 RX ORDER — AMOXICILLIN 250 MG
2 CAPSULE ORAL 2 TIMES DAILY PRN
Status: DISCONTINUED | OUTPATIENT
Start: 2025-01-08 | End: 2025-01-10 | Stop reason: HOSPADM

## 2025-01-08 RX ORDER — FAMOTIDINE 20 MG/1
20 TABLET, FILM COATED ORAL 2 TIMES DAILY
Status: DISCONTINUED | OUTPATIENT
Start: 2025-01-08 | End: 2025-01-10 | Stop reason: HOSPADM

## 2025-01-08 RX ORDER — ALBUTEROL SULFATE 90 UG/1
2 INHALANT RESPIRATORY (INHALATION) EVERY 6 HOURS PRN
Status: DISCONTINUED | OUTPATIENT
Start: 2025-01-08 | End: 2025-01-10 | Stop reason: HOSPADM

## 2025-01-08 RX ORDER — GLIPIZIDE 2.5 MG/1
5 TABLET, EXTENDED RELEASE ORAL DAILY
Status: DISCONTINUED | OUTPATIENT
Start: 2025-01-09 | End: 2025-01-10 | Stop reason: HOSPADM

## 2025-01-08 RX ORDER — CHOLECALCIFEROL (VITAMIN D3) 25 MCG
2000 TABLET ORAL DAILY
Status: DISCONTINUED | OUTPATIENT
Start: 2025-01-09 | End: 2025-01-10 | Stop reason: HOSPADM

## 2025-01-08 RX ORDER — LIDOCAINE HYDROCHLORIDE 20 MG/ML
15 SOLUTION OROPHARYNGEAL ONCE
Status: COMPLETED | OUTPATIENT
Start: 2025-01-08 | End: 2025-01-08

## 2025-01-08 RX ORDER — CLOPIDOGREL BISULFATE 75 MG/1
75 TABLET ORAL DAILY
Status: DISCONTINUED | OUTPATIENT
Start: 2025-01-09 | End: 2025-01-10 | Stop reason: HOSPADM

## 2025-01-08 RX ORDER — ASPIRIN 81 MG/1
81 TABLET ORAL DAILY
Status: DISCONTINUED | OUTPATIENT
Start: 2025-01-09 | End: 2025-01-10 | Stop reason: HOSPADM

## 2025-01-08 RX ORDER — SPIRONOLACTONE 25 MG/1
25 TABLET ORAL DAILY
Status: DISCONTINUED | OUTPATIENT
Start: 2025-01-09 | End: 2025-01-10 | Stop reason: HOSPADM

## 2025-01-08 RX ORDER — ONDANSETRON 4 MG/1
4 TABLET, FILM COATED ORAL EVERY 8 HOURS PRN
Status: DISCONTINUED | OUTPATIENT
Start: 2025-01-08 | End: 2025-01-10 | Stop reason: HOSPADM

## 2025-01-08 RX ORDER — HEPARIN SODIUM 5000 [USP'U]/ML
5000 INJECTION, SOLUTION INTRAVENOUS; SUBCUTANEOUS EVERY 8 HOURS
Status: DISCONTINUED | OUTPATIENT
Start: 2025-01-08 | End: 2025-01-10 | Stop reason: HOSPADM

## 2025-01-08 RX ADMIN — ONDANSETRON 4 MG: 2 INJECTION INTRAMUSCULAR; INTRAVENOUS at 18:14

## 2025-01-08 RX ADMIN — HEPARIN SODIUM 5000 UNITS: 5000 INJECTION INTRAVENOUS; SUBCUTANEOUS at 23:13

## 2025-01-08 RX ADMIN — ALUMINUM HYDROXIDE, MAGNESIUM HYDROXIDE, AND SIMETHICONE 30 ML: 200; 200; 20 SUSPENSION ORAL at 18:02

## 2025-01-08 RX ADMIN — LIDOCAINE HYDROCHLORIDE 15 ML: 20 SOLUTION ORAL at 18:02

## 2025-01-08 RX ADMIN — ASPIRIN 324 MG: 81 TABLET, CHEWABLE ORAL at 20:25

## 2025-01-08 RX ADMIN — MORPHINE SULFATE 2 MG: 2 INJECTION, SOLUTION INTRAMUSCULAR; INTRAVENOUS at 18:14

## 2025-01-08 RX ADMIN — ATORVASTATIN CALCIUM 40 MG: 20 TABLET, FILM COATED ORAL at 23:13

## 2025-01-08 RX ADMIN — IOHEXOL 75 ML: 350 INJECTION, SOLUTION INTRAVENOUS at 19:02

## 2025-01-08 RX ADMIN — FAMOTIDINE 20 MG: 10 INJECTION, SOLUTION INTRAVENOUS at 19:07

## 2025-01-08 SDOH — SOCIAL STABILITY: SOCIAL INSECURITY
WITHIN THE LAST YEAR, HAVE YOU BEEN RAPED OR FORCED TO HAVE ANY KIND OF SEXUAL ACTIVITY BY YOUR PARTNER OR EX-PARTNER?: NO

## 2025-01-08 SDOH — SOCIAL STABILITY: SOCIAL INSECURITY: HAS ANYONE EVER THREATENED TO HURT YOUR FAMILY OR YOUR PETS?: NO

## 2025-01-08 SDOH — SOCIAL STABILITY: SOCIAL INSECURITY: DO YOU FEEL UNSAFE GOING BACK TO THE PLACE WHERE YOU ARE LIVING?: NO

## 2025-01-08 SDOH — SOCIAL STABILITY: SOCIAL INSECURITY
WITHIN THE LAST YEAR, HAVE YOU BEEN KICKED, HIT, SLAPPED, OR OTHERWISE PHYSICALLY HURT BY YOUR PARTNER OR EX-PARTNER?: NO

## 2025-01-08 SDOH — ECONOMIC STABILITY: FOOD INSECURITY: WITHIN THE PAST 12 MONTHS, YOU WORRIED THAT YOUR FOOD WOULD RUN OUT BEFORE YOU GOT THE MONEY TO BUY MORE.: NEVER TRUE

## 2025-01-08 SDOH — HEALTH STABILITY: MENTAL HEALTH: HOW OFTEN DO YOU HAVE SIX OR MORE DRINKS ON ONE OCCASION?: NEVER

## 2025-01-08 SDOH — SOCIAL STABILITY: SOCIAL INSECURITY: WITHIN THE LAST YEAR, HAVE YOU BEEN HUMILIATED OR EMOTIONALLY ABUSED IN OTHER WAYS BY YOUR PARTNER OR EX-PARTNER?: NO

## 2025-01-08 SDOH — SOCIAL STABILITY: SOCIAL INSECURITY: ARE YOU OR HAVE YOU BEEN THREATENED OR ABUSED PHYSICALLY, EMOTIONALLY, OR SEXUALLY BY ANYONE?: NO

## 2025-01-08 SDOH — SOCIAL STABILITY: SOCIAL INSECURITY: HAVE YOU HAD THOUGHTS OF HARMING ANYONE ELSE?: NO

## 2025-01-08 SDOH — SOCIAL STABILITY: SOCIAL INSECURITY: ABUSE: ADULT

## 2025-01-08 SDOH — SOCIAL STABILITY: SOCIAL INSECURITY: WERE YOU ABLE TO COMPLETE ALL THE BEHAVIORAL HEALTH SCREENINGS?: YES

## 2025-01-08 SDOH — SOCIAL STABILITY: SOCIAL INSECURITY: WITHIN THE LAST YEAR, HAVE YOU BEEN AFRAID OF YOUR PARTNER OR EX-PARTNER?: NO

## 2025-01-08 SDOH — HEALTH STABILITY: MENTAL HEALTH: HOW OFTEN DO YOU HAVE A DRINK CONTAINING ALCOHOL?: NEVER

## 2025-01-08 SDOH — ECONOMIC STABILITY: FOOD INSECURITY: WITHIN THE PAST 12 MONTHS, THE FOOD YOU BOUGHT JUST DIDN'T LAST AND YOU DIDN'T HAVE MONEY TO GET MORE.: NEVER TRUE

## 2025-01-08 SDOH — HEALTH STABILITY: MENTAL HEALTH: HOW MANY DRINKS CONTAINING ALCOHOL DO YOU HAVE ON A TYPICAL DAY WHEN YOU ARE DRINKING?: PATIENT DOES NOT DRINK

## 2025-01-08 SDOH — SOCIAL STABILITY: SOCIAL INSECURITY: ARE THERE ANY APPARENT SIGNS OF INJURIES/BEHAVIORS THAT COULD BE RELATED TO ABUSE/NEGLECT?: NO

## 2025-01-08 SDOH — SOCIAL STABILITY: SOCIAL INSECURITY: HAVE YOU HAD ANY THOUGHTS OF HARMING ANYONE ELSE?: NO

## 2025-01-08 SDOH — SOCIAL STABILITY: SOCIAL INSECURITY: DO YOU FEEL ANYONE HAS EXPLOITED OR TAKEN ADVANTAGE OF YOU FINANCIALLY OR OF YOUR PERSONAL PROPERTY?: NO

## 2025-01-08 SDOH — ECONOMIC STABILITY: INCOME INSECURITY: IN THE PAST 12 MONTHS HAS THE ELECTRIC, GAS, OIL, OR WATER COMPANY THREATENED TO SHUT OFF SERVICES IN YOUR HOME?: NO

## 2025-01-08 SDOH — SOCIAL STABILITY: SOCIAL INSECURITY: DOES ANYONE TRY TO KEEP YOU FROM HAVING/CONTACTING OTHER FRIENDS OR DOING THINGS OUTSIDE YOUR HOME?: NO

## 2025-01-08 ASSESSMENT — LIFESTYLE VARIABLES
AUDIT-C TOTAL SCORE: 0
EVER HAD A DRINK FIRST THING IN THE MORNING TO STEADY YOUR NERVES TO GET RID OF A HANGOVER: NO
SKIP TO QUESTIONS 9-10: 1
HAVE PEOPLE ANNOYED YOU BY CRITICIZING YOUR DRINKING: NO
EVER FELT BAD OR GUILTY ABOUT YOUR DRINKING: NO
TOTAL SCORE: 0
HAVE YOU EVER FELT YOU SHOULD CUT DOWN ON YOUR DRINKING: NO

## 2025-01-08 ASSESSMENT — PAIN DESCRIPTION - LOCATION
LOCATION: CHEST

## 2025-01-08 ASSESSMENT — PAIN DESCRIPTION - PAIN TYPE: TYPE: ACUTE PAIN

## 2025-01-08 ASSESSMENT — PATIENT HEALTH QUESTIONNAIRE - PHQ9
1. LITTLE INTEREST OR PLEASURE IN DOING THINGS: NOT AT ALL
SUM OF ALL RESPONSES TO PHQ9 QUESTIONS 1 & 2: 0
2. FEELING DOWN, DEPRESSED OR HOPELESS: NOT AT ALL

## 2025-01-08 ASSESSMENT — COGNITIVE AND FUNCTIONAL STATUS - GENERAL
CLIMB 3 TO 5 STEPS WITH RAILING: A LITTLE
DAILY ACTIVITIY SCORE: 24
MOBILITY SCORE: 23
PATIENT BASELINE BEDBOUND: NO

## 2025-01-08 ASSESSMENT — PAIN SCALES - GENERAL
PAINLEVEL_OUTOF10: 0 - NO PAIN
PAINLEVEL_OUTOF10: 5 - MODERATE PAIN
PAINLEVEL_OUTOF10: 4
PAINLEVEL_OUTOF10: 4
PAINLEVEL_OUTOF10: 2
PAINLEVEL_OUTOF10: 0 - NO PAIN
PAINLEVEL_OUTOF10: 2
PAINLEVEL_OUTOF10: 2

## 2025-01-08 ASSESSMENT — ACTIVITIES OF DAILY LIVING (ADL)
LACK_OF_TRANSPORTATION: NO
ASSISTIVE_DEVICE: EYEGLASSES
HEARING - LEFT EAR: FUNCTIONAL
BATHING: INDEPENDENT
WALKS IN HOME: INDEPENDENT
DRESSING YOURSELF: INDEPENDENT
GROOMING: INDEPENDENT
ADEQUATE_TO_COMPLETE_ADL: YES
FEEDING YOURSELF: INDEPENDENT
JUDGMENT_ADEQUATE_SAFELY_COMPLETE_DAILY_ACTIVITIES: YES
TOILETING: INDEPENDENT
PATIENT'S MEMORY ADEQUATE TO SAFELY COMPLETE DAILY ACTIVITIES?: YES
HEARING - RIGHT EAR: FUNCTIONAL

## 2025-01-08 ASSESSMENT — COLUMBIA-SUICIDE SEVERITY RATING SCALE - C-SSRS
1. IN THE PAST MONTH, HAVE YOU WISHED YOU WERE DEAD OR WISHED YOU COULD GO TO SLEEP AND NOT WAKE UP?: NO
2. HAVE YOU ACTUALLY HAD ANY THOUGHTS OF KILLING YOURSELF?: NO
6. HAVE YOU EVER DONE ANYTHING, STARTED TO DO ANYTHING, OR PREPARED TO DO ANYTHING TO END YOUR LIFE?: NO

## 2025-01-08 ASSESSMENT — PAIN DESCRIPTION - ORIENTATION: ORIENTATION: MID

## 2025-01-08 ASSESSMENT — PAIN - FUNCTIONAL ASSESSMENT: PAIN_FUNCTIONAL_ASSESSMENT: 0-10

## 2025-01-08 NOTE — Clinical Note
Angioplasty of the circumflex lesion. Inflation 1: Pressure = 16 dayo; Duration = 8 sec. Inflation 2: Pressure = 20 dayo; Duration = 6 sec.

## 2025-01-08 NOTE — ED PROVIDER NOTES
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  Patient states she began having chest pain yesterday, took a nitro and it resolved, chest pain Kmak today around 4:15 PM and took another nitro, however did not resolve.  Describes it as a pressure type feeling.  States it is similar to when she had stents placed in the past.  No shortness of breath, fevers, chills, cough, runny nose, sore throat, nausea, vomiting, diarrhea, abdominal pain.  Denies tobacco or alcohol.  Most recent stress test is from 2022, no recent echocardiograms.  No history of DVT or PE.              Patient History   Past Medical History:   Diagnosis Date    Allergic     Arthritis     Asthma     CHF (congestive heart failure)     Coronary artery disease     Diabetes mellitus (Multi)     Disease of thyroid gland     Headache     Heart disease     Hypertension     Personal history of other diseases of the circulatory system     History of hypertension    Personal history of other diseases of the musculoskeletal system and connective tissue     History of rheumatoid arthritis    Personal history of other diseases of the respiratory system     History of asthma    Sleep apnea     Stroke (Multi)     Vitamin D deficiency, unspecified     Vitamin D deficiency     Past Surgical History:   Procedure Laterality Date    CARDIAC CATHETERIZATION      CARPAL TUNNEL RELEASE  09/24/2015    Neuroplasty Decompression Median Nerve At Carpal Tunnel    CORONARY STENT PLACEMENT      MR HEAD ANGIO WO IV CONTRAST  03/05/2020    MR HEAD ANGIO WO IV CONTRAST 3/5/2020 [facility] CLINICAL LEGACY    MR HEAD ANGIO WO IV CONTRAST  10/10/2023    MR HEAD ANGIO WO IV CONTRAST 10/10/2023 [facility]     MR NECK ANGIO WO IV CONTRAST  2020    MR NECK ANGIO WO IV CONTRAST 3/5/2020 [facility] CLINICAL LEGACY    OTHER SURGICAL HISTORY  10/23/2021    Dilation and curettage    OTHER SURGICAL HISTORY  10/23/2021    Colonoscopy    OTHER SURGICAL HISTORY  10/23/2021    Esophagogastroduodenoscopy    OTHER SURGICAL HISTORY  10/23/2021    Cardiac catheterization with stent placement    TUBAL LIGATION  2015    Tubal Ligation     Family History   Problem Relation Name Age of Onset    Cancer Mother Stephanie     Other (acute myocardial infarction) Father Kal     Heart disease Father Kal     Cancer Sister Teresa     Cancer Brother Kal     Diabetes Brother Kal     Cancer Maternal Grandmother      Cancer Maternal Grandfather Juan Luis     Cancer Paternal Grandfather Que     Diabetes Paternal Grandfather Que     Diabetes Other paternal aunt     Cancer Paternal Grandmother Amanda     Diabetes Father's Sister Fiorella     Diabetes Brother Jay     Diabetes Brother Mumtaz     Mental illness Brother Mumtaz     Diabetes Father's Sister Fiorella     Diabetes Brother Jay     Diabetes Brother Mumtaz     Mental illness Brother Mumtaz      Social History     Tobacco Use    Smoking status: Former     Current packs/day: 0.00     Types: Cigarettes     Quit date: 10/1/1989     Years since quittin.2    Smokeless tobacco: Never    Tobacco comments:     No longer an interest   Vaping Use    Vaping status: Never Used   Substance Use Topics    Alcohol use: Not Currently     Comment: Quit    Drug use: Never       Physical Exam   ED Triage Vitals [25 1702]   Temperature Heart Rate Respirations BP   36.8 °C (98.2 °F) 88 17 121/62      Pulse Ox Temp src Heart Rate Source Patient Position   97 % -- -- --      BP Location FiO2 (%)     -- --       Physical Exam  Constitutional:       General: She is not in acute distress.  HENT:      Head: Normocephalic.   Eyes:      Extraocular Movements: Extraocular movements intact.       Conjunctiva/sclera: Conjunctivae normal.      Pupils: Pupils are equal, round, and reactive to light.   Cardiovascular:      Rate and Rhythm: Normal rate and regular rhythm.      Pulses: Normal pulses.           Radial pulses are 2+ on the right side and 2+ on the left side.        Dorsalis pedis pulses are 2+ on the right side and 2+ on the left side.      Heart sounds: Normal heart sounds.   Pulmonary:      Effort: Pulmonary effort is normal.      Breath sounds: Normal breath sounds.   Abdominal:      General: There is no distension.      Palpations: Abdomen is soft. There is no mass.      Tenderness: There is no abdominal tenderness. There is no guarding.   Musculoskeletal:         General: No deformity.      Cervical back: Normal range of motion and neck supple.      Right lower leg: No edema.      Left lower leg: No edema.   Skin:     General: Skin is warm and dry.      Findings: No lesion or rash.   Neurological:      General: No focal deficit present.      Mental Status: She is alert and oriented to person, place, and time. Mental status is at baseline.      Cranial Nerves: No cranial nerve deficit.      Sensory: No sensory deficit.      Motor: No weakness.   Psychiatric:         Mood and Affect: Mood normal.           ED Course & MDM   Diagnoses as of 01/08/25 1928   Chest pain, unspecified type                 No data recorded                                 Medical Decision Making  EKG my interpretation: Rate 89, rhythm regular, axis leftward, , , QTc 503, T waves: Inversion in lead,  aVL, ST segments: No elevations or depressions, interpretation: Normal sinus rhythm, LBBB, no STEMI,  similar to EKG from October 9, 2023.    EKG on my interpretation: Rate 81, rhythm regular, axis leftward, , , QTc 515, T waves: Inversion in aVL, ST segments: No elevations or depressions, interpretation: Normal sinus rhythm, left bundle branch block, no STEMI    Patient is a 65-year-old female with  the above-stated past medical history who presents for chest pain.  Patient's EKGs are nonischemic, troponins are negative x 2, though patient's heart score is a 6 making her moderate risk for major verse cardiac event.  CMP is grossly unremarkable.  Lipase is not consistent with pancreatitis.  D-dimer was positive, CT angio was ordered which was negative for pulmonary embolism or acute findings.  Influenza, COVID, RSV are negative.  CBC shows no leukocytosis and patient is afebrile, low suspicion for infection as a cause for her symptoms.  No anemia.  Low clinical suspicion for dissection, given patient's CT scan result I have low suspicion for Boerhaave's, cardiac tamponade.  I discussed patient's case with Dr. Dahl who accepted patient for admission for ACS rule out.    Disclaimer: This note was dictated using speech recognition software. Minor errors in transcription may be present. Please call if questions.     Marvin Rider MD  [facility] Emergency Medicine  Contact on [employer]        Problems Addressed:  Chest pain, unspecified type: acute illness or injury    Amount and/or Complexity of Data Reviewed  Labs: ordered.  Radiology: ordered.  ECG/medicine tests: ordered and independent interpretation performed.        Procedure  Procedures     Marvin Rider MD  01/08/25 1658

## 2025-01-09 ENCOUNTER — APPOINTMENT (OUTPATIENT)
Dept: CARDIOLOGY | Facility: HOSPITAL | Age: 65
End: 2025-01-09
Payer: COMMERCIAL

## 2025-01-09 PROBLEM — I20.0 ANGINA PECTORIS, UNSTABLE (MULTI): Status: ACTIVE | Noted: 2025-01-08

## 2025-01-09 LAB
ACT BLD: 380 SEC (ref 83–199)
ALBUMIN SERPL BCP-MCNC: 4.1 G/DL (ref 3.4–5)
ALP SERPL-CCNC: 53 U/L (ref 33–136)
ALT SERPL W P-5'-P-CCNC: 20 U/L (ref 7–45)
ANION GAP SERPL CALC-SCNC: 13 MMOL/L (ref 10–20)
AORTIC VALVE MEAN GRADIENT: 4 MMHG
AORTIC VALVE PEAK VELOCITY: 1.31 M/S
AST SERPL W P-5'-P-CCNC: 21 U/L (ref 9–39)
AV PEAK GRADIENT: 7 MMHG
AVA (PEAK VEL): 2.49 CM2
AVA (VTI): 2.38 CM2
BASOPHILS # BLD AUTO: 0.03 X10*3/UL (ref 0–0.1)
BASOPHILS NFR BLD AUTO: 0.7 %
BILIRUB SERPL-MCNC: 1 MG/DL (ref 0–1.2)
BUN SERPL-MCNC: 23 MG/DL (ref 6–23)
CALCIUM SERPL-MCNC: 9.5 MG/DL (ref 8.6–10.3)
CARDIAC TROPONIN I PNL SERPL HS: 3 NG/L (ref 0–13)
CHLORIDE SERPL-SCNC: 101 MMOL/L (ref 98–107)
CHOLEST SERPL-MCNC: 121 MG/DL (ref 0–199)
CHOLESTEROL/HDL RATIO: 3.3
CO2 SERPL-SCNC: 27 MMOL/L (ref 21–32)
CREAT SERPL-MCNC: 1.03 MG/DL (ref 0.5–1.05)
EGFRCR SERPLBLD CKD-EPI 2021: 60 ML/MIN/1.73M*2
EJECTION FRACTION APICAL 4 CHAMBER: 44
EJECTION FRACTION: 48 %
EOSINOPHIL # BLD AUTO: 0.1 X10*3/UL (ref 0–0.7)
EOSINOPHIL NFR BLD AUTO: 2.2 %
ERYTHROCYTE [DISTWIDTH] IN BLOOD BY AUTOMATED COUNT: 12.7 % (ref 11.5–14.5)
EST. AVERAGE GLUCOSE BLD GHB EST-MCNC: 160 MG/DL
GLUCOSE BLD MANUAL STRIP-MCNC: 138 MG/DL (ref 74–99)
GLUCOSE BLD MANUAL STRIP-MCNC: 178 MG/DL (ref 74–99)
GLUCOSE BLD MANUAL STRIP-MCNC: 197 MG/DL (ref 74–99)
GLUCOSE BLD MANUAL STRIP-MCNC: 264 MG/DL (ref 74–99)
GLUCOSE SERPL-MCNC: 165 MG/DL (ref 74–99)
HBA1C MFR BLD: 7.2 %
HCT VFR BLD AUTO: 41.2 % (ref 36–46)
HDLC SERPL-MCNC: 36.9 MG/DL
HGB BLD-MCNC: 13.8 G/DL (ref 12–16)
IMM GRANULOCYTES # BLD AUTO: 0.02 X10*3/UL (ref 0–0.7)
IMM GRANULOCYTES NFR BLD AUTO: 0.4 % (ref 0–0.9)
LDLC SERPL CALC-MCNC: 50 MG/DL
LEFT ATRIUM VOLUME AREA LENGTH INDEX BSA: 16.9 ML/M2
LEFT VENTRICLE INTERNAL DIMENSION DIASTOLE: 4.98 CM (ref 3.5–6)
LEFT VENTRICULAR OUTFLOW TRACT DIAMETER: 2 CM
LV EJECTION FRACTION BIPLANE: 45 %
LYMPHOCYTES # BLD AUTO: 1.26 X10*3/UL (ref 1.2–4.8)
LYMPHOCYTES NFR BLD AUTO: 27.5 %
MAGNESIUM SERPL-MCNC: 2.13 MG/DL (ref 1.6–2.4)
MCH RBC QN AUTO: 29.9 PG (ref 26–34)
MCHC RBC AUTO-ENTMCNC: 33.5 G/DL (ref 32–36)
MCV RBC AUTO: 89 FL (ref 80–100)
MITRAL VALVE E/A RATIO: 0.7
MONOCYTES # BLD AUTO: 0.58 X10*3/UL (ref 0.1–1)
MONOCYTES NFR BLD AUTO: 12.6 %
NEUTROPHILS # BLD AUTO: 2.6 X10*3/UL (ref 1.2–7.7)
NEUTROPHILS NFR BLD AUTO: 56.6 %
NON HDL CHOLESTEROL: 84 MG/DL (ref 0–149)
NRBC BLD-RTO: 0 /100 WBCS (ref 0–0)
PLATELET # BLD AUTO: 179 X10*3/UL (ref 150–450)
POTASSIUM SERPL-SCNC: 3.6 MMOL/L (ref 3.5–5.3)
PROT SERPL-MCNC: 7.2 G/DL (ref 6.4–8.2)
RBC # BLD AUTO: 4.62 X10*6/UL (ref 4–5.2)
RIGHT VENTRICLE FREE WALL PEAK S': 10.5 CM/S
RIGHT VENTRICLE PEAK SYSTOLIC PRESSURE: 9.8 MMHG
SODIUM SERPL-SCNC: 137 MMOL/L (ref 136–145)
T3FREE SERPL-MCNC: 3 PG/ML (ref 2.3–4.2)
TRICUSPID ANNULAR PLANE SYSTOLIC EXCURSION: 1.8 CM
TRIGL SERPL-MCNC: 173 MG/DL (ref 0–149)
TSH SERPL-ACNC: 5.31 MIU/L (ref 0.44–3.98)
VLDL: 35 MG/DL (ref 0–40)
WBC # BLD AUTO: 4.6 X10*3/UL (ref 4.4–11.3)

## 2025-01-09 PROCEDURE — C1887 CATHETER, GUIDING: HCPCS | Performed by: INTERNAL MEDICINE

## 2025-01-09 PROCEDURE — 7100000009 HC PHASE TWO TIME - INITIAL BASE CHARGE: Performed by: INTERNAL MEDICINE

## 2025-01-09 PROCEDURE — 92928 PRQ TCAT PLMT NTRAC ST 1 LES: CPT | Performed by: INTERNAL MEDICINE

## 2025-01-09 PROCEDURE — 83735 ASSAY OF MAGNESIUM: CPT | Performed by: INTERNAL MEDICINE

## 2025-01-09 PROCEDURE — 2500000004 HC RX 250 GENERAL PHARMACY W/ HCPCS (ALT 636 FOR OP/ED): Performed by: INTERNAL MEDICINE

## 2025-01-09 PROCEDURE — 2500000001 HC RX 250 WO HCPCS SELF ADMINISTERED DRUGS (ALT 637 FOR MEDICARE OP): Performed by: NURSE PRACTITIONER

## 2025-01-09 PROCEDURE — 2500000004 HC RX 250 GENERAL PHARMACY W/ HCPCS (ALT 636 FOR OP/ED): Performed by: NURSE PRACTITIONER

## 2025-01-09 PROCEDURE — 93306 TTE W/DOPPLER COMPLETE: CPT | Performed by: INTERNAL MEDICINE

## 2025-01-09 PROCEDURE — 2500000001 HC RX 250 WO HCPCS SELF ADMINISTERED DRUGS (ALT 637 FOR MEDICARE OP): Performed by: INTERNAL MEDICINE

## 2025-01-09 PROCEDURE — 84481 FREE ASSAY (FT-3): CPT | Mod: ELYLAB | Performed by: INTERNAL MEDICINE

## 2025-01-09 PROCEDURE — 2500000002 HC RX 250 W HCPCS SELF ADMINISTERED DRUGS (ALT 637 FOR MEDICARE OP, ALT 636 FOR OP/ED): Performed by: NURSE PRACTITIONER

## 2025-01-09 PROCEDURE — 93458 L HRT ARTERY/VENTRICLE ANGIO: CPT | Mod: 59 | Performed by: INTERNAL MEDICINE

## 2025-01-09 PROCEDURE — 7100000010 HC PHASE TWO TIME - EACH INCREMENTAL 1 MINUTE: Performed by: INTERNAL MEDICINE

## 2025-01-09 PROCEDURE — 97165 OT EVAL LOW COMPLEX 30 MIN: CPT | Mod: GO

## 2025-01-09 PROCEDURE — 80053 COMPREHEN METABOLIC PANEL: CPT | Performed by: INTERNAL MEDICINE

## 2025-01-09 PROCEDURE — 96372 THER/PROPH/DIAG INJ SC/IM: CPT | Performed by: INTERNAL MEDICINE

## 2025-01-09 PROCEDURE — 97161 PT EVAL LOW COMPLEX 20 MIN: CPT | Mod: GP

## 2025-01-09 PROCEDURE — 96373 THER/PROPH/DIAG INJ IA: CPT | Performed by: INTERNAL MEDICINE

## 2025-01-09 PROCEDURE — 7100000001 HC RECOVERY ROOM TIME - INITIAL BASE CHARGE: Performed by: INTERNAL MEDICINE

## 2025-01-09 PROCEDURE — 2550000001 HC RX 255 CONTRASTS: Performed by: INTERNAL MEDICINE

## 2025-01-09 PROCEDURE — 99153 MOD SED SAME PHYS/QHP EA: CPT

## 2025-01-09 PROCEDURE — 80061 LIPID PANEL: CPT | Performed by: INTERNAL MEDICINE

## 2025-01-09 PROCEDURE — 96376 TX/PRO/DX INJ SAME DRUG ADON: CPT | Mod: 59

## 2025-01-09 PROCEDURE — C1769 GUIDE WIRE: HCPCS | Performed by: INTERNAL MEDICINE

## 2025-01-09 PROCEDURE — 99152 MOD SED SAME PHYS/QHP 5/>YRS: CPT | Performed by: INTERNAL MEDICINE

## 2025-01-09 PROCEDURE — 93454 CORONARY ARTERY ANGIO S&I: CPT | Performed by: INTERNAL MEDICINE

## 2025-01-09 PROCEDURE — 7100000002 HC RECOVERY ROOM TIME - EACH INCREMENTAL 1 MINUTE: Performed by: INTERNAL MEDICINE

## 2025-01-09 PROCEDURE — 82947 ASSAY GLUCOSE BLOOD QUANT: CPT

## 2025-01-09 PROCEDURE — 85347 COAGULATION TIME ACTIVATED: CPT | Performed by: INTERNAL MEDICINE

## 2025-01-09 PROCEDURE — G0378 HOSPITAL OBSERVATION PER HR: HCPCS

## 2025-01-09 PROCEDURE — C1894 INTRO/SHEATH, NON-LASER: HCPCS | Performed by: INTERNAL MEDICINE

## 2025-01-09 PROCEDURE — 99024 POSTOP FOLLOW-UP VISIT: CPT | Performed by: NURSE PRACTITIONER

## 2025-01-09 PROCEDURE — 85025 COMPLETE CBC W/AUTO DIFF WBC: CPT | Performed by: INTERNAL MEDICINE

## 2025-01-09 PROCEDURE — 36415 COLL VENOUS BLD VENIPUNCTURE: CPT | Performed by: INTERNAL MEDICINE

## 2025-01-09 PROCEDURE — 2500000002 HC RX 250 W HCPCS SELF ADMINISTERED DRUGS (ALT 637 FOR MEDICARE OP, ALT 636 FOR OP/ED): Performed by: INTERNAL MEDICINE

## 2025-01-09 PROCEDURE — 99223 1ST HOSP IP/OBS HIGH 75: CPT | Performed by: NURSE PRACTITIONER

## 2025-01-09 PROCEDURE — C1874 STENT, COATED/COV W/DEL SYS: HCPCS | Performed by: INTERNAL MEDICINE

## 2025-01-09 PROCEDURE — 84443 ASSAY THYROID STIM HORMONE: CPT | Performed by: INTERNAL MEDICINE

## 2025-01-09 PROCEDURE — 2720000007 HC OR 272 NO HCPCS: Performed by: INTERNAL MEDICINE

## 2025-01-09 PROCEDURE — C9600 PERC DRUG-EL COR STENT SING: HCPCS | Mod: LC | Performed by: INTERNAL MEDICINE

## 2025-01-09 PROCEDURE — 93005 ELECTROCARDIOGRAM TRACING: CPT

## 2025-01-09 PROCEDURE — 85347 COAGULATION TIME ACTIVATED: CPT

## 2025-01-09 PROCEDURE — 2780000003 HC OR 278 NO HCPCS: Performed by: INTERNAL MEDICINE

## 2025-01-09 PROCEDURE — C1725 CATH, TRANSLUMIN NON-LASER: HCPCS | Performed by: INTERNAL MEDICINE

## 2025-01-09 PROCEDURE — C8929 TTE W OR WO FOL WCON,DOPPLER: HCPCS

## 2025-01-09 DEVICE — STENT ONYXNG27518UX ONYX 2.75X18RX
Type: IMPLANTABLE DEVICE | Site: CORONARY | Status: FUNCTIONAL
Brand: ONYX FRONTIER™

## 2025-01-09 RX ORDER — ALUMINUM HYDROXIDE, MAGNESIUM HYDROXIDE, AND SIMETHICONE 1200; 120; 1200 MG/30ML; MG/30ML; MG/30ML
30 SUSPENSION ORAL 4 TIMES DAILY PRN
Status: DISCONTINUED | OUTPATIENT
Start: 2025-01-09 | End: 2025-01-10 | Stop reason: HOSPADM

## 2025-01-09 RX ORDER — MIDAZOLAM HYDROCHLORIDE 1 MG/ML
INJECTION, SOLUTION INTRAMUSCULAR; INTRAVENOUS AS NEEDED
Status: DISCONTINUED | OUTPATIENT
Start: 2025-01-09 | End: 2025-01-09 | Stop reason: HOSPADM

## 2025-01-09 RX ORDER — SODIUM CHLORIDE 9 MG/ML
100 INJECTION, SOLUTION INTRAVENOUS CONTINUOUS
Status: CANCELLED | OUTPATIENT
Start: 2025-01-09 | End: 2025-01-10

## 2025-01-09 RX ORDER — HEPARIN SODIUM 1000 [USP'U]/ML
INJECTION, SOLUTION INTRAVENOUS; SUBCUTANEOUS AS NEEDED
Status: DISCONTINUED | OUTPATIENT
Start: 2025-01-09 | End: 2025-01-09 | Stop reason: HOSPADM

## 2025-01-09 RX ORDER — FENTANYL CITRATE 50 UG/ML
INJECTION, SOLUTION INTRAMUSCULAR; INTRAVENOUS AS NEEDED
Status: DISCONTINUED | OUTPATIENT
Start: 2025-01-09 | End: 2025-01-09 | Stop reason: HOSPADM

## 2025-01-09 RX ORDER — METOPROLOL SUCCINATE 25 MG/1
25 TABLET, EXTENDED RELEASE ORAL DAILY
Status: DISCONTINUED | OUTPATIENT
Start: 2025-01-09 | End: 2025-01-10 | Stop reason: HOSPADM

## 2025-01-09 RX ORDER — NITROGLYCERIN 40 MG/100ML
INJECTION INTRAVENOUS AS NEEDED
Status: DISCONTINUED | OUTPATIENT
Start: 2025-01-09 | End: 2025-01-09 | Stop reason: HOSPADM

## 2025-01-09 RX ORDER — MORPHINE SULFATE 2 MG/ML
2 INJECTION, SOLUTION INTRAMUSCULAR; INTRAVENOUS ONCE
Status: COMPLETED | OUTPATIENT
Start: 2025-01-09 | End: 2025-01-09

## 2025-01-09 RX ORDER — ISOSORBIDE MONONITRATE 60 MG/1
60 TABLET, EXTENDED RELEASE ORAL DAILY
Qty: 30 TABLET | Refills: 11 | Status: ON HOLD | OUTPATIENT
Start: 2025-01-09 | End: 2026-01-09

## 2025-01-09 RX ORDER — ISOSORBIDE MONONITRATE 60 MG/1
60 TABLET, EXTENDED RELEASE ORAL DAILY
Status: DISCONTINUED | OUTPATIENT
Start: 2025-01-09 | End: 2025-01-10 | Stop reason: HOSPADM

## 2025-01-09 RX ADMIN — Medication 2000 UNITS: at 09:02

## 2025-01-09 RX ADMIN — SODIUM CHLORIDE 250 ML: 9 INJECTION, SOLUTION INTRAVENOUS at 11:32

## 2025-01-09 RX ADMIN — AMLODIPINE BESYLATE 5 MG: 5 TABLET ORAL at 09:02

## 2025-01-09 RX ADMIN — SPIRONOLACTONE 25 MG: 25 TABLET ORAL at 09:04

## 2025-01-09 RX ADMIN — GLIPIZIDE 5 MG: 2.5 TABLET, EXTENDED RELEASE ORAL at 09:04

## 2025-01-09 RX ADMIN — FAMOTIDINE 20 MG: 20 TABLET, FILM COATED ORAL at 20:10

## 2025-01-09 RX ADMIN — LEVOTHYROXINE SODIUM 25 MCG: 0.03 TABLET ORAL at 06:19

## 2025-01-09 RX ADMIN — PERFLUTREN 1 ML OF DILUTION: 6.52 INJECTION, SUSPENSION INTRAVENOUS at 08:31

## 2025-01-09 RX ADMIN — ACETAMINOPHEN 650 MG: 325 TABLET ORAL at 09:08

## 2025-01-09 RX ADMIN — ASPIRIN 81 MG: 81 TABLET, COATED ORAL at 09:01

## 2025-01-09 RX ADMIN — INSULIN LISPRO 12 UNITS: 100 INJECTION, SOLUTION INTRAVENOUS; SUBCUTANEOUS at 17:36

## 2025-01-09 RX ADMIN — HYDROCHLOROTHIAZIDE 12.5 MG: 12.5 TABLET ORAL at 09:04

## 2025-01-09 RX ADMIN — HEPARIN SODIUM 5000 UNITS: 5000 INJECTION INTRAVENOUS; SUBCUTANEOUS at 06:19

## 2025-01-09 RX ADMIN — FAMOTIDINE 20 MG: 20 TABLET, FILM COATED ORAL at 09:03

## 2025-01-09 RX ADMIN — CLOPIDOGREL BISULFATE 75 MG: 75 TABLET, FILM COATED ORAL at 09:03

## 2025-01-09 RX ADMIN — Medication 1000 MCG: at 09:03

## 2025-01-09 RX ADMIN — ATORVASTATIN CALCIUM 40 MG: 20 TABLET, FILM COATED ORAL at 20:10

## 2025-01-09 RX ADMIN — ACETAMINOPHEN 650 MG: 325 TABLET ORAL at 04:55

## 2025-01-09 RX ADMIN — MORPHINE SULFATE 2 MG: 2 INJECTION, SOLUTION INTRAMUSCULAR; INTRAVENOUS at 11:31

## 2025-01-09 RX ADMIN — SERTRALINE HYDROCHLORIDE 50 MG: 50 TABLET, FILM COATED ORAL at 09:02

## 2025-01-09 ASSESSMENT — PAIN SCALES - GENERAL
PAINLEVEL_OUTOF10: 0 - NO PAIN
PAINLEVEL_OUTOF10: 3
PAINLEVEL_OUTOF10: 0 - NO PAIN
PAINLEVEL_OUTOF10: 0 - NO PAIN
PAINLEVEL_OUTOF10: 4
PAINLEVEL_OUTOF10: 0 - NO PAIN
PAINLEVEL_OUTOF10: 3
PAINLEVEL_OUTOF10: 0 - NO PAIN
PAINLEVEL_OUTOF10: 2
PAINLEVEL_OUTOF10: 4
PAINLEVEL_OUTOF10: 0 - NO PAIN

## 2025-01-09 ASSESSMENT — COGNITIVE AND FUNCTIONAL STATUS - GENERAL
MOBILITY SCORE: 24
DAILY ACTIVITIY SCORE: 24
CLIMB 3 TO 5 STEPS WITH RAILING: A LITTLE
MOBILITY SCORE: 23
CLIMB 3 TO 5 STEPS WITH RAILING: A LITTLE
MOBILITY SCORE: 23
DAILY ACTIVITIY SCORE: 24
DAILY ACTIVITIY SCORE: 24

## 2025-01-09 ASSESSMENT — PAIN - FUNCTIONAL ASSESSMENT

## 2025-01-09 ASSESSMENT — PAIN DESCRIPTION - LOCATION
LOCATION: CHEST
LOCATION: HEAD

## 2025-01-09 ASSESSMENT — ACTIVITIES OF DAILY LIVING (ADL): BATHING_ASSISTANCE: INDEPENDENT

## 2025-01-09 NOTE — H&P (VIEW-ONLY)
Cardiology Consult Note      Date:   1/9/2025  Patient name:  Lalita Dumont  Date of admission:  1/8/2025  5:04 PM  MRN:   [id number]  YOB: 1960  Time of Consult:  11:22 AM    Consulting Cardiologist:    CARMEL Gr CNP  Primary Cardiologist:  Dr. Tae Blanchard    Referring Provider: Dr Dahl      Admission Diagnosis:     Chest pain, unspecified      History of Present Illness:      Lalita Dumont is a 65 y.o.  female patient who is being at the request of Dr. Dahl for inpatient consultation of angina. She was admitted on 1/8/2025.  Previous [facility] and [facility] records have been reviewed in detail.    Patient with a history of diabetes, CAD, hypertension, dyslipidemia  Patient states that she was sitting at home on Monday on the couch was not doing anything and she got sudden onset of shortness of breath and chest pain she described it as actually someone squeezing her chest she states that she took 2 nitro total and the pain went from a 5 down to a 0.  Then on Tuesday she was sitting down not doing anything chest pain came back she took another nitro so she decided to come into the hospital.  Last night when she was sleeping in the hospital she got woken up by having the chest pain in the center of her chest.  She states that in the last week or 2 she has had increased fatigue just not quite feeling like herself.  Positive for chest pain, shortness of breath, fatigue  Denies nausea, vomiting, PND, orthopnea, claudications  Throughout length in regards to this chest pain she said this is very out of the ordinary for her but this chest pain is very different it is coming much more frequent and it is coming even at night.    EKG is normal sinus rhythm will at the left bundle branch block  Cholesterol 121  Troponin 4, 3  Magnesium 2.13    Cardiac history  CONCLUSIONS:   1. The left ventricular systolic function is mildly decreased, with a visually estimated ejection fraction of 45-50%.    2. There is global hypokinesis of the left ventricle with minor regional variations.   3. Left ventricular diastolic filling was indeterminate.   4. There is normal right ventricular global systolic function.   5. Trace MR.   6. Right ventricular systolic pressure is within normal limits.   7. Trace AI, No aortic stenosis.   8. Normal valve function with trivial clinically insignificant AI/MR.   9. Prior echo studies unavailable for comparison at this time.     7/30/2020  CONCLUSIONS:   1. Left Main Coronary Artery: This artery is normal.   2. Left Anterior Descending Artery: contains patent previously placed stents and presents luminal irregularities.   3. Circumflex Coronary Artery: presents luminal irregularities and contains patent previously placed stents.   4. Distal CX Lesion: The percent stenosis is 50%.   5. Right Coronary Artery: presents luminal irregularities.   6. Proximal RCA Lesion: The percent stenosis is 50%.   7. The Left Ventricular Ejection Fraction is 55%.         Allergies:     Allergies   Allergen Reactions    Bactrim [Sulfamethoxazole-Trimethoprim] GI Upset    Codeine Swelling    Darvocet A500 [Propoxyphene N-Acetaminophen] Hives    Erythromycin Itching and Swelling    Lisinopril Swelling    Omeprazole Hives    Penicillins Swelling    Azithromycin Rash         Past Medical History:     Past Medical History:   Diagnosis Date    Allergic     Arthritis     Asthma     CHF (congestive heart failure)     Coronary artery disease     Diabetes mellitus (Multi)     Disease of thyroid gland     Headache     Heart disease     Hypertension     Personal history of other diseases of the circulatory system     History of hypertension    Personal history of other diseases of the musculoskeletal system and connective tissue     History of rheumatoid arthritis    Personal history of other diseases of the respiratory system     History of asthma    Sleep apnea     Stroke (Multi)     Vitamin D deficiency,  unspecified     Vitamin D deficiency       Past Surgical History:     Past Surgical History:   Procedure Laterality Date    CARDIAC CATHETERIZATION      CARPAL TUNNEL RELEASE  2015    Neuroplasty Decompression Median Nerve At Carpal Tunnel    CORONARY STENT PLACEMENT      MR HEAD ANGIO WO IV CONTRAST  2020    MR HEAD ANGIO WO IV CONTRAST 3/5/2020 [facility]INICAL LEGACY    MR HEAD ANGIO WO IV CONTRAST  10/10/2023    MR HEAD ANGIO WO IV CONTRAST 10/10/2023 [facility]    MR NECK ANGIO WO IV CONTRAST  2020    MR NECK ANGIO WO IV CONTRAST 3/5/2020 [facility] CLINICAL LEGACY    OTHER SURGICAL HISTORY  10/23/2021    Dilation and curettage    OTHER SURGICAL HISTORY  10/23/2021    Colonoscopy    OTHER SURGICAL HISTORY  10/23/2021    Esophagogastroduodenoscopy    OTHER SURGICAL HISTORY  10/23/2021    Cardiac catheterization with stent placement    TUBAL LIGATION  2015    Tubal Ligation       Family History:     Family History   Problem Relation Name Age of Onset    Cancer Mother Stephanie     Other (acute myocardial infarction) Father Kal     Heart disease Father Kal     Cancer Sister Teresa     Cancer Brother Kal     Diabetes Brother Kal     Cancer Maternal Grandmother      Cancer Maternal Grandfather Juan Luis     Cancer Paternal Grandfather Que     Diabetes Paternal Grandfather Que     Diabetes Other paternal aunt     Cancer Paternal Grandmother Amanda     Diabetes Father's Sister Fiorella     Diabetes Brother Jay     Diabetes Brother Mumtaz     Mental illness Brother Mumtaz     Diabetes Father's Sister Fiorella     Diabetes Brother Jay     Diabetes Brother Mumtaz     Mental illness Brother Mumtaz        Social History:     Social History     Tobacco Use    Smoking status: Former     Current packs/day: 0.00     Types: Cigarettes     Quit date: 10/1/1989     Years since quittin.2    Smokeless tobacco: Never    Tobacco comments:     No longer an interest   Vaping Use    Vaping status:  Never Used   Substance Use Topics    Alcohol use: Not Currently     Comment: Quit    Drug use: Never       CURRENT INPATIENT MEDICATIONS    amLODIPine, 5 mg, oral, Daily  aspirin, 81 mg, oral, Daily  atorvastatin, 40 mg, oral, Nightly  cholecalciferol, 2,000 Units, oral, Daily  clopidogrel, 75 mg, oral, Daily  cyanocobalamin, 1,000 mcg, oral, Daily  famotidine, 20 mg, oral, BID   Or  famotidine, 20 mg, intravenous, BID  glipiZIDE XL, 5 mg, oral, Daily  heparin (porcine), 5,000 Units, subcutaneous, q8h  [Held by provider] hydroCHLOROthiazide, 12.5 mg, oral, Daily  insulin lispro, 0-20 Units, subcutaneous, Before meals & nightly  isosorbide mononitrate ER, 60 mg, oral, Daily  levothyroxine, 25 mcg, oral, Daily  metoprolol succinate XL, 25 mg, oral, Daily  morphine, 2 mg, intravenous, Once  sertraline, 50 mg, oral, Daily  sodium chloride, 250 mL, intravenous, Once  spironolactone, 25 mg, oral, Daily         Current Outpatient Medications   Medication Instructions    acetaminophen (TYLENOL) 650 mg, Every 4 hours PRN    albuterol 90 mcg/actuation inhaler 2 puffs, Every 6 hours PRN    amLODIPine (NORVASC) 5 mg, oral, Daily    aspirin 81 mg EC tablet 1 tablet, Daily    atorvastatin (LIPITOR) 40 mg, oral, Daily    cholecalciferol (VITAMIN D-3) 2,000 Units, 2 times daily    clopidogrel (PLAVIX) 75 mg, oral, Daily    clotrimazole-betamethasone (Lotrisone) cream Apply sparingly to corners of mouth twice a day for up to 2 weeks.  Avoid ingestion.    cyanocobalamin (Vitamin B-12) 1,000 mcg tablet 1 tablet, Daily    glipiZIDE XL (GLUCOTROL XL) 5 mg, oral, Daily, Do not crush, chew, or split.    hydroCHLOROthiazide (MICROZIDE) 12.5 mg, oral, Daily    ipratropium-albuteroL (Duo-Neb) 0.5-2.5 mg/3 mL nebulizer solution 3 mL, Every 4 hours PRN    levothyroxine (SYNTHROID, LEVOXYL) 25 mcg, oral, Daily    nitroglycerin (NITROSTAT) 0.4 mg, sublingual, Every 5 min PRN    PlaqueniL 400 mg, Daily    Refresh Optive Advanced 0.5-1-0.5 % drops  "2 drops, Daily    sertraline (ZOLOFT) 50 mg, oral, Daily    spironolactone (ALDACTONE) 25 mg, oral, Daily        Review of Systems:      12 point review of systems was obtained in detail and is negative other than that detailed above.    Vital Signs:     Vitals:    01/08/25 2222 01/08/25 2359 01/09/25 0347 01/09/25 0744   BP: 141/69 118/56 126/66 118/61   BP Location: Right arm Right arm Right arm Right arm   Patient Position: Sitting Lying Lying Lying   Pulse: 77 66 63 61   Resp: 18 17 16 17   Temp: 36 °C (96.8 °F) 36 °C (96.8 °F) 36.1 °C (97 °F) 35.6 °C (96.1 °F)   TempSrc: Temporal Temporal Temporal Temporal   SpO2: 94% 93% 93% 94%   Weight: 92.5 kg (203 lb 14.8 oz)      Height: 1.6 m (5' 3\")        No intake or output data in the 24 hours ending 01/09/25 1122    Wt Readings from Last 4 Encounters:   01/08/25 92.5 kg (203 lb 14.8 oz)   10/24/24 90.5 kg (199 lb 9.6 oz)   10/17/24 91.1 kg (200 lb 12.8 oz)   06/02/24 89.4 kg (197 lb)       Physical Examination:     GENERAL APPEARANCE: Well developed, well nourished, in no acute distress.  CHEST: Symmetric and non-tender.  INTEGUMENT: Skin warm and dry, without gross excoriationis or lesions.  HEENT: No gross abnormalities of conjunctiva, teeth, gums, oral mucosa  NECK: Supple, no JVD, no bruit. Thyroid not palpable. Carotid upstrokes normal.  NEURO/PSHCY: Alert and oriented x3; appropriate behavior and responses and responses, grossly normal cerebellar function with normal balance and coordination  LUNGS: Clear to auscultation bilaterally; normal respiratory effort.  HEART: Rate and rhythm regular with no evident murmur; no gallop appreciated. There are no rubs, clicks or heaves. PMI nondisplaced.  ABDOMEN: Soft, nontender, no palpable hepatosplenomegaly, no mases, no bruits. Abdominal aorta not noted to be enlarged.  MUSCULOSKELETAL: Ambulatory with normal tandem gait.  EXTREMITIES: Warm with good color, no clubbing or cyanois. There is no edema noted.  PERIPHERAL " VASCULAR: Pulses present and equally palpable; 2+ throughout. No femoral bruits.      Lab:     CBC:   Results from last 7 days   Lab Units 01/09/25  0609 01/08/25  1722   WBC AUTO x10*3/uL 4.6 5.0   RBC AUTO x10*6/uL 4.62 4.94   HEMOGLOBIN g/dL 13.8 14.5   HEMATOCRIT % 41.2 43.1   MCV fL 89 87   MCH pg 29.9 29.4   MCHC g/dL 33.5 33.6   RDW % 12.7 12.5   PLATELETS AUTO x10*3/uL 179 192     CMP:    Results from last 7 days   Lab Units 01/09/25  0609 01/08/25  1722   SODIUM mmol/L 137 136   POTASSIUM mmol/L 3.6 3.7   CHLORIDE mmol/L 101 101   CO2 mmol/L 27 24   BUN mg/dL 23 23   CREATININE mg/dL 1.03 0.93   GLUCOSE mg/dL 165* 163*   PROTEIN TOTAL g/dL 7.2 8.0   CALCIUM mg/dL 9.5 9.9   BILIRUBIN TOTAL mg/dL 1.0 1.0   ALK PHOS U/L 53 59   AST U/L 21 24   ALT U/L 20 22     BMP:    Results from last 7 days   Lab Units 01/09/25  0609 01/08/25  1722   SODIUM mmol/L 137 136   POTASSIUM mmol/L 3.6 3.7   CHLORIDE mmol/L 101 101   CO2 mmol/L 27 24   BUN mg/dL 23 23   CREATININE mg/dL 1.03 0.93   CALCIUM mg/dL 9.5 9.9   GLUCOSE mg/dL 165* 163*     Magnesium:  Results from last 7 days   Lab Units 01/09/25  0609   MAGNESIUM mg/dL 2.13     Troponin:    Results from last 7 days   Lab Units 01/08/25  2354 01/08/25  2300 01/08/25  1921   TROPHS ng/L 3 4 4     BNP:     Lipid Panel:  Results from last 7 days   Lab Units 01/09/25  0609   HDL mg/dL 36.9   CHOLESTEROL/HDL RATIO  3.3   VLDL mg/dL 35   TRIGLYCERIDES mg/dL 173*   NON HDL CHOL. mg/dL 84        Diagnostic Studies:   @No results found for this or any previous visit.      CT angio chest for pulmonary embolism    Result Date: 1/8/2025  Interpreted By:  Luis Calabrese, STUDY: CT ANGIO CHEST FOR PULMONARY EMBOLISM;  1/8/2025 7:02 pm   INDICATION: Signs/Symptoms:+ dimer.   COMPARISON: None   ACCESSION NUMBER(S): [id number]   ORDERING CLINICIAN: HIRO EVANGELISTA   TECHNIQUE: Helical data acquisition of the chest was obtained after intravenous administration of , as per PE protocol.  Images were reformatted in coronal and sagittal planes. Axial and coronal maximum intensity projection (MIP) images were created and reviewed.   FINDINGS: POTENTIAL LIMITATIONS OF THE STUDY: None   HEART AND VESSELS:   There are no discrete filling defects within main pulmonary artery and its branches to suggest acute pulmonary embolism.   Main pulmonary artery and its branches are normal in caliber.   The thoracic aorta normal in course and caliber.   Coronary artery stent in place suspected. No significant coronary artery calcifications are seen. Please note, the study is not optimized for evaluation of coronary arteries.   The cardiac chambers are not enlarged.   3 mm ground-glass nodules within the minor fissure on image 124 and 129/279 similar to prior. Findings are nonspecific in etiology and may represent fissural lymph nodes   MEDIASTINUM AND RADHA, LOWER NECK AND AXILLA:   The visualized thyroid gland is within normal limits.   No evidence of thoracic lymphadenopathy by CT criteria.   Esophagus appears within normal limits as seen.   LUNGS AND AIRWAYS: The trachea and central airways are patent. No endobronchial lesion is seen.   The bilateral lungs are clear without evidence of focal consolidation, pleural effusion, or pneumothorax.     UPPER ABDOMEN: The visualized subdiaphragmatic structures demonstrate no remarkable findings.   CHEST WALL AND OSSEOUS STRUCTURES:   Chest wall is within normal limits. No acute osseous pathology.There are no suspicious osseous lesions.       1. Coronary artery stent in place. No evidence of acute pulmonary embolism. 2. Nonspecific pleural base ground-glass nodules within the right minor fissure similar to prior, stable and nonspecific in nature. Findings may represent fissural lymph node. 3. Additional detailed findings as above.     MACRO: None   Signed by: Luis Calabrese 1/8/2025 7:41 PM Dictation workstation:   [id number]    ECG 12 lead    Result Date: 1/8/2025  Normal  sinus rhythm Left axis deviation Left bundle branch block Abnormal ECG When compared with ECG of 08-JAN-2025 17:02, (unconfirmed) No significant change was found    XR chest 1 view    Result Date: 1/8/2025  Interpreted By:  Sosa Lazaro, STUDY: XR CHEST 1 VIEW;  1/8/2025 5:18 pm   INDICATION: Signs/Symptoms:Chest Pain.   COMPARISON: Chest x-ray 02/02/2023   ACCESSION NUMBER(S): [id number]   ORDERING CLINICIAN: HIRO EVANGELISTA   FINDINGS:     CARDIOMEDIASTINAL SILHOUETTE: Cardiomediastinal silhouette is normal in size and configuration. Atherosclerotic calcification of the aorta.   LUNGS: No consolidation, pleural effusion or pneumothorax.   ABDOMEN: No remarkable upper abdominal findings.   BONES: Multilevel degenerative changes of the spine.       No acute cardiopulmonary process.   MACRO: None   Signed by: Sosa Lazaro 1/8/2025 5:55 PM Dictation workstation:   [id number]    ECG 12 lead    Result Date: 1/8/2025  Normal sinus rhythm Left axis deviation Left bundle branch block Abnormal ECG When compared with ECG of 09-OCT-2023 21:17, Previous ECG has undetermined rhythm, needs review      Radiology:     Transthoracic Echo (TTE) Complete   Final Result      CT angio chest for pulmonary embolism   Final Result   1. Coronary artery stent in place. No evidence of acute pulmonary   embolism.   2. Nonspecific pleural base ground-glass nodules within the right   minor fissure similar to prior, stable and nonspecific in nature.   Findings may represent fissural lymph node.   3. Additional detailed findings as above.             MACRO:   None        Signed by: Luis Calabrese 1/8/2025 7:41 PM   Dictation workstation:   [id number]      XR chest 1 view   Final Result   No acute cardiopulmonary process.        MACRO:   None        Signed by: Sosa Lazaro 1/8/2025 5:55 PM   Dictation workstation:   [id number]          Problem List:     Patient Active Problem List   Diagnosis    Migraine    Abnormal CT of the chest     Abnormal LFTs (liver function tests)    Abnormal results of cardiovascular function studies    ABHISHEK positive    ASCUS with positive high risk HPV cervical    Asthma    Ataxic gait    Atypical neuralgia    CAD S/P percutaneous coronary angioplasty    Chest pain    Chronic low back pain    CVA (cerebral vascular accident) (Multi)    Depression    Dizziness of unknown cause    Fatigue    GERD (gastroesophageal reflux disease)    Hemorrhoids    Hiatal hernia    Hypertension    Hypothyroidism    Lupus    Mixed hyperlipidemia    Nocturia    Osteoarthritis    Osteoarthritis of lumbar spine    Rectal bleeding    Rheumatoid arthritis    Tremors of nervous system    Type 2 diabetes mellitus with hyperglycemia (Multi)    Urinary frequency    Vitamin B deficiency    Vitamin D deficiency    Yeast dermatitis    Lower urinary tract infectious disease    Small vessel disease, cerebrovascular    Fibromyalgia    Acute sinusitis    Bronchitis, acute    Headache    URI, acute    Left-sided epistaxis    Postmenopausal bleeding    Skin rash    Breast lump    Bilateral otitis media with effusion    Class 2 obesity with body mass index (BMI) of 37.0 to 37.9 in adult    Medicare annual wellness visit, subsequent    BMI 36.0-36.9,adult    Former smoker    Lymphadenopathy    Angular cheilitis with candidiasis    Sinus congestion    Chest pain, unspecified       Assessment:   Unstable angina  History of CAD  Hypertension  Diabetes  Dyslipidemia  EF 45 to 50%    Plan:   Tele monitoring  Serial enzymes  2D echo  Daily EKGs  Aspirin 81 daily  Plavix 75 daily  Toprol-XL 25 daily  No ACE or ARB due to allergy  Norvasc 5 mg daily  Lipitor 40 mg daily  Add Imdur 60 mg daily  Left heart catheterization plus or minus PCI today [facility] a risk first benefits verbalized understanding would like to proceed with procedure    1 hour visit    [place] [facility]      Of note, this documentation is  completed using the Dragon Dictation system (voice recognition software). There may be spelling and/or grammatical errors that were not corrected prior to final submission.      Electronically signed by LUDWIG Spain, on 1/9/2025 at 11:22 AM

## 2025-01-09 NOTE — SIGNIFICANT EVENT
Pt transported to [address] [place] [address]. Charting/care continued on unit. Right radial site remains soft and stable with no hematoma or oozing.

## 2025-01-09 NOTE — SIGNIFICANT EVENT
Report called to [address] [facility]. Pt's  at bedside. Yellow folder placed into chart containing: Wound Care for Arterial Puncture Discharge Instructions/Restrictions, plavix education, cardiac rehab referral, medication education, and stent card. This folder is to be given to Pt with information reviewed before discharging home.

## 2025-01-09 NOTE — PROGRESS NOTES
Patient is stable status post LHC/PCI under the care of Dr. Blanchard.  Discussed results of procedure with patient.  Pictures provided.  Findings of the LHC revealed 75% stenosis in the distal circumflex artery, widely patent previous stent in the diagonal artery, mild diffuse disease of the RCA and a normal LVEF.  Patient underwent successful PCI with 1 RASHARD placed to the distal circumflex artery reducing that lesion down to 0% stenosis.  She tolerated the procedure well.  Please see procedural report for complete details.  Patient will continue DAPT with aspirin 81 mg daily and Plavix 75 mg daily.  She can be discharged home later today from a procedural/cardiology standpoint barring no complications.  Outpatient follow-up with Dr. Blanchard has been arranged.  Postprocedural activity, restrictions, potential complications, medications and future follow-up discussed at length.  All questions answered.  Patient verbalized understanding.

## 2025-01-09 NOTE — CARE PLAN
The patient's goals for the shift include  decreased pain    The clinical goals for the shift include to remain hds

## 2025-01-09 NOTE — H&P
ADMISSION DATE: 1/8/2025     CHIEF COMPLAINT:           HISTORY OF PRESENT ILLNESS.:   Lalita Dumont is a 65 y.o. female           PCP: Victoria Shelton MD    REVIEW OF SYSTEMS:  Denies fever or chills.  No dizziness, syncope, or seizures.  No headaches. No chest pain, chest tightness. No shortness of breath.  No nausea, vomiting, or diarrhea.  No rash or abnormal bleeding.  Denies ankle swelling, muscle aches.  No depression or anxiety symptoms.  Remainder of review of systems is negative and also as per HPI.       Past Medical History:   Diagnosis Date    Allergic     Arthritis     Asthma     CHF (congestive heart failure)     Coronary artery disease     Diabetes mellitus (Multi)     Disease of thyroid gland     Headache     Heart disease     Hypertension     Personal history of other diseases of the circulatory system     History of hypertension    Personal history of other diseases of the musculoskeletal system and connective tissue     History of rheumatoid arthritis    Personal history of other diseases of the respiratory system     History of asthma    Sleep apnea     Stroke (Multi)     Vitamin D deficiency, unspecified     Vitamin D deficiency      Past Surgical History:   Procedure Laterality Date    CARDIAC CATHETERIZATION      CARPAL TUNNEL RELEASE  09/24/2015    Neuroplasty Decompression Median Nerve At Carpal Tunnel    CORONARY STENT PLACEMENT      MR HEAD ANGIO WO IV CONTRAST  03/05/2020    MR HEAD ANGIO WO IV CONTRAST 3/5/2020 [facility]ICAL LEGAC[facility]    MR HEAD ANGIO WO IV CONTRAST  10/10/2023    MR HEAD ANGIO WO IV CONTRAST 10/10/2023 [facility]    MR NECK ANGIO WO IV CONTRAST  03/05/2020    MR NECK ANGIO WO IV CONTRAST 3/5/2020 [facility] CLINICAL LEGACY    OTHER SURGICAL HISTORY  10/23/2021    Dilation and curettage    OTHER SURGICAL HISTORY  10/23/2021    Colonoscopy    OTHER SURGICAL HISTORY  10/23/2021    Esophagogastroduodenoscopy    OTHER SURGICAL HISTORY  10/23/2021    Cardiac catheterization  "with stent placement    TUBAL LIGATION  2015    Tubal Ligation      Social History     Tobacco Use    Smoking status: Former     Current packs/day: 0.00     Types: Cigarettes     Quit date: 10/1/1989     Years since quittin.2    Smokeless tobacco: Never    Tobacco comments:     No longer an interest   Vaping Use    Vaping status: Never Used   Substance Use Topics    Alcohol use: Not Currently     Comment: Quit    Drug use: Never        CURRENT MEDICATIONS:  [START ON 2025] amLODIPine, 5 mg, oral, Daily  [START ON 2025] aspirin, 81 mg, oral, Daily  atorvastatin, 40 mg, oral, Nightly  [START ON 2025] cholecalciferol, 2,000 Units, oral, Daily  [START ON 2025] clopidogrel, 75 mg, oral, Daily  [START ON 2025] cyanocobalamin, 1,000 mcg, oral, Daily  [START ON 2025] glipiZIDE XL, 5 mg, oral, Daily  heparin (porcine), 5,000 Units, subcutaneous, q8h  [START ON 2025] hydroCHLOROthiazide, 12.5 mg, oral, Daily  [START ON 2025] insulin lispro, 0-20 Units, subcutaneous, Before meals & nightly  [START ON 2025] levothyroxine, 25 mcg, oral, Daily  [START ON 2025] pantoprazole, 40 mg, oral, Daily before breakfast   Or  [START ON 2025] pantoprazole, 40 mg, intravenous, Daily before breakfast  [START ON 2025] sertraline, 50 mg, oral, Daily  [START ON 2025] spironolactone, 25 mg, oral, Daily           VITALS:  Visit Vitals  /69   Pulse 77   Temp 36 °C (96.8 °F)   Resp 16   Ht 1.6 m (5' 3\")   Wt 90.7 kg (200 lb)   SpO2 94%   BMI 35.43 kg/m²   OB Status Hysterectomy   Smoking Status Former   BSA 2.01 m²        PHYSICAL EXAM:  HEENT:  Atraumatic, PERRL.  Conjunctivae clear.   Moist nasal mucous membranes. oropharynx non erythematous,   Neck:  Supple without thyromegaly or lymphadenopathy.  Lungs:  Clear to auscultation without rales, rhonchi, or rub.  Heart:  RRR, S1, S2, without M.  Abdomen:  Soft, non tender, no organ enlargement, bruit. Bowel sounds present . No CVA " tenderness.  Extremities:  No edema. No calf swelling or tenderness.    Skin:  No rash, ecchymosis or erythema.    LAB RESULTS:  CBC:   Results from last 7 days   Lab Units 01/08/25  1722   WBC AUTO x10*3/uL 5.0   RBC AUTO x10*6/uL 4.94   HEMOGLOBIN g/dL 14.5   HEMATOCRIT % 43.1   MCV fL 87   MCH pg 29.4   MCHC g/dL 33.6   RDW % 12.5   PLATELETS AUTO x10*3/uL 192     CMP:    Results from last 7 days   Lab Units 01/08/25  1722   SODIUM mmol/L 136   POTASSIUM mmol/L 3.7   CHLORIDE mmol/L 101   CO2 mmol/L 24   BUN mg/dL 23   CREATININE mg/dL 0.93   GLUCOSE mg/dL 163*   PROTEIN TOTAL g/dL 8.0   CALCIUM mg/dL 9.9   BILIRUBIN TOTAL mg/dL 1.0   ALK PHOS U/L 59   AST U/L 24   ALT U/L 22     BMP:    Results from last 7 days   Lab Units 01/08/25  1722   SODIUM mmol/L 136   POTASSIUM mmol/L 3.7   CHLORIDE mmol/L 101   CO2 mmol/L 24   BUN mg/dL 23   CREATININE mg/dL 0.93   CALCIUM mg/dL 9.9   GLUCOSE mg/dL 163*     Magnesium:    Troponin:    Results from last 7 days   Lab Units 01/08/25  1921 01/08/25  1722   TROPHS ng/L 4 3     BNP:     Lab Results   Component Value Date    HGBA1C 7.1 (A) 10/17/2024      Lab Results   Component Value Date    LDLCALC 81 04/05/2024        IMAGING:  CT angio chest for pulmonary embolism    Result Date: 1/8/2025  Interpreted By:  Luis Calabrese, STUDY: CT ANGIO CHEST FOR PULMONARY EMBOLISM;  1/8/2025 7:02 pm   INDICATION: Signs/Symptoms:+ dimer.   COMPARISON: None   ACCESSION NUMBER(S): [id number]   ORDERING CLINICIAN: HIRO EVANGELISTA   TECHNIQUE: Helical data acquisition of the chest was obtained after intravenous administration of , as per PE protocol. Images were reformatted in coronal and sagittal planes. Axial and coronal maximum intensity projection (MIP) images were created and reviewed.   FINDINGS: POTENTIAL LIMITATIONS OF THE STUDY: None   HEART AND VESSELS:   There are no discrete filling defects within main pulmonary artery and its branches to suggest acute pulmonary embolism.   Main  pulmonary artery and its branches are normal in caliber.   The thoracic aorta normal in course and caliber.   Coronary artery stent in place suspected. No significant coronary artery calcifications are seen. Please note, the study is not optimized for evaluation of coronary arteries.   The cardiac chambers are not enlarged.   3 mm ground-glass nodules within the minor fissure on image 124 and 129/279 similar to prior. Findings are nonspecific in etiology and may represent fissural lymph nodes   MEDIASTINUM AND RADHA, LOWER NECK AND AXILLA:   The visualized thyroid gland is within normal limits.   No evidence of thoracic lymphadenopathy by CT criteria.   Esophagus appears within normal limits as seen.   LUNGS AND AIRWAYS: The trachea and central airways are patent. No endobronchial lesion is seen.   The bilateral lungs are clear without evidence of focal consolidation, pleural effusion, or pneumothorax.     UPPER ABDOMEN: The visualized subdiaphragmatic structures demonstrate no remarkable findings.   CHEST WALL AND OSSEOUS STRUCTURES:   Chest wall is within normal limits. No acute osseous pathology.There are no suspicious osseous lesions.       1. Coronary artery stent in place. No evidence of acute pulmonary embolism. 2. Nonspecific pleural base ground-glass nodules within the right minor fissure similar to prior, stable and nonspecific in nature. Findings may represent fissural lymph node. 3. Additional detailed findings as above.     MACRO: None   Signed by: Luis Calabrese 1/8/2025 7:41 PM Dictation workstation:   [id number]    ECG 12 lead    Result Date: 1/8/2025  Normal sinus rhythm Left axis deviation Left bundle branch block Abnormal ECG When compared with ECG of 08-JAN-2025 17:02, (unconfirmed) No significant change was found    XR chest 1 view    Result Date: 1/8/2025  Interpreted By:  Sosa Lazaro, STUDY: XR CHEST 1 VIEW;  1/8/2025 5:18 pm   INDICATION: Signs/Symptoms:Chest Pain.   COMPARISON: Chest x-ray  02/02/2023   ACCESSION NUMBER(S): [id number]   ORDERING CLINICIAN: HIRO EVANGELISTA   FINDINGS:     CARDIOMEDIASTINAL SILHOUETTE: Cardiomediastinal silhouette is normal in size and configuration. Atherosclerotic calcification of the aorta.   LUNGS: No consolidation, pleural effusion or pneumothorax.   ABDOMEN: No remarkable upper abdominal findings.   BONES: Multilevel degenerative changes of the spine.       No acute cardiopulmonary process.   MACRO: None   Signed by: Sosa Lazaro 1/8/2025 5:55 PM Dictation workstation:   [id number]    ECG 12 lead    Result Date: 1/8/2025  Normal sinus rhythm Left axis deviation Left bundle branch block Abnormal ECG When compared with ECG of 09-OCT-2023 21:17, Previous ECG has undetermined rhythm, needs review    === 10/09/23 ===    MR BRAIN WO CONTRAST    - Impression -  No acute intracranial abnormality. Moderate microangiopathic disease,  unchanged from previous MRI 03/05/2020.    Intracranial vasculature is within normal limits. No large vessel  occlusion, high-grade stenosis, or aneurysm.    Signed by: Alfredo Bhakta 10/10/2023 11:07 AM  Dictation workstation:   [id number]  === 01/08/25 ===    CT ANGIO CHEST FOR PULMONARY EMBOLISM    - Impression -  1. Coronary artery stent in place. No evidence of acute pulmonary  embolism.  2. Nonspecific pleural base ground-glass nodules within the right  minor fissure similar to prior, stable and nonspecific in nature.  Findings may represent fissural lymph node.  3. Additional detailed findings as above.      MACRO:  None    Signed by: Luis Calabrese 1/8/2025 7:41 PM  Dictation workstation:   [id number]    Results for orders placed in visit on 11/11/19    ECHOCARDIOGRAM    Narrative  Ordered by an unspecified provider.           PROBLEM LIST ON ADMISSION:  Chest pain, unspecified [R07.9]  Chest pain, unspecified type [R07.9]     CHRONIC MEDICAL CONDITIONS:   Assessment & Plan  Chest pain, unspecified             PLAN ON  ADMISSION:        *Some of this note was completed using Dragon voice recognition technology and may include unintended errors with respect to translation of words, typographical errors or grammar errors which may not have been identified prior to finalization of the chart note.

## 2025-01-09 NOTE — PROGRESS NOTES
Physical Therapy    Physical Therapy Evaluation    Patient Name: Lalita Dumont  MRN: [id number]  Today's Date: 1/9/2025   Time Calculation  Start Time: 0917  Stop Time: 0926  Time Calculation (min): 9 min  ELYCVEPINVPool/ELY Card *    Assessment/Plan   PT Assessment  End of Session Communication: Bedside nurse  Assessment Comment: Pt currently independent with mobility and does not meet requirements for acute PT.  End of Session Patient Position: Bed, 2 rail up, Alarm off, not on at start of session  IP OR SWING BED PT PLAN  Inpatient or Swing Bed: Inpatient  PT Plan  PT Plan: PT Eval only  PT Eval Only Reason: No acute PT needs identified  PT Frequency: PT eval only  PT Discharge Recommendations: No further acute PT  PT Recommended Transfer Status: Independent  PT - OK to Discharge: Yes (PT eval complete, ok to d/c once deemed medically appropriate.)    Subjective     Current Problem:  1. Chest pain, unspecified type  Transthoracic Echo (TTE) Complete    Transthoracic Echo (TTE) Complete      2. Other chest pain  Transthoracic Echo (TTE) Complete    Transthoracic Echo (TTE) Complete      3. CAD S/P percutaneous coronary angioplasty  Transthoracic Echo (TTE) Complete    Transthoracic Echo (TTE) Complete      4. Angina pectoris, unstable (Multi)  Case Request Cath Lab: Left Heart Cath    Case Request Cath Lab: Left Heart Cath    Cardiac Catheterization Procedure    Cardiac Catheterization Procedure      5. Angina pectoris, unspecified  Cardiac Catheterization Procedure        Patient Active Problem List   Diagnosis    Migraine    Abnormal CT of the chest    Abnormal LFTs (liver function tests)    Abnormal results of cardiovascular function studies    ABHISHEK positive    ASCUS with positive high risk HPV cervical    Asthma    Ataxic gait    Atypical neuralgia    CAD S/P percutaneous coronary angioplasty    Chest pain    Chronic low back pain    CVA (cerebral vascular accident) (Multi)    Depression    Dizziness of unknown  cause    Fatigue    GERD (gastroesophageal reflux disease)    Hemorrhoids    Hiatal hernia    Hypertension    Hypothyroidism    Lupus    Mixed hyperlipidemia    Nocturia    Osteoarthritis    Osteoarthritis of lumbar spine    Rectal bleeding    Rheumatoid arthritis    Tremors of nervous system    Type 2 diabetes mellitus with hyperglycemia (Multi)    Urinary frequency    Vitamin B deficiency    Vitamin D deficiency    Yeast dermatitis    Lower urinary tract infectious disease    Small vessel disease, cerebrovascular    Fibromyalgia    Acute sinusitis    Bronchitis, acute    Headache    URI, acute    Left-sided epistaxis    Postmenopausal bleeding    Skin rash    Breast lump    Bilateral otitis media with effusion    Class 2 obesity with body mass index (BMI) of 37.0 to 37.9 in adult    Medicare annual wellness visit, subsequent    BMI 36.0-36.9,adult    Former smoker    Lymphadenopathy    Angular cheilitis with candidiasis    Sinus congestion    Chest pain, unspecified    Angina pectoris, unstable (Multi)       General Visit Information:  General  Reason for Referral: impaired mobility  Referred By: Nabila OT/PT 1/8  Past Medical History Relevant to Rehab: CVA, DM, CAD, GERD, 2 cardiac stents, fibromyalgia, L eye blindness  Family/Caregiver Present: No  Co-Treatment: OT  Co-Treatment Reason: to maximize pt. safety  Prior to Session Communication: Bedside nurse  Patient Position Received: Bed, 2 rail up, Alarm off, not on at start of session  General Comment: Pt. is 64 y/o female to ED with chest pain. CXR (-), CT angio chest: GG nodules in R lobe. D-dimer: 579. Troponin (-)    Home Living:  Home Living  Home Living Comments: Pt. lives with  and her sons family (wife and 3 grandkids). Pt. lives in two [place], [address] [facility] with [facility]. one floor set up with tub shower and grab bars.    Prior Level of Function:  Prior Function Per Pt/Caregiver Report  Prior Function Comments: No AD use, owns RW. Pt. is independent  "with ADLs/IADLs. No falls. Does not drive; blind in L eye.        Vital Signs:     Objective     Pain:  Pain Assessment  Pain Assessment: 0-10  0-10 (Numeric) Pain Score: 4  Pain Type: Acute pain  Pain Location: Head (\"tightness\" in chest)    Cognition:  Cognition  Overall Cognitive Status: Within Functional Limits  Orientation Level: Oriented X4    General Assessments:      Activity Tolerance  Endurance: Endurance does not limit participation in activity     Strength  Strength Comments: BLE MMT 4+/5     Coordination  Movements are Fluid and Coordinated: Yes  Coordination Comment: WNL     Static Sitting Balance  Static Sitting-Comment/Number of Minutes: G  Dynamic Sitting Balance  Dynamic Sitting-Comments: G  Static Standing Balance  Static Standing-Comment/Number of Minutes: G  Dynamic Standing Balance  Dynamic Standing-Comments: G    Functional Assessments:     Bed Mobility  Bed Mobility:  (sup to sit/sit to sup independent)  Transfers  Transfer:  (sit to stand/stand to sit ; Independent. no AD)  Ambulation/Gait Training  Ambulation/Gait Training Performed:  (ambulates 30' without device, independent)          Extremity/Trunk Assessments:        RLE   RLE : Within Functional Limits  LLE   LLE : Within Functional Limits    Outcome Measures:     [facility] Basic Mobility  Turning from your back to your side while in a flat bed without using bedrails: None  Moving from lying on your back to sitting on the side of a flat bed without using bedrails: None  Moving to and from bed to chair (including a wheelchair): None  Standing up from a chair using your arms (e.g. wheelchair or bedside chair): None  To walk in hospital room: None  Climbing 3-5 steps with railing: None  Basic Mobility - Total Score: 24                                                                     "

## 2025-01-09 NOTE — CONSULTS
Cardiology Consult Note      Date:   1/9/2025  Patient name:  Lalita Dumont  Date of admission:  1/8/2025  5:04 PM  MRN:   [id number]  YOB: 1960  Time of Consult:  11:22 AM    Consulting Cardiologist:    CARMEL Gr CNP  Primary Cardiologist:  Dr. Tae Blanchard    Referring Provider: Dr Dahl      Admission Diagnosis:     Chest pain, unspecified      History of Present Illness:      Lalita Dumont is a 65 y.o.  female patient who is being at the request of Dr. Dahl for inpatient consultation of angina. She was admitted on 1/8/2025.  Previous [facility] and [facility] records have been reviewed in detail.    Patient with a history of diabetes, CAD, hypertension, dyslipidemia  Patient states that she was sitting at home on Monday on the couch was not doing anything and she got sudden onset of shortness of breath and chest pain she described it as actually someone squeezing her chest she states that she took 2 nitro total and the pain went from a 5 down to a 0.  Then on Tuesday she was sitting down not doing anything chest pain came back she took another nitro so she decided to come into the hospital.  Last night when she was sleeping in the hospital she got woken up by having the chest pain in the center of her chest.  She states that in the last week or 2 she has had increased fatigue just not quite feeling like herself.  Positive for chest pain, shortness of breath, fatigue  Denies nausea, vomiting, PND, orthopnea, claudications  Throughout length in regards to this chest pain she said this is very out of the ordinary for her but this chest pain is very different it is coming much more frequent and it is coming even at night.    EKG is normal sinus rhythm will at the left bundle branch block  Cholesterol 121  Troponin 4, 3  Magnesium 2.13    Cardiac history  CONCLUSIONS:   1. The left ventricular systolic function is mildly decreased, with a visually estimated ejection fraction of 45-50%.    2. There is global hypokinesis of the left ventricle with minor regional variations.   3. Left ventricular diastolic filling was indeterminate.   4. There is normal right ventricular global systolic function.   5. Trace MR.   6. Right ventricular systolic pressure is within normal limits.   7. Trace AI, No aortic stenosis.   8. Normal valve function with trivial clinically insignificant AI/MR.   9. Prior echo studies unavailable for comparison at this time.     7/30/2020  CONCLUSIONS:   1. Left Main Coronary Artery: This artery is normal.   2. Left Anterior Descending Artery: contains patent previously placed stents and presents luminal irregularities.   3. Circumflex Coronary Artery: presents luminal irregularities and contains patent previously placed stents.   4. Distal CX Lesion: The percent stenosis is 50%.   5. Right Coronary Artery: presents luminal irregularities.   6. Proximal RCA Lesion: The percent stenosis is 50%.   7. The Left Ventricular Ejection Fraction is 55%.         Allergies:     Allergies   Allergen Reactions    Bactrim [Sulfamethoxazole-Trimethoprim] GI Upset    Codeine Swelling    Darvocet A500 [Propoxyphene N-Acetaminophen] Hives    Erythromycin Itching and Swelling    Lisinopril Swelling    Omeprazole Hives    Penicillins Swelling    Azithromycin Rash         Past Medical History:     Past Medical History:   Diagnosis Date    Allergic     Arthritis     Asthma     CHF (congestive heart failure)     Coronary artery disease     Diabetes mellitus (Multi)     Disease of thyroid gland     Headache     Heart disease     Hypertension     Personal history of other diseases of the circulatory system     History of hypertension    Personal history of other diseases of the musculoskeletal system and connective tissue     History of rheumatoid arthritis    Personal history of other diseases of the respiratory system     History of asthma    Sleep apnea     Stroke (Multi)     Vitamin D deficiency,  unspecified     Vitamin D deficiency       Past Surgical History:     Past Surgical History:   Procedure Laterality Date    CARDIAC CATHETERIZATION      CARPAL TUNNEL RELEASE  2015    Neuroplasty Decompression Median Nerve At Carpal Tunnel    CORONARY STENT PLACEMENT      MR HEAD ANGIO WO IV CONTRAST  2020    MR HEAD ANGIO WO IV CONTRAST 3/5/2020 [facility]    MR HEAD ANGIO WO IV CONTRAST  10/10/2023    MR HEAD ANGIO WO IV CONTRAST 10/10/2023 [facility]    MR NECK ANGIO WO IV CONTRAST  2020    MR NECK ANGIO WO IV CONTRAST 3/5/2020 [facility] CL[facility]ICAL LEGAC[facility]    OTHER SURGICAL HISTORY  10/23/2021    Dilation and curettage    OTHER SURGICAL HISTORY  10/23/2021    Colonoscopy    OTHER SURGICAL HISTORY  10/23/2021    Esophagogastroduodenoscopy    OTHER SURGICAL HISTORY  10/23/2021    Cardiac catheterization with stent placement    TUBAL LIGATION  2015    Tubal Ligation       Family History:     Family History   Problem Relation Name Age of Onset    Cancer Mother Stephanie     Other (acute myocardial infarction) Father Kal     Heart disease Father Kal     Cancer Sister Teresa     Cancer Brother Kal     Diabetes Brother Kal     Cancer Maternal Grandmother      Cancer Maternal Grandfather Juan Luis     Cancer Paternal Grandfather Que     Diabetes Paternal Grandfather Que     Diabetes Other paternal aunt     Cancer Paternal Grandmother Amanda     Diabetes Father's Sister Fiorella     Diabetes Brother Jay     Diabetes Brother Mumtaz     Mental illness Brother Mumtaz     Diabetes Father's Sister Fiorella     Diabetes Brother Jay     Diabetes Brother Mumtaz     Mental illness Brother Mumtaz        Social History:     Social History     Tobacco Use    Smoking status: Former     Current packs/day: 0.00     Types: Cigarettes     Quit date: 10/1/1989     Years since quittin.2    Smokeless tobacco: Never    Tobacco comments:     No longer an interest   Vaping Use    Vaping status:  Never Used   Substance Use Topics    Alcohol use: Not Currently     Comment: Quit    Drug use: Never       CURRENT INPATIENT MEDICATIONS    amLODIPine, 5 mg, oral, Daily  aspirin, 81 mg, oral, Daily  atorvastatin, 40 mg, oral, Nightly  cholecalciferol, 2,000 Units, oral, Daily  clopidogrel, 75 mg, oral, Daily  cyanocobalamin, 1,000 mcg, oral, Daily  famotidine, 20 mg, oral, BID   Or  famotidine, 20 mg, intravenous, BID  glipiZIDE XL, 5 mg, oral, Daily  heparin (porcine), 5,000 Units, subcutaneous, q8h  [Held by provider] hydroCHLOROthiazide, 12.5 mg, oral, Daily  insulin lispro, 0-20 Units, subcutaneous, Before meals & nightly  isosorbide mononitrate ER, 60 mg, oral, Daily  levothyroxine, 25 mcg, oral, Daily  metoprolol succinate XL, 25 mg, oral, Daily  morphine, 2 mg, intravenous, Once  sertraline, 50 mg, oral, Daily  sodium chloride, 250 mL, intravenous, Once  spironolactone, 25 mg, oral, Daily         Current Outpatient Medications   Medication Instructions    acetaminophen (TYLENOL) 650 mg, Every 4 hours PRN    albuterol 90 mcg/actuation inhaler 2 puffs, Every 6 hours PRN    amLODIPine (NORVASC) 5 mg, oral, Daily    aspirin 81 mg EC tablet 1 tablet, Daily    atorvastatin (LIPITOR) 40 mg, oral, Daily    cholecalciferol (VITAMIN D-3) 2,000 Units, 2 times daily    clopidogrel (PLAVIX) 75 mg, oral, Daily    clotrimazole-betamethasone (Lotrisone) cream Apply sparingly to corners of mouth twice a day for up to 2 weeks.  Avoid ingestion.    cyanocobalamin (Vitamin B-12) 1,000 mcg tablet 1 tablet, Daily    glipiZIDE XL (GLUCOTROL XL) 5 mg, oral, Daily, Do not crush, chew, or split.    hydroCHLOROthiazide (MICROZIDE) 12.5 mg, oral, Daily    ipratropium-albuteroL (Duo-Neb) 0.5-2.5 mg/3 mL nebulizer solution 3 mL, Every 4 hours PRN    levothyroxine (SYNTHROID, LEVOXYL) 25 mcg, oral, Daily    nitroglycerin (NITROSTAT) 0.4 mg, sublingual, Every 5 min PRN    PlaqueniL 400 mg, Daily    Refresh Optive Advanced 0.5-1-0.5 % drops  "2 drops, Daily    sertraline (ZOLOFT) 50 mg, oral, Daily    spironolactone (ALDACTONE) 25 mg, oral, Daily        Review of Systems:      12 point review of systems was obtained in detail and is negative other than that detailed above.    Vital Signs:     Vitals:    01/08/25 2222 01/08/25 2359 01/09/25 0347 01/09/25 0744   BP: 141/69 118/56 126/66 118/61   BP Location: Right arm Right arm Right arm Right arm   Patient Position: Sitting Lying Lying Lying   Pulse: 77 66 63 61   Resp: 18 17 16 17   Temp: 36 °C (96.8 °F) 36 °C (96.8 °F) 36.1 °C (97 °F) 35.6 °C (96.1 °F)   TempSrc: Temporal Temporal Temporal Temporal   SpO2: 94% 93% 93% 94%   Weight: 92.5 kg (203 lb 14.8 oz)      Height: 1.6 m (5' 3\")        No intake or output data in the 24 hours ending 01/09/25 1122    Wt Readings from Last 4 Encounters:   01/08/25 92.5 kg (203 lb 14.8 oz)   10/24/24 90.5 kg (199 lb 9.6 oz)   10/17/24 91.1 kg (200 lb 12.8 oz)   06/02/24 89.4 kg (197 lb)       Physical Examination:     GENERAL APPEARANCE: Well developed, well nourished, in no acute distress.  CHEST: Symmetric and non-tender.  INTEGUMENT: Skin warm and dry, without gross excoriationis or lesions.  HEENT: No gross abnormalities of conjunctiva, teeth, gums, oral mucosa  NECK: Supple, no JVD, no bruit. Thyroid not palpable. Carotid upstrokes normal.  NEURO/PSHCY: Alert and oriented x3; appropriate behavior and responses and responses, grossly normal cerebellar function with normal balance and coordination  LUNGS: Clear to auscultation bilaterally; normal respiratory effort.  HEART: Rate and rhythm regular with no evident murmur; no gallop appreciated. There are no rubs, clicks or heaves. PMI nondisplaced.  ABDOMEN: Soft, nontender, no palpable hepatosplenomegaly, no mases, no bruits. Abdominal aorta not noted to be enlarged.  MUSCULOSKELETAL: Ambulatory with normal tandem gait.  EXTREMITIES: Warm with good color, no clubbing or cyanois. There is no edema noted.  PERIPHERAL " VASCULAR: Pulses present and equally palpable; 2+ throughout. No femoral bruits.      Lab:     CBC:   Results from last 7 days   Lab Units 01/09/25  0609 01/08/25  1722   WBC AUTO x10*3/uL 4.6 5.0   RBC AUTO x10*6/uL 4.62 4.94   HEMOGLOBIN g/dL 13.8 14.5   HEMATOCRIT % 41.2 43.1   MCV fL 89 87   MCH pg 29.9 29.4   MCHC g/dL 33.5 33.6   RDW % 12.7 12.5   PLATELETS AUTO x10*3/uL 179 192     CMP:    Results from last 7 days   Lab Units 01/09/25  0609 01/08/25  1722   SODIUM mmol/L 137 136   POTASSIUM mmol/L 3.6 3.7   CHLORIDE mmol/L 101 101   CO2 mmol/L 27 24   BUN mg/dL 23 23   CREATININE mg/dL 1.03 0.93   GLUCOSE mg/dL 165* 163*   PROTEIN TOTAL g/dL 7.2 8.0   CALCIUM mg/dL 9.5 9.9   BILIRUBIN TOTAL mg/dL 1.0 1.0   ALK PHOS U/L 53 59   AST U/L 21 24   ALT U/L 20 22     BMP:    Results from last 7 days   Lab Units 01/09/25  0609 01/08/25  1722   SODIUM mmol/L 137 136   POTASSIUM mmol/L 3.6 3.7   CHLORIDE mmol/L 101 101   CO2 mmol/L 27 24   BUN mg/dL 23 23   CREATININE mg/dL 1.03 0.93   CALCIUM mg/dL 9.5 9.9   GLUCOSE mg/dL 165* 163*     Magnesium:  Results from last 7 days   Lab Units 01/09/25  0609   MAGNESIUM mg/dL 2.13     Troponin:    Results from last 7 days   Lab Units 01/08/25  2354 01/08/25  2300 01/08/25  1921   TROPHS ng/L 3 4 4     BNP:     Lipid Panel:  Results from last 7 days   Lab Units 01/09/25  0609   HDL mg/dL 36.9   CHOLESTEROL/HDL RATIO  3.3   VLDL mg/dL 35   TRIGLYCERIDES mg/dL 173*   NON HDL CHOL. mg/dL 84        Diagnostic Studies:   @No results found for this or any previous visit.      CT angio chest for pulmonary embolism    Result Date: 1/8/2025  Interpreted By:  Luis Calabrese, STUDY: CT ANGIO CHEST FOR PULMONARY EMBOLISM;  1/8/2025 7:02 pm   INDICATION: Signs/Symptoms:+ dimer.   COMPARISON: None   ACCESSION NUMBER(S): [id number]   ORDERING CLINICIAN: HIRO EVANGELISTA   TECHNIQUE: Helical data acquisition of the chest was obtained after intravenous administration of , as per PE protocol.  Images were reformatted in coronal and sagittal planes. Axial and coronal maximum intensity projection (MIP) images were created and reviewed.   FINDINGS: POTENTIAL LIMITATIONS OF THE STUDY: None   HEART AND VESSELS:   There are no discrete filling defects within main pulmonary artery and its branches to suggest acute pulmonary embolism.   Main pulmonary artery and its branches are normal in caliber.   The thoracic aorta normal in course and caliber.   Coronary artery stent in place suspected. No significant coronary artery calcifications are seen. Please note, the study is not optimized for evaluation of coronary arteries.   The cardiac chambers are not enlarged.   3 mm ground-glass nodules within the minor fissure on image 124 and 129/279 similar to prior. Findings are nonspecific in etiology and may represent fissural lymph nodes   MEDIASTINUM AND RADHA, LOWER NECK AND AXILLA:   The visualized thyroid gland is within normal limits.   No evidence of thoracic lymphadenopathy by CT criteria.   Esophagus appears within normal limits as seen.   LUNGS AND AIRWAYS: The trachea and central airways are patent. No endobronchial lesion is seen.   The bilateral lungs are clear without evidence of focal consolidation, pleural effusion, or pneumothorax.     UPPER ABDOMEN: The visualized subdiaphragmatic structures demonstrate no remarkable findings.   CHEST WALL AND OSSEOUS STRUCTURES:   Chest wall is within normal limits. No acute osseous pathology.There are no suspicious osseous lesions.       1. Coronary artery stent in place. No evidence of acute pulmonary embolism. 2. Nonspecific pleural base ground-glass nodules within the right minor fissure similar to prior, stable and nonspecific in nature. Findings may represent fissural lymph node. 3. Additional detailed findings as above.     MACRO: None   Signed by: Luis Calabrese 1/8/2025 7:41 PM Dictation workstation:   [id number]    ECG 12 lead    Result Date: 1/8/2025  Normal  sinus rhythm Left axis deviation Left bundle branch block Abnormal ECG When compared with ECG of 08-JAN-2025 17:02, (unconfirmed) No significant change was found    XR chest 1 view    Result Date: 1/8/2025  Interpreted By:  Sosa Lazaro, STUDY: XR CHEST 1 VIEW;  1/8/2025 5:18 pm   INDICATION: Signs/Symptoms:Chest Pain.   COMPARISON: Chest x-ray 02/02/2023   ACCESSION NUMBER(S): [id number]   ORDERING CLINICIAN: HIRO EVANGELISTA   FINDINGS:     CARDIOMEDIASTINAL SILHOUETTE: Cardiomediastinal silhouette is normal in size and configuration. Atherosclerotic calcification of the aorta.   LUNGS: No consolidation, pleural effusion or pneumothorax.   ABDOMEN: No remarkable upper abdominal findings.   BONES: Multilevel degenerative changes of the spine.       No acute cardiopulmonary process.   MACRO: None   Signed by: Sosa Lazaro 1/8/2025 5:55 PM Dictation workstation:   [id number]    ECG 12 lead    Result Date: 1/8/2025  Normal sinus rhythm Left axis deviation Left bundle branch block Abnormal ECG When compared with ECG of 09-OCT-2023 21:17, Previous ECG has undetermined rhythm, needs review      Radiology:     Transthoracic Echo (TTE) Complete   Final Result      CT angio chest for pulmonary embolism   Final Result   1. Coronary artery stent in place. No evidence of acute pulmonary   embolism.   2. Nonspecific pleural base ground-glass nodules within the right   minor fissure similar to prior, stable and nonspecific in nature.   Findings may represent fissural lymph node.   3. Additional detailed findings as above.             MACRO:   None        Signed by: Luis Calabrese 1/8/2025 7:41 PM   Dictation workstation:   [id number]      XR chest 1 view   Final Result   No acute cardiopulmonary process.        MACRO:   None        Signed by: Sosa Lazaro 1/8/2025 5:55 PM   Dictation workstation:   [id number]          Problem List:     Patient Active Problem List   Diagnosis    Migraine    Abnormal CT of the chest     Abnormal LFTs (liver function tests)    Abnormal results of cardiovascular function studies    ABHISHEK positive    ASCUS with positive high risk HPV cervical    Asthma    Ataxic gait    Atypical neuralgia    CAD S/P percutaneous coronary angioplasty    Chest pain    Chronic low back pain    CVA (cerebral vascular accident) (Multi)    Depression    Dizziness of unknown cause    Fatigue    GERD (gastroesophageal reflux disease)    Hemorrhoids    Hiatal hernia    Hypertension    Hypothyroidism    Lupus    Mixed hyperlipidemia    Nocturia    Osteoarthritis    Osteoarthritis of lumbar spine    Rectal bleeding    Rheumatoid arthritis    Tremors of nervous system    Type 2 diabetes mellitus with hyperglycemia (Multi)    Urinary frequency    Vitamin B deficiency    Vitamin D deficiency    Yeast dermatitis    Lower urinary tract infectious disease    Small vessel disease, cerebrovascular    Fibromyalgia    Acute sinusitis    Bronchitis, acute    Headache    URI, acute    Left-sided epistaxis    Postmenopausal bleeding    Skin rash    Breast lump    Bilateral otitis media with effusion    Class 2 obesity with body mass index (BMI) of 37.0 to 37.9 in adult    Medicare annual wellness visit, subsequent    BMI 36.0-36.9,adult    Former smoker    Lymphadenopathy    Angular cheilitis with candidiasis    Sinus congestion    Chest pain, unspecified       Assessment:   Unstable angina  History of CAD  Hypertension  Diabetes  Dyslipidemia  EF 45 to 50%    Plan:   Tele monitoring  Serial enzymes  2D echo  Daily EKGs  Aspirin 81 daily  Plavix 75 daily  Toprol-XL 25 daily  No ACE or ARB due to allergy  Norvasc 5 mg daily  Lipitor 40 mg daily  Add Imdur 60 mg daily  Left heart catheterization plus or minus PCI today Wellvone a risk first benefits verbalized understanding would like to proceed with procedure    1 hour visit    [facility]      Of note, this documentation is  completed using the Dragon Dictation system (voice recognition software). There may be spelling and/or grammatical errors that were not corrected prior to final submission.      Electronically signed by LUDWIG Spain, on 1/9/2025 at 11:22 AM

## 2025-01-09 NOTE — SIGNIFICANT EVENT
Vascband removed per physician order; quick clot/tegaderm applied to right radial site. Pt denies dizziness/lightheadedness/pain.

## 2025-01-09 NOTE — SIGNIFICANT EVENT
Report received and focused assessment performed; right radial site soft and stable with no hematoma or oozing. Vascband is in place. Pt denies dizziness/lightheadedness/pain but complains that she is hungry. Turkey sandwich box and diet gingerale given to Pt. Educated Pt on current restrictions r/t right radial arterial puncture, she states understanding and denies needs at this time.   53yo female pt with PMHx of Breast Ca (dx 2005 with mets to liver, lung, bone, last dose Cytoxan was 3 weeks ago), GERD p/w acute encephalopathy as well as SIRS. Unclear if encephalopathy more sepsis driven or opiate driven, likely multifactorial.

## 2025-01-09 NOTE — PROGRESS NOTES
Occupational Therapy    Evaluation    Patient Name: Lalita Dumont  MRN: [id number]  Department: [facility]  Room: [facility] Card *  Today's Date: 1/9/2025  Time Calculation  Start Time: 0917  Stop Time: 0926  Time Calculation (min): 9 min        Assessment:  OT Assessment: Independent  Prognosis: Good  End of Session Communication: Bedside nurse  End of Session Patient Position: Bed, 2 rail up, Alarm off, not on at start of session  Prognosis: Good  Plan:  No Skilled OT: No acute OT goals identified  OT Frequency: OT eval only  OT - OK to Discharge: Yes (when medically stable/cleared)     Subjective   Current Problem:  1. Chest pain, unspecified type  Transthoracic Echo (TTE) Complete    Transthoracic Echo (TTE) Complete      2. Other chest pain  Transthoracic Echo (TTE) Complete    Transthoracic Echo (TTE) Complete      3. CAD S/P percutaneous coronary angioplasty  Transthoracic Echo (TTE) Complete    Transthoracic Echo (TTE) Complete      4. Angina pectoris, unstable (Multi)  Case Request Cath Lab: Left Heart Cath    Case Request Cath Lab: Left Heart Cath    Cardiac Catheterization Procedure    Cardiac Catheterization Procedure        General:  General  Reason for Referral: ADL impairment  Referred By: Nabila OT/PT 1/8  Past Medical History Relevant to Rehab: CVA, DM, CAD, GERD, 2 cardiac stents, fibromyalgia, L eye blindness  Family/Caregiver Present: No  Co-Treatment: PT  Co-Treatment Reason: to maximize pt. safety  Prior to Session Communication: Bedside nurse  Patient Position Received: Bed, 2 rail up, Alarm off, not on at start of session  General Comment: Pt. is 64 y/o female to ED with chest pain. CXR (-), CT angio chest: GG nodules in R lobe. D-dimer: 579. Troponin (-)    Pain:  Pain Assessment  Pain Assessment: 0-10  0-10 (Numeric) Pain Score: 4  Pain Type: Acute pain  Pain Location: Head (and chest)    Objective   Cognition:  Overall Cognitive Status: Within Functional Limits  Orientation  Level: Oriented X4    Home Living:  Home Living Comments: Pt. lives with  and her sons family (wife and 3 grandkids). Pt. lives in two story house, 2 JARED with HRs. one floor set up with tub shower and grab bars.  Prior Function:  Prior Function Comments: No AD use, owns RW. Pt. is independent with ADLs/IADLs. No falls. Does not drive; blind in L eye.    ADL:  Eating Assistance: Independent  Grooming Assistance: Independent  Bathing Assistance: Independent  UE Dressing Assistance: Independent  LE Dressing Assistance: Independent  Toileting Assistance with Device: Independent  Activity Tolerance:  Endurance: Endurance does not limit participation in activity  Bed Mobility/Transfers: Bed Mobility  Bed Mobility:  (sup to sit/sit to sup independent)    Transfers  Transfer:  (sit to stand/stand to sit ; Independent. no AD)    Functional Mobility:  Functional Mobility  Functional Mobility Performed:  (Functional mobility room and hallway no AD, independent)  Sitting Balance:  Static Sitting Balance  Static Sitting-Level of Assistance: Independent  Standing Balance:  Dynamic Standing Balance  Dynamic Standing-Comments: Independent; No AD     Vision:Vision - Basic Assessment  Current Vision:  (Pt. reports blindness in L eye)    Strength:  Strength Comments: Bibi 5/5 MMT    Coordination:  Movements are Fluid and Coordinated: Yes  Coordination Comment: WNL   Hand Function:  Gross Grasp: Functional  Extremities: RUE   RUE : Within Functional Limits and LUE   LUE: Within Functional Limits    Outcome Measures:[facility] Daily Activity  Putting on and taking off regular lower body clothing: None  Bathing (including washing, rinsing, drying): None  Putting on and taking off regular upper body clothing: None  Toileting, which includes using toilet, bedpan or urinal: None  Taking care of personal grooming such as brushing teeth: None  Eating Meals: None  Daily Activity - Total Score: 24    Education Documentation  ADL Training,  taught by Millie Borden OT at 1/9/2025  1:09 PM.  Learner: Patient  Readiness: Acceptance  Method: Explanation  Response: Verbalizes Understanding, Demonstrated Understanding    IP EDUCATION:  Education  Individual(s) Educated: Patient  Education Provided: Fall precautons, POC discussed and agreed upon, Risk and benefits of OT discussed with patient or other

## 2025-01-10 ENCOUNTER — APPOINTMENT (OUTPATIENT)
Dept: CARDIOLOGY | Facility: HOSPITAL | Age: 65
End: 2025-01-10
Payer: COMMERCIAL

## 2025-01-10 VITALS
SYSTOLIC BLOOD PRESSURE: 115 MMHG | BODY MASS INDEX: 36.13 KG/M2 | HEART RATE: 62 BPM | RESPIRATION RATE: 18 BRPM | TEMPERATURE: 97 F | HEIGHT: 63 IN | DIASTOLIC BLOOD PRESSURE: 57 MMHG | WEIGHT: 203.93 LBS | OXYGEN SATURATION: 93 %

## 2025-01-10 LAB
ALBUMIN SERPL BCP-MCNC: 4.2 G/DL (ref 3.4–5)
ALP SERPL-CCNC: 52 U/L (ref 33–136)
ALT SERPL W P-5'-P-CCNC: 19 U/L (ref 7–45)
ANION GAP SERPL CALC-SCNC: 13 MMOL/L (ref 10–20)
AST SERPL W P-5'-P-CCNC: 19 U/L (ref 9–39)
BASOPHILS # BLD AUTO: 0.03 X10*3/UL (ref 0–0.1)
BASOPHILS NFR BLD AUTO: 0.8 %
BILIRUB SERPL-MCNC: 0.8 MG/DL (ref 0–1.2)
BUN SERPL-MCNC: 18 MG/DL (ref 6–23)
CALCIUM SERPL-MCNC: 9.4 MG/DL (ref 8.6–10.3)
CHLORIDE SERPL-SCNC: 102 MMOL/L (ref 98–107)
CO2 SERPL-SCNC: 28 MMOL/L (ref 21–32)
CREAT SERPL-MCNC: 0.98 MG/DL (ref 0.5–1.05)
EGFRCR SERPLBLD CKD-EPI 2021: 63 ML/MIN/1.73M*2
EOSINOPHIL # BLD AUTO: 0.1 X10*3/UL (ref 0–0.7)
EOSINOPHIL NFR BLD AUTO: 2.5 %
ERYTHROCYTE [DISTWIDTH] IN BLOOD BY AUTOMATED COUNT: 12.5 % (ref 11.5–14.5)
GLUCOSE BLD MANUAL STRIP-MCNC: 150 MG/DL (ref 74–99)
GLUCOSE SERPL-MCNC: 135 MG/DL (ref 74–99)
HCT VFR BLD AUTO: 43.2 % (ref 36–46)
HGB BLD-MCNC: 13.9 G/DL (ref 12–16)
IMM GRANULOCYTES # BLD AUTO: 0.01 X10*3/UL (ref 0–0.7)
IMM GRANULOCYTES NFR BLD AUTO: 0.3 % (ref 0–0.9)
LYMPHOCYTES # BLD AUTO: 1.01 X10*3/UL (ref 1.2–4.8)
LYMPHOCYTES NFR BLD AUTO: 25.3 %
MAGNESIUM SERPL-MCNC: 2.19 MG/DL (ref 1.6–2.4)
MCH RBC QN AUTO: 29.3 PG (ref 26–34)
MCHC RBC AUTO-ENTMCNC: 32.2 G/DL (ref 32–36)
MCV RBC AUTO: 91 FL (ref 80–100)
MONOCYTES # BLD AUTO: 0.51 X10*3/UL (ref 0.1–1)
MONOCYTES NFR BLD AUTO: 12.8 %
NEUTROPHILS # BLD AUTO: 2.33 X10*3/UL (ref 1.2–7.7)
NEUTROPHILS NFR BLD AUTO: 58.3 %
NRBC BLD-RTO: 0 /100 WBCS (ref 0–0)
PLATELET # BLD AUTO: 172 X10*3/UL (ref 150–450)
POTASSIUM SERPL-SCNC: 3.8 MMOL/L (ref 3.5–5.3)
PROT SERPL-MCNC: 7.2 G/DL (ref 6.4–8.2)
RBC # BLD AUTO: 4.74 X10*6/UL (ref 4–5.2)
SODIUM SERPL-SCNC: 139 MMOL/L (ref 136–145)
WBC # BLD AUTO: 4 X10*3/UL (ref 4.4–11.3)

## 2025-01-10 PROCEDURE — G0378 HOSPITAL OBSERVATION PER HR: HCPCS

## 2025-01-10 PROCEDURE — 2500000004 HC RX 250 GENERAL PHARMACY W/ HCPCS (ALT 636 FOR OP/ED): Performed by: NURSE PRACTITIONER

## 2025-01-10 PROCEDURE — 36415 COLL VENOUS BLD VENIPUNCTURE: CPT | Performed by: NURSE PRACTITIONER

## 2025-01-10 PROCEDURE — 96372 THER/PROPH/DIAG INJ SC/IM: CPT | Performed by: NURSE PRACTITIONER

## 2025-01-10 PROCEDURE — 85025 COMPLETE CBC W/AUTO DIFF WBC: CPT | Performed by: NURSE PRACTITIONER

## 2025-01-10 PROCEDURE — 2500000001 HC RX 250 WO HCPCS SELF ADMINISTERED DRUGS (ALT 637 FOR MEDICARE OP): Performed by: NURSE PRACTITIONER

## 2025-01-10 PROCEDURE — 2500000002 HC RX 250 W HCPCS SELF ADMINISTERED DRUGS (ALT 637 FOR MEDICARE OP, ALT 636 FOR OP/ED): Performed by: NURSE PRACTITIONER

## 2025-01-10 PROCEDURE — 83735 ASSAY OF MAGNESIUM: CPT | Performed by: NURSE PRACTITIONER

## 2025-01-10 PROCEDURE — 82947 ASSAY GLUCOSE BLOOD QUANT: CPT

## 2025-01-10 PROCEDURE — 80053 COMPREHEN METABOLIC PANEL: CPT | Performed by: NURSE PRACTITIONER

## 2025-01-10 PROCEDURE — 93005 ELECTROCARDIOGRAM TRACING: CPT

## 2025-01-10 RX ADMIN — METOPROLOL SUCCINATE 25 MG: 25 TABLET, EXTENDED RELEASE ORAL at 08:53

## 2025-01-10 RX ADMIN — SERTRALINE HYDROCHLORIDE 50 MG: 50 TABLET, FILM COATED ORAL at 08:53

## 2025-01-10 RX ADMIN — SPIRONOLACTONE 25 MG: 25 TABLET ORAL at 08:53

## 2025-01-10 RX ADMIN — CLOPIDOGREL BISULFATE 75 MG: 75 TABLET, FILM COATED ORAL at 08:53

## 2025-01-10 RX ADMIN — ISOSORBIDE MONONITRATE 60 MG: 60 TABLET, EXTENDED RELEASE ORAL at 08:53

## 2025-01-10 RX ADMIN — HEPARIN SODIUM 5000 UNITS: 5000 INJECTION INTRAVENOUS; SUBCUTANEOUS at 08:53

## 2025-01-10 RX ADMIN — ASPIRIN 81 MG: 81 TABLET, COATED ORAL at 08:53

## 2025-01-10 RX ADMIN — GLIPIZIDE 5 MG: 2.5 TABLET, EXTENDED RELEASE ORAL at 08:53

## 2025-01-10 RX ADMIN — Medication 2000 UNITS: at 08:53

## 2025-01-10 RX ADMIN — HEPARIN SODIUM 5000 UNITS: 5000 INJECTION INTRAVENOUS; SUBCUTANEOUS at 00:06

## 2025-01-10 RX ADMIN — Medication 1000 MCG: at 08:53

## 2025-01-10 RX ADMIN — AMLODIPINE BESYLATE 5 MG: 5 TABLET ORAL at 08:53

## 2025-01-10 RX ADMIN — LEVOTHYROXINE SODIUM 25 MCG: 0.03 TABLET ORAL at 06:31

## 2025-01-10 RX ADMIN — FAMOTIDINE 20 MG: 20 TABLET, FILM COATED ORAL at 08:53

## 2025-01-10 ASSESSMENT — COGNITIVE AND FUNCTIONAL STATUS - GENERAL
MOBILITY SCORE: 23
CLIMB 3 TO 5 STEPS WITH RAILING: A LITTLE
DAILY ACTIVITIY SCORE: 24

## 2025-01-10 ASSESSMENT — PAIN - FUNCTIONAL ASSESSMENT: PAIN_FUNCTIONAL_ASSESSMENT: 0-10

## 2025-01-10 ASSESSMENT — PAIN SCALES - GENERAL
PAINLEVEL_OUTOF10: 0 - NO PAIN
PAINLEVEL_OUTOF10: 0 - NO PAIN

## 2025-01-10 NOTE — CARE PLAN
The patient's goals for the shift include      The clinical goals for the shift include pt will remain hemodynamically stable throughout shift    Over the shift, the patient did not make progress toward the following goals. Barriers to progression include discharge orders. Recommendations to address these barriers include medications monitoring and contact doctor.

## 2025-01-10 NOTE — DISCHARGE SUMMARY
Discharge Diagnosis:   Chest pain, unspecified       Admission Date: 1/8/2025   Discharge Date: 01/10/25       Hospital Course:   Lalita Dumont is a pleasant 65 y.o. female            Discharge Physical Exam:    HEENT:  Atraumatic, PERRL. Conjunctivae clear.  Moist nasal mucous membranes.   Neck:  Supple without thyromegaly or lymphadenopathy.  Lungs:  Clear to auscultation without rales, rhonchi, or rub.  Heart:  RRR, S1, S2, without M.  Abdomen:  Soft, non tender, no organ enlargement.  Bowel sounds present . No CVA tenderness.  Extremities:  No edema. No calf swelling or tenderness.    Skin:  No rash, ecchymosis or erythema.          On the day of discharge patient was ambulating well, eating and drinking well. Physical exam was essentially benign and was at baseline. Blood chemistry and other lab testing results were at acceptable for discharge. Patient remained hemodynamically stable and with no further acute concern. Patient verbalized understanding of discharge medications and the expected adverse effects, if any.       The detail of the hospital course  including admission H&P, consult recommendations, biochemical lab results, imaging studies can be found in EHR of [facility]. Total 29 minutes time was spent on discharge exam, medicine reconciliation, discharge instructions and preparing discharge summary.     Home Medications After Discharge    Scheduled   amLODIPine (Norvasc) 5 mg tablet, Take 1 tablet (5 mg) by mouth once daily.   aspirin 81 mg EC tablet, Take 1 tablet (81 mg) by mouth once daily.   atorvastatin (Lipitor) 40 mg tablet, Take 1 tablet (40 mg) by mouth once daily.   cholecalciferol (Vitamin D-3) 25 MCG (1000 UT) tablet, Take 2 tablets (2,000 Units) by mouth 2 times a day.   clopidogrel (Plavix) 75 mg tablet, Take 1 tablet (75 mg) by mouth once daily.   cyanocobalamin (Vitamin B-12) 1,000 mcg tablet, Take 1 tablet (1,000 mcg) by mouth once daily.   glipiZIDE XL (Glucotrol XL)  5 mg 24 hr tablet, Take 1 tablet (5 mg) by mouth once daily. Do not crush, chew, or split.   hydroCHLOROthiazide (Microzide) 12.5 mg tablet, Take 1 tablet (12.5 mg) by mouth once daily.   isosorbide mononitrate ER (Imdur) 60 mg 24 hr tablet, Take 1 tablet (60 mg) by mouth once daily. Do not crush or chew.   levothyroxine (Synthroid, Levoxyl) 25 mcg tablet, Take 1 tablet (25 mcg) by mouth once daily.   Refresh Optive Advanced 0.5-1-0.5 % drops, Administer 2 drops into both eyes once daily.   sertraline (Zoloft) 50 mg tablet, Take 1 tablet (50 mg) by mouth once daily.   spironolactone (Aldactone) 25 mg tablet, Take 1 tablet (25 mg) by mouth once daily.    PRN   acetaminophen (TylenoL) 325 mg tablet, Take 2 tablets (650 mg) by mouth every 4 hours if needed.   albuterol 90 mcg/actuation inhaler, Inhale 2 puffs every 6 hours if needed.   ipratropium-albuteroL (Duo-Neb) 0.5-2.5 mg/3 mL nebulizer solution, Take 3 mL by nebulization every 4 hours if needed for wheezing.   nitroglycerin (Nitrostat) 0.4 mg SL tablet, Place 1 tablet (0.4 mg) under the tongue every 5 minutes if needed for chest pain.    No Frequency   clotrimazole-betamethasone (Lotrisone) cream, Apply sparingly to corners of mouth twice a day for up to 2 weeks.  Avoid ingestion.         Outpatient Follow up:   Future Appointments   Date Time Provider Department Center   1/22/2025 10:30 AM Victoria Shelton MD [id number] [facility]   1/23/2025 10:45 AM Tae Blanchard MD [id number] [facility]     PCP: Victoria Shelton MD   The transitional care management team of [facility] will contact patient.    Patient was also advised to call PCP's office for hospital discharge follow-up in 7-10 days from today.          PS: This note was completed using Dragon voice recognition technology and may include unintended errors with respect to translation of words, typographical or grammar errors which may not have been identified while finalizing the chart.

## 2025-01-11 ENCOUNTER — APPOINTMENT (OUTPATIENT)
Dept: RADIOLOGY | Facility: HOSPITAL | Age: 65
End: 2025-01-11
Payer: COMMERCIAL

## 2025-01-11 ENCOUNTER — PATIENT MESSAGE (OUTPATIENT)
Dept: PRIMARY CARE | Facility: CLINIC | Age: 65
End: 2025-01-11
Payer: COMMERCIAL

## 2025-01-11 ENCOUNTER — APPOINTMENT (OUTPATIENT)
Dept: CARDIOLOGY | Facility: HOSPITAL | Age: 65
End: 2025-01-11
Payer: COMMERCIAL

## 2025-01-11 ENCOUNTER — HOSPITAL ENCOUNTER (OUTPATIENT)
Facility: HOSPITAL | Age: 65
Setting detail: OBSERVATION
End: 2025-01-11
Attending: STUDENT IN AN ORGANIZED HEALTH CARE EDUCATION/TRAINING PROGRAM | Admitting: INTERNAL MEDICINE
Payer: COMMERCIAL

## 2025-01-11 DIAGNOSIS — R07.9 ACUTE CHEST PAIN: Primary | ICD-10-CM

## 2025-01-11 LAB
ALBUMIN SERPL BCP-MCNC: 4.2 G/DL (ref 3.4–5)
ALP SERPL-CCNC: 58 U/L (ref 33–136)
ALT SERPL W P-5'-P-CCNC: 17 U/L (ref 7–45)
AMORPH CRY #/AREA UR COMP ASSIST: NORMAL /HPF
ANION GAP SERPL CALC-SCNC: 13 MMOL/L (ref 10–20)
APPEARANCE UR: CLEAR
AST SERPL W P-5'-P-CCNC: 19 U/L (ref 9–39)
ATRIAL RATE: 68 BPM
BASOPHILS # BLD AUTO: 0.02 X10*3/UL (ref 0–0.1)
BASOPHILS NFR BLD AUTO: 0.4 %
BILIRUB SERPL-MCNC: 0.9 MG/DL (ref 0–1.2)
BILIRUB UR STRIP.AUTO-MCNC: NEGATIVE MG/DL
BUN SERPL-MCNC: 18 MG/DL (ref 6–23)
CALCIUM SERPL-MCNC: 9.8 MG/DL (ref 8.6–10.3)
CARDIAC TROPONIN I PNL SERPL HS: 17 NG/L (ref 0–13)
CARDIAC TROPONIN I PNL SERPL HS: 19 NG/L (ref 0–13)
CHLORIDE SERPL-SCNC: 102 MMOL/L (ref 98–107)
CO2 SERPL-SCNC: 27 MMOL/L (ref 21–32)
COLOR UR: YELLOW
CREAT SERPL-MCNC: 1 MG/DL (ref 0.5–1.05)
D DIMER PPP FEU-MCNC: <215 NG/ML FEU
EGFRCR SERPLBLD CKD-EPI 2021: 62 ML/MIN/1.73M*2
EOSINOPHIL # BLD AUTO: 0.07 X10*3/UL (ref 0–0.7)
EOSINOPHIL NFR BLD AUTO: 1.2 %
ERYTHROCYTE [DISTWIDTH] IN BLOOD BY AUTOMATED COUNT: 12.5 % (ref 11.5–14.5)
FLUAV RNA RESP QL NAA+PROBE: NOT DETECTED
FLUBV RNA RESP QL NAA+PROBE: NOT DETECTED
GLUCOSE SERPL-MCNC: 141 MG/DL (ref 74–99)
GLUCOSE UR STRIP.AUTO-MCNC: NORMAL MG/DL
HCT VFR BLD AUTO: 39.2 % (ref 36–46)
HGB BLD-MCNC: 13.3 G/DL (ref 12–16)
IMM GRANULOCYTES # BLD AUTO: 0.02 X10*3/UL (ref 0–0.7)
IMM GRANULOCYTES NFR BLD AUTO: 0.4 % (ref 0–0.9)
KETONES UR STRIP.AUTO-MCNC: NEGATIVE MG/DL
LEUKOCYTE ESTERASE UR QL STRIP.AUTO: ABNORMAL
LYMPHOCYTES # BLD AUTO: 1.11 X10*3/UL (ref 1.2–4.8)
LYMPHOCYTES NFR BLD AUTO: 19.8 %
MAGNESIUM SERPL-MCNC: 2.14 MG/DL (ref 1.6–2.4)
MCH RBC QN AUTO: 30 PG (ref 26–34)
MCHC RBC AUTO-ENTMCNC: 33.9 G/DL (ref 32–36)
MCV RBC AUTO: 88 FL (ref 80–100)
MONOCYTES # BLD AUTO: 0.63 X10*3/UL (ref 0.1–1)
MONOCYTES NFR BLD AUTO: 11.2 %
NEUTROPHILS # BLD AUTO: 3.76 X10*3/UL (ref 1.2–7.7)
NEUTROPHILS NFR BLD AUTO: 67 %
NITRITE UR QL STRIP.AUTO: NEGATIVE
NRBC BLD-RTO: 0 /100 WBCS (ref 0–0)
P AXIS: 57 DEGREES
P OFFSET: 149 MS
P ONSET: 107 MS
PH UR STRIP.AUTO: 7 [PH]
PLATELET # BLD AUTO: 187 X10*3/UL (ref 150–450)
POTASSIUM SERPL-SCNC: 3.8 MMOL/L (ref 3.5–5.3)
PR INTERVAL: 188 MS
PROT SERPL-MCNC: 7.4 G/DL (ref 6.4–8.2)
PROT UR STRIP.AUTO-MCNC: NEGATIVE MG/DL
Q ONSET: 201 MS
QRS COUNT: 11 BEATS
QRS DURATION: 142 MS
QT INTERVAL: 432 MS
QTC CALCULATION(BAZETT): 459 MS
QTC FREDERICIA: 450 MS
R AXIS: 27 DEGREES
RBC # BLD AUTO: 4.44 X10*6/UL (ref 4–5.2)
RBC # UR STRIP.AUTO: NEGATIVE /UL
RBC #/AREA URNS AUTO: NORMAL /HPF
SARS-COV-2 RNA RESP QL NAA+PROBE: NOT DETECTED
SODIUM SERPL-SCNC: 138 MMOL/L (ref 136–145)
SP GR UR STRIP.AUTO: 1.02
T AXIS: 107 DEGREES
T OFFSET: 417 MS
UROBILINOGEN UR STRIP.AUTO-MCNC: ABNORMAL MG/DL
VENTRICULAR RATE: 68 BPM
WBC # BLD AUTO: 5.6 X10*3/UL (ref 4.4–11.3)
WBC #/AREA URNS AUTO: NORMAL /HPF

## 2025-01-11 PROCEDURE — 2500000004 HC RX 250 GENERAL PHARMACY W/ HCPCS (ALT 636 FOR OP/ED): Performed by: PHYSICIAN ASSISTANT

## 2025-01-11 PROCEDURE — 96374 THER/PROPH/DIAG INJ IV PUSH: CPT | Mod: 59

## 2025-01-11 PROCEDURE — 85025 COMPLETE CBC W/AUTO DIFF WBC: CPT | Performed by: PHYSICIAN ASSISTANT

## 2025-01-11 PROCEDURE — 93005 ELECTROCARDIOGRAM TRACING: CPT

## 2025-01-11 PROCEDURE — 84484 ASSAY OF TROPONIN QUANT: CPT | Performed by: PHYSICIAN ASSISTANT

## 2025-01-11 PROCEDURE — 99285 EMERGENCY DEPT VISIT HI MDM: CPT | Performed by: STUDENT IN AN ORGANIZED HEALTH CARE EDUCATION/TRAINING PROGRAM

## 2025-01-11 PROCEDURE — G0378 HOSPITAL OBSERVATION PER HR: HCPCS

## 2025-01-11 PROCEDURE — 96375 TX/PRO/DX INJ NEW DRUG ADDON: CPT | Mod: 59

## 2025-01-11 PROCEDURE — 81001 URINALYSIS AUTO W/SCOPE: CPT | Performed by: PHYSICIAN ASSISTANT

## 2025-01-11 PROCEDURE — 36415 COLL VENOUS BLD VENIPUNCTURE: CPT | Performed by: PHYSICIAN ASSISTANT

## 2025-01-11 PROCEDURE — 71045 X-RAY EXAM CHEST 1 VIEW: CPT | Mod: FOREIGN READ | Performed by: RADIOLOGY

## 2025-01-11 PROCEDURE — 84075 ASSAY ALKALINE PHOSPHATASE: CPT | Performed by: PHYSICIAN ASSISTANT

## 2025-01-11 PROCEDURE — 71045 X-RAY EXAM CHEST 1 VIEW: CPT

## 2025-01-11 PROCEDURE — 85379 FIBRIN DEGRADATION QUANT: CPT | Performed by: PHYSICIAN ASSISTANT

## 2025-01-11 PROCEDURE — 87086 URINE CULTURE/COLONY COUNT: CPT | Mod: ELYLAB | Performed by: PHYSICIAN ASSISTANT

## 2025-01-11 PROCEDURE — 83735 ASSAY OF MAGNESIUM: CPT | Performed by: PHYSICIAN ASSISTANT

## 2025-01-11 PROCEDURE — 87636 SARSCOV2 & INF A&B AMP PRB: CPT | Performed by: PHYSICIAN ASSISTANT

## 2025-01-11 RX ORDER — FENTANYL CITRATE 50 UG/ML
50 INJECTION, SOLUTION INTRAMUSCULAR; INTRAVENOUS ONCE
Status: COMPLETED | OUTPATIENT
Start: 2025-01-11 | End: 2025-01-11

## 2025-01-11 RX ORDER — ONDANSETRON HYDROCHLORIDE 2 MG/ML
4 INJECTION, SOLUTION INTRAVENOUS ONCE
Status: COMPLETED | OUTPATIENT
Start: 2025-01-11 | End: 2025-01-11

## 2025-01-11 RX ADMIN — FENTANYL CITRATE 50 MCG: 50 INJECTION INTRAMUSCULAR; INTRAVENOUS at 19:31

## 2025-01-11 RX ADMIN — ONDANSETRON 4 MG: 2 INJECTION INTRAMUSCULAR; INTRAVENOUS at 19:31

## 2025-01-11 ASSESSMENT — HEART SCORE
RISK FACTORS: >2 RISK FACTORS OR HX OF ATHEROSCLEROTIC DISEASE
HEART SCORE: 6
HISTORY: MODERATELY SUSPICIOUS
AGE: 65+
TROPONIN: LESS THAN OR EQUAL TO NORMAL LIMIT
ECG: NON-SPECIFIC REPOLARIZATION DISTURBANCE

## 2025-01-11 ASSESSMENT — LIFESTYLE VARIABLES
TOTAL SCORE: 0
HAVE PEOPLE ANNOYED YOU BY CRITICIZING YOUR DRINKING: NO
HAVE YOU EVER FELT YOU SHOULD CUT DOWN ON YOUR DRINKING: NO
EVER HAD A DRINK FIRST THING IN THE MORNING TO STEADY YOUR NERVES TO GET RID OF A HANGOVER: NO
EVER FELT BAD OR GUILTY ABOUT YOUR DRINKING: NO

## 2025-01-11 ASSESSMENT — PAIN SCALES - GENERAL
PAINLEVEL_OUTOF10: 5 - MODERATE PAIN

## 2025-01-11 ASSESSMENT — PAIN - FUNCTIONAL ASSESSMENT: PAIN_FUNCTIONAL_ASSESSMENT: 0-10

## 2025-01-11 ASSESSMENT — PAIN DESCRIPTION - PAIN TYPE: TYPE: ACUTE PAIN

## 2025-01-11 ASSESSMENT — PAIN DESCRIPTION - LOCATION: LOCATION: CHEST

## 2025-01-12 VITALS
OXYGEN SATURATION: 94 % | SYSTOLIC BLOOD PRESSURE: 113 MMHG | WEIGHT: 203 LBS | TEMPERATURE: 97.2 F | DIASTOLIC BLOOD PRESSURE: 65 MMHG | HEART RATE: 62 BPM | RESPIRATION RATE: 16 BRPM | HEIGHT: 63 IN | BODY MASS INDEX: 35.97 KG/M2

## 2025-01-12 LAB
ATRIAL RATE: 55 BPM
ATRIAL RATE: 55 BPM
ATRIAL RATE: 58 BPM
ATRIAL RATE: 59 BPM
HOLD SPECIMEN: NORMAL
P AXIS: 49 DEGREES
P AXIS: 52 DEGREES
P AXIS: 65 DEGREES
P AXIS: 76 DEGREES
P OFFSET: 143 MS
P OFFSET: 151 MS
P OFFSET: 153 MS
P OFFSET: 162 MS
P ONSET: 100 MS
P ONSET: 103 MS
P ONSET: 105 MS
P ONSET: 106 MS
PR INTERVAL: 194 MS
PR INTERVAL: 196 MS
PR INTERVAL: 204 MS
PR INTERVAL: 212 MS
Q ONSET: 203 MS
Q ONSET: 203 MS
Q ONSET: 205 MS
Q ONSET: 206 MS
QRS COUNT: 10 BEATS
QRS COUNT: 10 BEATS
QRS COUNT: 9 BEATS
QRS COUNT: 9 BEATS
QRS DURATION: 156 MS
QRS DURATION: 168 MS
QRS DURATION: 168 MS
QRS DURATION: 172 MS
QT INTERVAL: 484 MS
QT INTERVAL: 504 MS
QT INTERVAL: 508 MS
QT INTERVAL: 508 MS
QTC CALCULATION(BAZETT): 475 MS
QTC CALCULATION(BAZETT): 485 MS
QTC CALCULATION(BAZETT): 485 MS
QTC CALCULATION(BAZETT): 498 MS
QTC FREDERICIA: 478 MS
QTC FREDERICIA: 493 MS
QTC FREDERICIA: 493 MS
QTC FREDERICIA: 501 MS
R AXIS: -14 DEGREES
R AXIS: -16 DEGREES
R AXIS: -7 DEGREES
R AXIS: -7 DEGREES
T AXIS: 101 DEGREES
T AXIS: 69 DEGREES
T AXIS: 71 DEGREES
T AXIS: 82 DEGREES
T OFFSET: 445 MS
T OFFSET: 455 MS
T OFFSET: 459 MS
T OFFSET: 460 MS
VENTRICULAR RATE: 55 BPM
VENTRICULAR RATE: 55 BPM
VENTRICULAR RATE: 58 BPM
VENTRICULAR RATE: 59 BPM

## 2025-01-12 PROCEDURE — 96372 THER/PROPH/DIAG INJ SC/IM: CPT | Performed by: INTERNAL MEDICINE

## 2025-01-12 PROCEDURE — 2500000001 HC RX 250 WO HCPCS SELF ADMINISTERED DRUGS (ALT 637 FOR MEDICARE OP): Performed by: INTERNAL MEDICINE

## 2025-01-12 PROCEDURE — 99223 1ST HOSP IP/OBS HIGH 75: CPT | Performed by: FAMILY MEDICINE

## 2025-01-12 PROCEDURE — 2500000004 HC RX 250 GENERAL PHARMACY W/ HCPCS (ALT 636 FOR OP/ED): Performed by: INTERNAL MEDICINE

## 2025-01-12 PROCEDURE — G0378 HOSPITAL OBSERVATION PER HR: HCPCS

## 2025-01-12 PROCEDURE — 2500000002 HC RX 250 W HCPCS SELF ADMINISTERED DRUGS (ALT 637 FOR MEDICARE OP, ALT 636 FOR OP/ED): Performed by: INTERNAL MEDICINE

## 2025-01-12 RX ORDER — LEVOTHYROXINE SODIUM 25 UG/1
25 TABLET ORAL DAILY
Status: DISPENSED | OUTPATIENT
Start: 2025-01-12

## 2025-01-12 RX ORDER — ATORVASTATIN CALCIUM 20 MG/1
40 TABLET, FILM COATED ORAL NIGHTLY
Status: DISPENSED | OUTPATIENT
Start: 2025-01-12

## 2025-01-12 RX ORDER — ACETAMINOPHEN 160 MG/5ML
650 SOLUTION ORAL EVERY 4 HOURS PRN
Status: ACTIVE | OUTPATIENT
Start: 2025-01-12

## 2025-01-12 RX ORDER — CHOLECALCIFEROL (VITAMIN D3) 25 MCG
2000 TABLET ORAL 2 TIMES DAILY
Status: DISPENSED | OUTPATIENT
Start: 2025-01-12

## 2025-01-12 RX ORDER — AMLODIPINE BESYLATE 5 MG/1
5 TABLET ORAL DAILY
Status: DISPENSED | OUTPATIENT
Start: 2025-01-12

## 2025-01-12 RX ORDER — IPRATROPIUM BROMIDE AND ALBUTEROL SULFATE 2.5; .5 MG/3ML; MG/3ML
3 SOLUTION RESPIRATORY (INHALATION) EVERY 4 HOURS PRN
Status: ACTIVE | OUTPATIENT
Start: 2025-01-12

## 2025-01-12 RX ORDER — CLOTRIMAZOLE AND BETAMETHASONE DIPROPIONATE 10; .64 MG/G; MG/G
CREAM TOPICAL 2 TIMES DAILY
Status: DISPENSED | OUTPATIENT
Start: 2025-01-12

## 2025-01-12 RX ORDER — ACETAMINOPHEN 650 MG/1
650 SUPPOSITORY RECTAL EVERY 4 HOURS PRN
Status: ACTIVE | OUTPATIENT
Start: 2025-01-12

## 2025-01-12 RX ORDER — SPIRONOLACTONE 25 MG/1
25 TABLET ORAL DAILY
Status: DISPENSED | OUTPATIENT
Start: 2025-01-12

## 2025-01-12 RX ORDER — NITROGLYCERIN 0.4 MG/1
0.4 TABLET SUBLINGUAL EVERY 5 MIN PRN
Status: ACTIVE | OUTPATIENT
Start: 2025-01-12

## 2025-01-12 RX ORDER — FAMOTIDINE 20 MG/1
20 TABLET, FILM COATED ORAL DAILY
Status: DISCONTINUED | OUTPATIENT
Start: 2025-01-12 | End: 2025-01-12 | Stop reason: SDUPTHER

## 2025-01-12 RX ORDER — SERTRALINE HYDROCHLORIDE 50 MG/1
50 TABLET, FILM COATED ORAL DAILY
Status: DISPENSED | OUTPATIENT
Start: 2025-01-12

## 2025-01-12 RX ORDER — ISOSORBIDE MONONITRATE 60 MG/1
60 TABLET, EXTENDED RELEASE ORAL DAILY
Status: DISPENSED | OUTPATIENT
Start: 2025-01-12

## 2025-01-12 RX ORDER — PANTOPRAZOLE SODIUM 40 MG/10ML
40 INJECTION, POWDER, LYOPHILIZED, FOR SOLUTION INTRAVENOUS
Status: DISCONTINUED | OUTPATIENT
Start: 2025-01-12 | End: 2025-01-12

## 2025-01-12 RX ORDER — PANTOPRAZOLE SODIUM 40 MG/1
40 TABLET, DELAYED RELEASE ORAL
Status: DISCONTINUED | OUTPATIENT
Start: 2025-01-12 | End: 2025-01-12

## 2025-01-12 RX ORDER — ACETAMINOPHEN 325 MG/1
650 TABLET ORAL EVERY 4 HOURS PRN
Status: DISPENSED | OUTPATIENT
Start: 2025-01-12

## 2025-01-12 RX ORDER — ALBUTEROL SULFATE 90 UG/1
2 INHALANT RESPIRATORY (INHALATION) EVERY 6 HOURS PRN
Status: DISPENSED | OUTPATIENT
Start: 2025-01-12

## 2025-01-12 RX ORDER — VIT C/E/ZN/COPPR/LUTEIN/ZEAXAN 250MG-90MG
1000 CAPSULE ORAL DAILY
Status: DISPENSED | OUTPATIENT
Start: 2025-01-12

## 2025-01-12 RX ORDER — CLOPIDOGREL BISULFATE 75 MG/1
75 TABLET ORAL DAILY
Status: DISPENSED | OUTPATIENT
Start: 2025-01-12

## 2025-01-12 RX ORDER — ONDANSETRON HYDROCHLORIDE 2 MG/ML
4 INJECTION, SOLUTION INTRAVENOUS EVERY 8 HOURS PRN
Status: ACTIVE | OUTPATIENT
Start: 2025-01-12

## 2025-01-12 RX ORDER — MORPHINE SULFATE 4 MG/ML
4 INJECTION, SOLUTION INTRAMUSCULAR; INTRAVENOUS EVERY 6 HOURS PRN
Status: ACTIVE | OUTPATIENT
Start: 2025-01-12

## 2025-01-12 RX ORDER — HEPARIN SODIUM 5000 [USP'U]/ML
5000 INJECTION, SOLUTION INTRAVENOUS; SUBCUTANEOUS EVERY 8 HOURS
Status: DISPENSED | OUTPATIENT
Start: 2025-01-12

## 2025-01-12 RX ORDER — GUAIFENESIN/DEXTROMETHORPHAN 100-10MG/5
5 SYRUP ORAL EVERY 4 HOURS PRN
Status: ACTIVE | OUTPATIENT
Start: 2025-01-12

## 2025-01-12 RX ORDER — AMOXICILLIN 250 MG
2 CAPSULE ORAL 2 TIMES DAILY PRN
Status: ACTIVE | OUTPATIENT
Start: 2025-01-12

## 2025-01-12 RX ORDER — GLIPIZIDE 5 MG/1
5 TABLET, FILM COATED, EXTENDED RELEASE ORAL DAILY
Status: DISPENSED | OUTPATIENT
Start: 2025-01-12

## 2025-01-12 RX ORDER — ONDANSETRON 4 MG/1
4 TABLET, FILM COATED ORAL EVERY 8 HOURS PRN
Status: ACTIVE | OUTPATIENT
Start: 2025-01-12

## 2025-01-12 RX ORDER — FAMOTIDINE 20 MG/1
20 TABLET, FILM COATED ORAL 2 TIMES DAILY
Status: DISPENSED | OUTPATIENT
Start: 2025-01-12

## 2025-01-12 RX ORDER — FAMOTIDINE 10 MG/ML
20 INJECTION INTRAVENOUS 2 TIMES DAILY
Status: ACTIVE | OUTPATIENT
Start: 2025-01-12

## 2025-01-12 RX ORDER — ASPIRIN 81 MG/1
81 TABLET ORAL DAILY
Status: DISPENSED | OUTPATIENT
Start: 2025-01-12

## 2025-01-12 RX ORDER — HYDROCHLOROTHIAZIDE 12.5 MG/1
12.5 TABLET ORAL DAILY
Status: DISPENSED | OUTPATIENT
Start: 2025-01-12

## 2025-01-12 RX ORDER — FAMOTIDINE 10 MG/ML
20 INJECTION INTRAVENOUS DAILY
Status: DISCONTINUED | OUTPATIENT
Start: 2025-01-12 | End: 2025-01-12 | Stop reason: SDUPTHER

## 2025-01-12 RX ADMIN — SERTRALINE HYDROCHLORIDE 50 MG: 50 TABLET, FILM COATED ORAL at 09:44

## 2025-01-12 RX ADMIN — ACETAMINOPHEN 650 MG: 325 TABLET ORAL at 02:05

## 2025-01-12 RX ADMIN — Medication 2000 UNITS: at 20:24

## 2025-01-12 RX ADMIN — ASPIRIN 81 MG: 81 TABLET, COATED ORAL at 09:44

## 2025-01-12 RX ADMIN — SPIRONOLACTONE 25 MG: 25 TABLET ORAL at 09:44

## 2025-01-12 RX ADMIN — HEPARIN SODIUM 5000 UNITS: 5000 INJECTION INTRAVENOUS; SUBCUTANEOUS at 17:56

## 2025-01-12 RX ADMIN — HYDROCHLOROTHIAZIDE 12.5 MG: 12.5 TABLET ORAL at 09:44

## 2025-01-12 RX ADMIN — LEVOTHYROXINE SODIUM 25 MCG: 25 TABLET ORAL at 04:50

## 2025-01-12 RX ADMIN — AMLODIPINE BESYLATE 5 MG: 5 TABLET ORAL at 17:56

## 2025-01-12 RX ADMIN — CLOPIDOGREL BISULFATE 75 MG: 75 TABLET, FILM COATED ORAL at 09:44

## 2025-01-12 RX ADMIN — GLIPIZIDE 5 MG: 5 TABLET, EXTENDED RELEASE ORAL at 09:44

## 2025-01-12 RX ADMIN — ACETAMINOPHEN 650 MG: 325 TABLET ORAL at 09:44

## 2025-01-12 RX ADMIN — ATORVASTATIN CALCIUM 40 MG: 20 TABLET, FILM COATED ORAL at 20:24

## 2025-01-12 RX ADMIN — HEPARIN SODIUM 5000 UNITS: 5000 INJECTION INTRAVENOUS; SUBCUTANEOUS at 02:05

## 2025-01-12 RX ADMIN — HEPARIN SODIUM 5000 UNITS: 5000 INJECTION INTRAVENOUS; SUBCUTANEOUS at 09:47

## 2025-01-12 RX ADMIN — ISOSORBIDE MONONITRATE 60 MG: 60 TABLET, EXTENDED RELEASE ORAL at 11:11

## 2025-01-12 RX ADMIN — Medication 2000 UNITS: at 09:44

## 2025-01-12 RX ADMIN — Medication 1000 MCG: at 09:43

## 2025-01-12 RX ADMIN — CLOTRIMAZOLE AND BETAMETHASONE DIPROPIONATE: 10; .64 CREAM TOPICAL at 14:59

## 2025-01-12 RX ADMIN — FAMOTIDINE 20 MG: 20 TABLET, FILM COATED ORAL at 09:44

## 2025-01-12 RX ADMIN — FAMOTIDINE 20 MG: 20 TABLET, FILM COATED ORAL at 20:24

## 2025-01-12 SDOH — SOCIAL STABILITY: SOCIAL INSECURITY: HAVE YOU HAD ANY THOUGHTS OF HARMING ANYONE ELSE?: NO

## 2025-01-12 SDOH — SOCIAL STABILITY: SOCIAL INSECURITY: DO YOU FEEL ANYONE HAS EXPLOITED OR TAKEN ADVANTAGE OF YOU FINANCIALLY OR OF YOUR PERSONAL PROPERTY?: NO

## 2025-01-12 SDOH — ECONOMIC STABILITY: FOOD INSECURITY: HOW HARD IS IT FOR YOU TO PAY FOR THE VERY BASICS LIKE FOOD, HOUSING, MEDICAL CARE, AND HEATING?: PATIENT DECLINED

## 2025-01-12 SDOH — HEALTH STABILITY: PHYSICAL HEALTH
HOW OFTEN DO YOU NEED TO HAVE SOMEONE HELP YOU WHEN YOU READ INSTRUCTIONS, PAMPHLETS, OR OTHER WRITTEN MATERIAL FROM YOUR DOCTOR OR PHARMACY?: NEVER

## 2025-01-12 SDOH — SOCIAL STABILITY: SOCIAL INSECURITY: ARE YOU OR HAVE YOU BEEN THREATENED OR ABUSED PHYSICALLY, EMOTIONALLY, OR SEXUALLY BY ANYONE?: NO

## 2025-01-12 SDOH — ECONOMIC STABILITY: FOOD INSECURITY
WITHIN THE PAST 12 MONTHS, YOU WORRIED THAT YOUR FOOD WOULD RUN OUT BEFORE YOU GOT THE MONEY TO BUY MORE.: PATIENT DECLINED

## 2025-01-12 SDOH — SOCIAL STABILITY: SOCIAL INSECURITY: HAS ANYONE EVER THREATENED TO HURT YOUR FAMILY OR YOUR PETS?: NO

## 2025-01-12 SDOH — SOCIAL STABILITY: SOCIAL INSECURITY: HAVE YOU HAD THOUGHTS OF HARMING ANYONE ELSE?: NO

## 2025-01-12 SDOH — ECONOMIC STABILITY: FOOD INSECURITY: WITHIN THE PAST 12 MONTHS, THE FOOD YOU BOUGHT JUST DIDN'T LAST AND YOU DIDN'T HAVE MONEY TO GET MORE.: PATIENT DECLINED

## 2025-01-12 SDOH — SOCIAL STABILITY: SOCIAL INSECURITY: ABUSE: ADULT

## 2025-01-12 SDOH — SOCIAL STABILITY: SOCIAL NETWORK: IN A TYPICAL WEEK, HOW MANY TIMES DO YOU TALK ON THE PHONE WITH FAMILY, FRIENDS, OR NEIGHBORS?: PATIENT DECLINED

## 2025-01-12 SDOH — ECONOMIC STABILITY: INCOME INSECURITY
IN THE PAST 12 MONTHS HAS THE ELECTRIC, GAS, OIL, OR WATER COMPANY THREATENED TO SHUT OFF SERVICES IN YOUR HOME?: PATIENT DECLINED

## 2025-01-12 SDOH — ECONOMIC STABILITY: TRANSPORTATION INSECURITY
IN THE PAST 12 MONTHS, HAS LACK OF TRANSPORTATION KEPT YOU FROM MEDICAL APPOINTMENTS OR FROM GETTING MEDICATIONS?: PATIENT DECLINED

## 2025-01-12 SDOH — ECONOMIC STABILITY: HOUSING INSECURITY: IN THE LAST 12 MONTHS, WAS THERE A TIME WHEN YOU WERE NOT ABLE TO PAY THE MORTGAGE OR RENT ON TIME?: PATIENT DECLINED

## 2025-01-12 SDOH — ECONOMIC STABILITY: HOUSING INSECURITY: AT ANY TIME IN THE PAST 12 MONTHS, WERE YOU HOMELESS OR LIVING IN A SHELTER (INCLUDING NOW)?: PATIENT DECLINED

## 2025-01-12 SDOH — SOCIAL STABILITY: SOCIAL INSECURITY: WERE YOU ABLE TO COMPLETE ALL THE BEHAVIORAL HEALTH SCREENINGS?: YES

## 2025-01-12 SDOH — SOCIAL STABILITY: SOCIAL INSECURITY
WITHIN THE LAST YEAR, HAVE YOU BEEN HUMILIATED OR EMOTIONALLY ABUSED IN OTHER WAYS BY YOUR PARTNER OR EX-PARTNER?: PATIENT DECLINED

## 2025-01-12 SDOH — SOCIAL STABILITY: SOCIAL INSECURITY: DOES ANYONE TRY TO KEEP YOU FROM HAVING/CONTACTING OTHER FRIENDS OR DOING THINGS OUTSIDE YOUR HOME?: NO

## 2025-01-12 SDOH — SOCIAL STABILITY: SOCIAL INSECURITY
WITHIN THE LAST YEAR, HAVE YOU BEEN RAPED OR FORCED TO HAVE ANY KIND OF SEXUAL ACTIVITY BY YOUR PARTNER OR EX-PARTNER?: PATIENT DECLINED

## 2025-01-12 SDOH — ECONOMIC STABILITY: HOUSING INSECURITY: IN THE PAST 12 MONTHS, HOW MANY TIMES HAVE YOU MOVED WHERE YOU WERE LIVING?: 0

## 2025-01-12 SDOH — SOCIAL STABILITY: SOCIAL INSECURITY
WITHIN THE LAST YEAR, HAVE YOU BEEN KICKED, HIT, SLAPPED, OR OTHERWISE PHYSICALLY HURT BY YOUR PARTNER OR EX-PARTNER?: PATIENT DECLINED

## 2025-01-12 SDOH — SOCIAL STABILITY: SOCIAL INSECURITY: ARE THERE ANY APPARENT SIGNS OF INJURIES/BEHAVIORS THAT COULD BE RELATED TO ABUSE/NEGLECT?: NO

## 2025-01-12 SDOH — SOCIAL STABILITY: SOCIAL INSECURITY: WITHIN THE LAST YEAR, HAVE YOU BEEN AFRAID OF YOUR PARTNER OR EX-PARTNER?: PATIENT DECLINED

## 2025-01-12 SDOH — HEALTH STABILITY: MENTAL HEALTH
DO YOU FEEL STRESS - TENSE, RESTLESS, NERVOUS, OR ANXIOUS, OR UNABLE TO SLEEP AT NIGHT BECAUSE YOUR MIND IS TROUBLED ALL THE TIME - THESE DAYS?: PATIENT DECLINED

## 2025-01-12 SDOH — SOCIAL STABILITY: SOCIAL INSECURITY: DO YOU FEEL UNSAFE GOING BACK TO THE PLACE WHERE YOU ARE LIVING?: NO

## 2025-01-12 ASSESSMENT — LIFESTYLE VARIABLES
SKIP TO QUESTIONS 9-10: 1
HOW OFTEN DO YOU HAVE A DRINK CONTAINING ALCOHOL: NEVER
PRESCIPTION_ABUSE_PAST_12_MONTHS: NO
HOW MANY STANDARD DRINKS CONTAINING ALCOHOL DO YOU HAVE ON A TYPICAL DAY: PATIENT DOES NOT DRINK
AUDIT-C TOTAL SCORE: 0
AUDIT-C TOTAL SCORE: 0
SUBSTANCE_ABUSE_PAST_12_MONTHS: NO
HOW OFTEN DO YOU HAVE 6 OR MORE DRINKS ON ONE OCCASION: NEVER

## 2025-01-12 ASSESSMENT — PAIN SCALES - GENERAL
PAINLEVEL_OUTOF10: 0 - NO PAIN
PAINLEVEL_OUTOF10: 0 - NO PAIN
PAINLEVEL_OUTOF10: 1
PAINLEVEL_OUTOF10: 2
PAINLEVEL_OUTOF10: 0 - NO PAIN
PAINLEVEL_OUTOF10: 0 - NO PAIN

## 2025-01-12 ASSESSMENT — ENCOUNTER SYMPTOMS
NERVOUS/ANXIOUS: 0
FLANK PAIN: 0
JOINT SWELLING: 0
ABDOMINAL PAIN: 0
UNEXPECTED WEIGHT CHANGE: 0
DIARRHEA: 0
NAUSEA: 0
PALPITATIONS: 0
AGITATION: 0
CONSTIPATION: 0
DYSURIA: 0
LIGHT-HEADEDNESS: 0
SORE THROAT: 0
FATIGUE: 0
ARTHRALGIAS: 0
HEADACHES: 0
FEVER: 0
SHORTNESS OF BREATH: 0
MYALGIAS: 0
COUGH: 0
WHEEZING: 0
HEMATURIA: 0

## 2025-01-12 ASSESSMENT — COGNITIVE AND FUNCTIONAL STATUS - GENERAL
MOBILITY SCORE: 24
MOBILITY SCORE: 24
DAILY ACTIVITIY SCORE: 24
MOBILITY SCORE: 24
DAILY ACTIVITIY SCORE: 24
PATIENT BASELINE BEDBOUND: NO
DAILY ACTIVITIY SCORE: 24

## 2025-01-12 ASSESSMENT — ACTIVITIES OF DAILY LIVING (ADL)
GROOMING: INDEPENDENT
WALKS IN HOME: INDEPENDENT
HEARING - RIGHT EAR: FUNCTIONAL
DRESSING YOURSELF: INDEPENDENT
LACK_OF_TRANSPORTATION: PATIENT DECLINED
BATHING: INDEPENDENT
TOILETING: INDEPENDENT
JUDGMENT_ADEQUATE_SAFELY_COMPLETE_DAILY_ACTIVITIES: YES
LACK_OF_TRANSPORTATION: PATIENT DECLINED
HEARING - LEFT EAR: FUNCTIONAL
PATIENT'S MEMORY ADEQUATE TO SAFELY COMPLETE DAILY ACTIVITIES?: YES
ADEQUATE_TO_COMPLETE_ADL: YES
FEEDING YOURSELF: INDEPENDENT

## 2025-01-12 ASSESSMENT — PAIN - FUNCTIONAL ASSESSMENT
PAIN_FUNCTIONAL_ASSESSMENT: 0-10

## 2025-01-12 ASSESSMENT — PAIN DESCRIPTION - LOCATION: LOCATION: HEAD

## 2025-01-12 NOTE — CARE PLAN
The patient's goals for the shift include decreased pain    The clinical goals for the shift include pain control    Over the shift, the patient did not make progress toward the following goals. Barriers to progression include patient still having different pain issues with now abdominal pain. Recommendations to address these barriers include notifying the physician to see what can be done.

## 2025-01-12 NOTE — ED PROVIDER NOTES
"HPI   Chief Complaint   Patient presents with    Chest Pain       The patient is a pleasant 65-year-old female who presents to emergency room with chief complaint of a sudden onset of fever, chills, body aches, chest tightness, cough with a headache.  The symptoms began a few hours ago at home.  She did take acetaminophen for the pain with no relief.  The chest discomfort \"comes and goes\" she describes it as sharp worse with deep inspiration and coughing.  She denies any hemoptysis.  Is mostly localized in the right side of her chest.  She denies any history of DVT or PE, heart palpitations, diaphoresis, dizziness or near syncope.  Her headache, fever chills and bodyaches are constant, nothing makes the symptoms worse or better.  She denies any recent ill contacts.  The patient and her  are the primary historians.  The patient recently underwent a cardiac catheterization on January 9, 2025 performed by Dr. Blanchard where she underwent stenting of the circumflex coronary artery, distal Cx lesion which was stented, the report also reads left main coronary artery is normal, left anterior descending artery presents luminal irregularities and contains patent previously placed stents, circumflex coronary artery significant obstructive, distal lesion of the circumflex stenosis 75% this was stented, the right coronary artery presents luminal irregularities and contains patent previously placed stents, mid RCA lesion with 40% stenosis in the left ventricular ejection fraction 55%.  She has a past medical history of migraines, CAD status post stents, ABHISHEK positive, asthma, atypical neuralgia, chronic low back pain, previous CVA, depression, dizziness, fatigue, GERD, hiatal hernia, hypertension, hypothyroidism, mixed hyperlipidemia, nocturia, osteoarthritis of the lumbar spine, rheumatoid arthritis, tremors, type 2 diabetes, fibromyalgia, sinusitis, bronchitis, former smoker, angina pectoris.      History provided by:  " Patient, spouse and medical records          Patient History   Past Medical History:   Diagnosis Date    Allergic     Arthritis     Asthma     CHF (congestive heart failure)     Coronary artery disease     Diabetes mellitus (Multi)     Disease of thyroid gland     Headache     Heart disease     Hypertension     Personal history of other diseases of the circulatory system     History of hypertension    Personal history of other diseases of the musculoskeletal system and connective tissue     History of rheumatoid arthritis    Personal history of other diseases of the respiratory system     History of asthma    Sleep apnea     Stroke (Multi)     Vitamin D deficiency, unspecified     Vitamin D deficiency     Past Surgical History:   Procedure Laterality Date    CARDIAC CATHETERIZATION      CARDIAC CATHETERIZATION N/A 1/9/2025    Procedure: Left Heart Cath;  Surgeon: Tae Blanchard MD;  Location: [facility];  Service: Cardiovascular;  Laterality: N/A;    CARDIAC CATHETERIZATION N/A 1/9/2025    Procedure: PCI RASHARD Stent- Coronary;  Surgeon: Tae Blanchard MD;  Location: [facility];  Service: Cardiovascular;  Laterality: N/A;    CARPAL TUNNEL RELEASE  09/24/2015    Neuroplasty Decompression Median Nerve At Carpal Tunnel    CORONARY STENT PLACEMENT      MR HEAD ANGIO WO IV CONTRAST  03/05/2020    MR HEAD ANGIO WO IV CONTRAST 3/5/2020 [facility]ICAL LEGAC[facility]    MR HEAD ANGIO WO IV CONTRAST  10/10/2023    MR HEAD ANGIO WO IV CONTRAST 10/10/2023 [facility]    MR NECK ANGIO WO IV CONTRAST  03/05/2020    MR NECK ANGIO WO IV CONTRAST 3/5/2020 [facility] CLINICAL LEGAC[facility]    OTHER SURGICAL HISTORY  10/23/2021    Dilation and curettage    OTHER SURGICAL HISTORY  10/23/2021    Colonoscopy    OTHER SURGICAL HISTORY  10/23/2021    Esophagogastroduodenoscopy    OTHER SURGICAL HISTORY  10/23/2021    Cardiac catheterization with stent placement    TUBAL LIGATION  09/24/2015    Tubal Ligation     Family History   Problem Relation  Name Age of Onset    Cancer Mother Stephanie     Other (acute myocardial infarction) Father Kal     Heart disease Father Kal     Cancer Sister Teresa     Cancer Brother Kal     Diabetes Brother Kal     Cancer Maternal Grandmother      Cancer Maternal Grandfather Juan Luis     Cancer Paternal Grandfather Que     Diabetes Paternal Grandfather Que     Diabetes Other paternal aunt     Cancer Paternal Grandmother Amanda     Diabetes Father's Sister Fiorella     Diabetes Brother Jay     Diabetes Brother Mumtaz     Mental illness Brother Mumtaz     Diabetes Father's Sister Fiorella     Diabetes Brother Jay     Diabetes Brother Mumtaz     Mental illness Brother Mumtaz      Social History     Tobacco Use    Smoking status: Former     Current packs/day: 0.00     Types: Cigarettes     Quit date: 10/1/1989     Years since quittin.3    Smokeless tobacco: Never    Tobacco comments:     No longer an interest   Vaping Use    Vaping status: Never Used   Substance Use Topics    Alcohol use: Not Currently     Comment: Quit    Drug use: Never       Physical Exam   ED Triage Vitals [25]   Temperature Heart Rate Respirations BP   36.2 °C (97.2 °F) 76 18 126/56      Pulse Ox Temp src Heart Rate Source Patient Position   97 % -- Monitor Lying      BP Location FiO2 (%)     Left arm --       Physical Exam  Vitals and nursing note reviewed.   Constitutional:       General: She is awake. She is not in acute distress.     Appearance: Normal appearance. She is well-developed, well-groomed and overweight. She is not ill-appearing, toxic-appearing or diaphoretic.      Comments: Initial vital signs reviewed: Temperature 36.2, heart rate 76, respirations 18, blood pressure 126/56, pulse ox is 97% on room air  EKG interpreted by me at 1916 hrs. normal sinus rhythm with a left bundle branch block rate 68, , QRS was 142, QT was 432, QTc was 459 no STEMI   HENT:      Head: Normocephalic and atraumatic.       Right Ear: Tympanic membrane, ear canal and external ear normal.      Left Ear: Tympanic membrane, ear canal and external ear normal.      Nose: Nose normal.      Mouth/Throat:      Mouth: Mucous membranes are moist.      Pharynx: Oropharynx is clear.   Eyes:      Extraocular Movements: Extraocular movements intact.      Conjunctiva/sclera: Conjunctivae normal.      Pupils: Pupils are equal, round, and reactive to light.   Cardiovascular:      Rate and Rhythm: Normal rate and regular rhythm.      Pulses: Normal pulses.      Heart sounds: Normal heart sounds.   Pulmonary:      Effort: Pulmonary effort is normal.      Breath sounds: Normal breath sounds. No wheezing, rhonchi or rales.   Chest:      Chest wall: No tenderness.   Abdominal:      General: Bowel sounds are normal.      Palpations: Abdomen is soft. There is no mass.      Tenderness: There is no abdominal tenderness. There is no guarding.   Musculoskeletal:         General: No swelling or tenderness. Normal range of motion.      Cervical back: Normal range of motion and neck supple. No tenderness.      Right lower leg: No edema.      Left lower leg: No edema.   Skin:     General: Skin is warm and dry.      Capillary Refill: Capillary refill takes less than 2 seconds.      Findings: No rash.   Neurological:      General: No focal deficit present.      Mental Status: She is alert and oriented to person, place, and time. Mental status is at baseline.      GCS: GCS eye subscore is 4. GCS verbal subscore is 5. GCS motor subscore is 6.      Sensory: Sensation is intact.      Motor: Motor function is intact.      Coordination: Coordination is intact.   Psychiatric:         Attention and Perception: Attention and perception normal.         Mood and Affect: Mood and affect normal.         Speech: Speech normal.         Behavior: Behavior normal. Behavior is cooperative.         Thought Content: Thought content normal.         Cognition and Memory: Cognition and  memory normal.         Judgment: Judgment normal.           ED Course & MDM   Diagnoses as of 01/11/25 2108   Acute chest pain                 No data recorded                                 Medical Decision Making  Initial vital signs reviewed: Temperature 36.2, heart rate 76, respirations 18, blood pressure 126/56, pulse ox is 97% on room air  EKG interpreted by me at 1916 hrs. normal sinus rhythm with a left bundle branch block rate 68, , QRS was 142, QT was 432, QTc was 459 no STEMI.  Unchanged from previous EKG performed back in 10 January 2025.  The patient's lab work was reviewed white count 5.6, hemoglobin 13.3 hematocrit 39.2 platelet count was 197, metabolic panel glucose 141 otherwise within normal limits, troponin is 17, D-dimer negative less than 215, magnesium 2.14, flu negative, COVID-negative.  Urinalysis pending.  The patient's portable chest x-ray shows no acute finding.  Differential diagnosis includes viral infection, unstable angina, do not suspect PE with a negative D-dimer, no evidence of pneumonia or pneumothorax on the chest x-ray.  Given that the patient recently had a cardiac stent and concerned more about a cardiac issue and recommend admission.  I paged out to the patient's PCP for admission  2105 hrs.: Spoke with Dr. Dahl who will admit the patient to his service and he wants the patient admitted to [address] [place] [address].  I informed the patient of my conversation with her doctor and she consents to being admitted.  Repeat EKG interpreted by me at 2225 hrs. shows sinus bradycardia with a left bundle branch block rate 58, , QRS was 156, , QTc was 475 no STEMI        Procedure  Procedures     Johny Saleh PA-C  01/11/25 2108       Johny Saleh PA-C  01/11/25 [id number]

## 2025-01-12 NOTE — PROGRESS NOTES
Physical Therapy                 Therapy Communication Note    Patient Name: Lalita Dumont  MRN: [id number]  Department: [facility]  Room: [facility]/917-  Today's Date: 1/12/2025     Discipline: Physical Therapy          Missed Visit Reason: Missed Visit Reason: Other (Comment) (PT orders received; chart reviewed; pt known to this therapist due to being evaluated 1/9. Had been independent at that time. Spoke with patient who denies any changes in mobility or new homegoing concerns. Will d/c PT order at this time.)

## 2025-01-12 NOTE — PROGRESS NOTES
Occupational Therapy                 Therapy Communication Note    Patient Name: Lalita Dumont  MRN: [id number]  Department: [facility]  Room: [facility]/917-  Today's Date: 1/12/2025     Discipline: Occupational Therapy    Missed Visit Reason: Missed Visit Reason: Other (Comment) (OT orders received; chart reviewed; Pt with recent evaluation 1/9. Had been independent at that time. Spoke with patient who denies any changes in mobility or new homegoing concerns. Reports indep with I/ADLs. Will d/c OT order at this time.)    Missed Time: Attempt 0901

## 2025-01-12 NOTE — CARE PLAN
The patient's goals for the shift include      The clinical goals for the shift include pain control    Over the shift, the patient did not make progress toward the following goals. Barriers to progression include none. Recommendations to address these barriers include none.

## 2025-01-13 ENCOUNTER — APPOINTMENT (OUTPATIENT)
Dept: RADIOLOGY | Facility: HOSPITAL | Age: 65
End: 2025-01-13
Payer: COMMERCIAL

## 2025-01-13 VITALS
OXYGEN SATURATION: 93 % | BODY MASS INDEX: 35.97 KG/M2 | HEIGHT: 63 IN | TEMPERATURE: 97.2 F | WEIGHT: 203 LBS | SYSTOLIC BLOOD PRESSURE: 119 MMHG | RESPIRATION RATE: 20 BRPM | HEART RATE: 75 BPM | DIASTOLIC BLOOD PRESSURE: 60 MMHG

## 2025-01-13 LAB — BACTERIA UR CULT: NORMAL

## 2025-01-13 PROCEDURE — 2500000001 HC RX 250 WO HCPCS SELF ADMINISTERED DRUGS (ALT 637 FOR MEDICARE OP): Mod: MUE | Performed by: INTERNAL MEDICINE

## 2025-01-13 PROCEDURE — G0378 HOSPITAL OBSERVATION PER HR: HCPCS

## 2025-01-13 PROCEDURE — 2500000002 HC RX 250 W HCPCS SELF ADMINISTERED DRUGS (ALT 637 FOR MEDICARE OP, ALT 636 FOR OP/ED): Performed by: INTERNAL MEDICINE

## 2025-01-13 PROCEDURE — G0008 ADMIN INFLUENZA VIRUS VAC: HCPCS | Performed by: INTERNAL MEDICINE

## 2025-01-13 PROCEDURE — 99238 HOSP IP/OBS DSCHRG MGMT 30/<: CPT | Performed by: FAMILY MEDICINE

## 2025-01-13 PROCEDURE — 2500000004 HC RX 250 GENERAL PHARMACY W/ HCPCS (ALT 636 FOR OP/ED): Performed by: INTERNAL MEDICINE

## 2025-01-13 PROCEDURE — 99223 1ST HOSP IP/OBS HIGH 75: CPT | Performed by: INTERNAL MEDICINE

## 2025-01-13 PROCEDURE — 90662 IIV NO PRSV INCREASED AG IM: CPT | Performed by: INTERNAL MEDICINE

## 2025-01-13 PROCEDURE — 70450 CT HEAD/BRAIN W/O DYE: CPT

## 2025-01-13 PROCEDURE — 70450 CT HEAD/BRAIN W/O DYE: CPT | Performed by: RADIOLOGY

## 2025-01-13 PROCEDURE — 96372 THER/PROPH/DIAG INJ SC/IM: CPT | Performed by: INTERNAL MEDICINE

## 2025-01-13 RX ORDER — KETOROLAC TROMETHAMINE 30 MG/ML
15 INJECTION, SOLUTION INTRAMUSCULAR; INTRAVENOUS ONCE
OUTPATIENT
Start: 2025-01-13 | End: 2025-01-18

## 2025-01-13 RX ADMIN — AMLODIPINE BESYLATE 5 MG: 5 TABLET ORAL at 09:43

## 2025-01-13 RX ADMIN — ASPIRIN 81 MG: 81 TABLET, COATED ORAL at 09:43

## 2025-01-13 RX ADMIN — Medication 2000 UNITS: at 09:42

## 2025-01-13 RX ADMIN — INFLUENZA A VIRUS A/VICTORIA/4897/2022 IVR-238 (H1N1) ANTIGEN (FORMALDEHYDE INACTIVATED), INFLUENZA A VIRUS A/CALIFORNIA/122/2022 SAN-022 (H3N2) ANTIGEN (FORMALDEHYDE INACTIVATED), AND INFLUENZA B VIRUS B/MICHIGAN/01/2021 ANTIGEN (FORMALDEHYDE INACTIVATED) 0.5 ML: 60; 60; 60 INJECTION, SUSPENSION INTRAMUSCULAR at 16:20

## 2025-01-13 RX ADMIN — LEVOTHYROXINE SODIUM 25 MCG: 25 TABLET ORAL at 01:13

## 2025-01-13 RX ADMIN — HEPARIN SODIUM 5000 UNITS: 5000 INJECTION INTRAVENOUS; SUBCUTANEOUS at 01:13

## 2025-01-13 RX ADMIN — Medication 1000 MCG: at 09:42

## 2025-01-13 RX ADMIN — CLOTRIMAZOLE AND BETAMETHASONE DIPROPIONATE: 10; .64 CREAM TOPICAL at 09:45

## 2025-01-13 RX ADMIN — GLIPIZIDE 5 MG: 5 TABLET, EXTENDED RELEASE ORAL at 09:42

## 2025-01-13 RX ADMIN — CLOPIDOGREL BISULFATE 75 MG: 75 TABLET, FILM COATED ORAL at 09:42

## 2025-01-13 RX ADMIN — FAMOTIDINE 20 MG: 20 TABLET, FILM COATED ORAL at 09:42

## 2025-01-13 RX ADMIN — HEPARIN SODIUM 5000 UNITS: 5000 INJECTION INTRAVENOUS; SUBCUTANEOUS at 09:41

## 2025-01-13 RX ADMIN — SERTRALINE HYDROCHLORIDE 50 MG: 50 TABLET, FILM COATED ORAL at 09:42

## 2025-01-13 NOTE — NURSING NOTE
AVS reviewed with patient and verbalized understanding. All belongings sent with patient. IV removed cath intact 0 redness, tele removed. Flu shot given and VIS with pt.

## 2025-01-13 NOTE — H&P
"History Of Present Illness  Lalita Dumont is a 65 y.o. female presenting with      The patient is a pleasant 65-year-old female who presents to emergency room with chief complaint of a sudden onset of fever, chills, body aches, chest tightness, cough with a headache.  The symptoms began a few hours ago at home.  She did take acetaminophen for the pain with no relief.  The chest discomfort \"comes and goes\" she describes it as sharp worse with deep inspiration and coughing.  She denies any hemoptysis.  Is mostly localized in the right side of her chest.  She denies any history of DVT or PE, heart palpitations, diaphoresis, dizziness or near syncope.  Her headache, fever chills and bodyaches are constant, nothing makes the symptoms worse or better.  She denies any recent ill contacts.  The patient and her  are the primary historians.  The patient recently underwent a cardiac catheterization on January 9, 2025 performed by Dr. Blanchard where she underwent stenting of the circumflex coronary artery, distal Cx lesion which was stented, the report also reads left main coronary artery is normal, left anterior descending artery presents luminal irregularities and contains patent previously placed stents, circumflex coronary artery significant obstructive, distal lesion of the circumflex stenosis 75% this was stented, the right coronary artery presents luminal irregularities and contains patent previously placed stents, mid RCA lesion with 40% stenosis in the left ventricular ejection fraction 55%.  She has a past medical history of migraines, CAD status post stents, ABHISHEK positive, asthma, atypical neuralgia, chronic low back pain, previous CVA, depression, dizziness, fatigue, GERD, hiatal hernia, hypertension, hypothyroidism, mixed hyperlipidemia, nocturia, osteoarthritis of the lumbar spine, rheumatoid arthritis, tremors, type 2 diabetes, fibromyalgia, sinusitis, bronchitis, former smoker, angina pectoris.     Past " Medical History  She has a past medical history of Allergic, Arthritis, Asthma, CHF (congestive heart failure), Coronary artery disease, Diabetes mellitus (Multi), Disease of thyroid gland, Headache, Heart disease, Hypertension, Personal history of other diseases of the circulatory system, Personal history of other diseases of the musculoskeletal system and connective tissue, Personal history of other diseases of the respiratory system, Sleep apnea, Stroke (Multi), and Vitamin D deficiency, unspecified.    Surgical History  She has a past surgical history that includes Tubal ligation (09/24/2015); Carpal tunnel release (09/24/2015); Other surgical history (10/23/2021); Other surgical history (10/23/2021); Other surgical history (10/23/2021); Other surgical history (10/23/2021); MR angio head wo IV contrast (03/05/2020); MR angio neck wo IV contrast (03/05/2020); MR angio head wo IV contrast (10/10/2023); Cardiac catheterization; Coronary stent placement; Cardiac catheterization (N/A, 1/9/2025); and Cardiac catheterization (N/A, 1/9/2025).     Social History  She reports that she quit smoking about 35 years ago. Her smoking use included cigarettes. She has never used smokeless tobacco. She reports that she does not currently use alcohol. She reports that she does not use drugs.    Family History  Family History   Problem Relation Name Age of Onset    Cancer Mother Stephanie     Other (acute myocardial infarction) Father Kal     Heart disease Father Kal     Cancer Sister Teresa     Cancer Brother Kal     Diabetes Brother Kal     Cancer Maternal Grandmother      Cancer Maternal Grandfather Juan Luis     Cancer Paternal Grandfather Que     Diabetes Paternal Grandfather Que     Diabetes Other paternal aunt     Cancer Paternal Grandmother Amanda     Diabetes Father's Sister Fiorella     Diabetes Brother Jay     Diabetes Brother Mumtaz     Mental illness Brother Mumtaz     Diabetes Father's Sister Fiorella      Diabetes Brother Jay     Diabetes Brother Mumtaz     Mental illness Brother Mumtaz         Allergies  Bactrim [sulfamethoxazole-trimethoprim], Codeine, Darvocet a500 [propoxyphene n-acetaminophen], Erythromycin, Lisinopril, Omeprazole, Penicillins, and Azithromycin    Review of Systems   Constitutional:  Negative for fatigue, fever and unexpected weight change.   HENT:  Negative for congestion and sore throat.    Respiratory:  Negative for cough, shortness of breath and wheezing.    Cardiovascular:  Negative for chest pain, palpitations and leg swelling.   Gastrointestinal:  Negative for abdominal pain, constipation, diarrhea and nausea.   Genitourinary:  Negative for dysuria, flank pain and hematuria.   Musculoskeletal:  Negative for arthralgias, joint swelling and myalgias.   Skin:  Negative for rash.   Neurological:  Negative for syncope, light-headedness and headaches.   Psychiatric/Behavioral:  Negative for agitation. The patient is not nervous/anxious.         Physical Exam  Vitals and nursing note reviewed.   Constitutional:       General: She is not in acute distress.     Appearance: She is not ill-appearing or toxic-appearing.   HENT:      Head: Normocephalic and atraumatic.      Mouth/Throat:      Pharynx: No oropharyngeal exudate or posterior oropharyngeal erythema.   Eyes:      Extraocular Movements: Extraocular movements intact.      Conjunctiva/sclera: Conjunctivae normal.      Pupils: Pupils are equal, round, and reactive to light.   Cardiovascular:      Rate and Rhythm: Normal rate and regular rhythm.      Pulses: Normal pulses.      Heart sounds: Normal heart sounds. No murmur heard.  Pulmonary:      Effort: Pulmonary effort is normal.      Breath sounds: Normal breath sounds. No wheezing, rhonchi or rales.   Abdominal:      General: Abdomen is flat. Bowel sounds are normal. There is no distension.      Palpations: Abdomen is soft. There is no mass.      Tenderness: There is no abdominal  "tenderness.      Hernia: No hernia is present.   Musculoskeletal:         General: No swelling or deformity. Normal range of motion.      Cervical back: Normal range of motion and neck supple.   Skin:     General: Skin is warm and dry.      Capillary Refill: Capillary refill takes less than 2 seconds.      Coloration: Skin is not jaundiced.      Findings: No erythema or rash.   Neurological:      General: No focal deficit present.      Mental Status: She is oriented to person, place, and time. Mental status is at baseline.   Psychiatric:         Mood and Affect: Mood normal.         Behavior: Behavior normal.         Thought Content: Thought content normal.         Judgment: Judgment normal.           Last Recorded Vitals  Blood pressure 95/50, pulse 67, temperature 36.4 °C (97.5 °F), resp. rate 16, height 1.6 m (5' 3\"), weight 92.1 kg (203 lb), SpO2 93%.    Relevant Results    Scheduled medications  amLODIPine, 5 mg, oral, Daily  aspirin, 81 mg, oral, Daily  atorvastatin, 40 mg, oral, Nightly  cholecalciferol, 2,000 Units, oral, BID  clopidogrel, 75 mg, oral, Daily  clotrimazole-betamethasone, , Topical, BID  cyanocobalamin, 1,000 mcg, oral, Daily  famotidine, 20 mg, oral, BID   Or  famotidine, 20 mg, intravenous, BID  influenza, 0.5 mL, intramuscular, During hospitalization  glipiZIDE XL, 5 mg, oral, Daily  heparin (porcine), 5,000 Units, subcutaneous, q8h  hydroCHLOROthiazide, 12.5 mg, oral, Daily  isosorbide mononitrate ER, 60 mg, oral, Daily  levothyroxine, 25 mcg, oral, Daily  pneumoc 20-ashwin conj-dip cr(PF), 0.5 mL, intramuscular, During hospitalization  sertraline, 50 mg, oral, Daily  spironolactone, 25 mg, oral, Daily      Continuous medications     PRN medications  PRN medications: acetaminophen **OR** acetaminophen **OR** acetaminophen, albuterol, dextromethorphan-guaifenesin, ipratropium-albuteroL, morphine, nitroglycerin, ondansetron **OR** ondansetron, oxygen, sennosides-docusate sodium   Results for " orders placed or performed during the hospital encounter of 01/11/25 (from the past 24 hours)   Urinalysis with Reflex Culture and Microscopic   Result Value Ref Range    Color, Urine Yellow Light-Yellow, Yellow, Dark-Yellow    Appearance, Urine Clear Clear    Specific Gravity, Urine 1.023 1.005 - 1.035    pH, Urine 7.0 5.0, 5.5, 6.0, 6.5, 7.0, 7.5, 8.0    Protein, Urine NEGATIVE NEGATIVE, 10 (TRACE), 20 (TRACE) mg/dL    Glucose, Urine Normal Normal mg/dL    Blood, Urine NEGATIVE NEGATIVE    Ketones, Urine NEGATIVE NEGATIVE mg/dL    Bilirubin, Urine NEGATIVE NEGATIVE    Urobilinogen, Urine 2 (1+) (A) Normal mg/dL    Nitrite, Urine NEGATIVE NEGATIVE    Leukocyte Esterase, Urine 75 Alex/uL (A) NEGATIVE   Extra Urine Gray Tube   Result Value Ref Range    Extra Tube Hold for add-ons.    Microscopic Only, Urine   Result Value Ref Range    WBC, Urine 1-5 1-5, NONE /HPF    RBC, Urine NONE NONE, 1-2, 3-5 /HPF    Amorphous Crystals, Urine 1+ NONE, 1+, 2+ /HPF   Troponin, High Sensitivity, 1 Hour   Result Value Ref Range    Troponin I, High Sensitivity 19 (H) 0 - 13 ng/L   ECG 12 lead   Result Value Ref Range    Ventricular Rate 58 BPM    Atrial Rate 58 BPM    OR Interval 196 ms    QRS Duration 156 ms    QT Interval 484 ms    QTC Calculation(Bazett) 475 ms    P Axis 65 degrees    R Axis -14 degrees    T Axis 101 degrees    QRS Count 9 beats    Q Onset 203 ms    P Onset 105 ms    P Offset 151 ms    T Offset 445 ms    QTC Fredericia 478 ms      ECG 12 Lead    Result Date: 1/12/2025  Sinus bradycardia Left bundle branch block Abnormal ECG Confirmed by Korey Barragan ([id number]) on 1/12/2025 10:06:31 AM    Electrocardiogram 12 Lead    Result Date: 1/12/2025  Sinus bradycardia Left bundle branch block Abnormal ECG Confirmed by Korey Barragan ([id number]) on 1/12/2025 9:30:34 AM    ECG 12 Lead    Result Date: 1/12/2025  Sinus bradycardia with 1st degree AV block Left bundle branch block Abnormal ECG Confirmed by Korey Barragan  ([id number]) on 1/12/2025 9:30:07 AM    ECG 12 lead    Result Date: 1/12/2025  Sinus bradycardia Left bundle branch block Abnormal ECG When compared with ECG of 11-JAN-2025 22:24, (unconfirmed) No significant change was found    XR chest 1 view    Result Date: 1/11/2025  STUDY: Chest Radiograph;  1/11/2025 7:33 PM INDICATION: Chest pain. COMPARISON: None Available. ACCESSION NUMBER(S): [id number] ORDERING CLINICIAN: HUONG ARCHIBALD TECHNIQUE:  Frontal chest was obtained at 19:31 hours. FINDINGS: No acute opacities or effusions are visualized.  Heart size is within normal limits.  There is no evidence of a pneumothorax.    No acute findings on single AP view of the chest. Signed by Bal Hernandez MD    ECG 12 lead    Result Date: 1/11/2025  Normal sinus rhythm Left bundle branch block Abnormal ECG When compared with ECG of 10-MANUEL-2025 06:40, (unconfirmed) QRS duration has decreased    Cardiac Catheterization Procedure    Result Date: 1/9/2025   [facility], Cath Lab        [address] [place] [address] Cardiovascular Catheterization Report Patient Name:     HALEIGH CAPELLAN     Performing Physician:  Ange Blanchard MD Study Date:       1/9/2025           Verifying Physician:   Ange Blanchard MD MRN/PID:          [id number]           Cardiologist/Co-Scrub: Accession#:       [id number]       Ordering Provider:     [id number] ROMAN PRUITT Date of           1960 / 65      Cardiologist: Birth/Age:        years Gender:           F                  Fellow: Encounter#:       [id number]         Surgeon:  Study:            Left Heart Cath Additional Study: Coronary Arteriogram Additional Study: Left Ventriculogram Additional Study: PCI - Percutaneous Coronary Intervention  Indications: HALEIGH CAPELLAN is a 65 year old female who presents with dyslipidemia, hypertension and a chest pain assessment of typical angina. Worsening angina. Stress  test performed: No. CTA performed: NoAryan Trinidad accessed: No. LVEF Assessed: No. Cardiac arrest: No. Cardiac surgical consult: No. Cardiovascular Instability: No Frailty status of patient entering lab: 4 = Vulnerable.  Coronary Angiography: The coronary circulation is co-dominant.  Left Main Coronary Artery: The left main coronary artery is free of atherosclerotic disease.  Left Anterior Descending Coronary Artery Distribution: The Left Anterior Descending artery is a large vessel. Presents luminal irregularities and contains patent previously placed stents.  Circumflex Coronary Artery Distribution: The Left Circumflex artery is a large vessel. Hemodynamically significant obstruction is noted in this vessel. There is 75% stenosis in the the distal Circumflex artery. The devices advanced to the the distal CX lesion were:Resolute [place] 2.75x18 stent was deployed in the lesion a balloon was inflated for post-dilation. Residual stenosis is 0%.  Right Coronary Artery Distribution: The Right Coronary Artery is a medium-sized vessel. Presents luminal irregularities and contains patent previously placed stents. There is 40% stenosis in the the mid Right Coronary Artery.  Left Ventriculography: The estimated left ventricular ejection fraction is normal at 55%.  Coronary Interventions: Angiography reveals a 75% stenosis of the distal circumflex coronary artery. Pre-intervention MAURICE flow was 3. Percutaneous coronary intervention was performed within the distal circumflex. [place] drug-eluting stent 2.75 mm x 18 mm was advanced to the lesion and implanted at 12 LEATHA. The stent was post dilated using a non-compliant balloon 2.75 mm x 15 mm at 20 LEATHA. The stenosis was successfully reduced from 75% to 0%. Post-intervention MAURICE flow was 3.  Hemo Personnel: +-------------------------+------------+ Name                     Duty         +-------------------------+------------+ Tae Blanchard MD, MD 1  +-------------------------+------------+ Erin Orta RN          PROC SCRUB 1 +-------------------------+------------+ Michelle Bowen RN        PROC CIRC 1 +-------------------------+------------+ Deya Pal 1 +-------------------------+------------+  Hemodynamic Pressures:  +----+--------------------+----------+-------------+--------------+---------+ Site     Date Time      Phase NameSystolic mmHgDiastolic mmHgMean mmHg +----+--------------------+----------+-------------+--------------+---------+   AO1/9/2025 12:49:07 PM  AIR REST            9             0        8 +----+--------------------+----------+-------------+--------------+---------+   AO 1/9/2025 1:06:24 PM  AIR REST           91            49       68 +----+--------------------+----------+-------------+--------------+---------+  Cardiac Cath Post Procedure Notes: Post Procedure Diagnosis: RASHARD of Circumflex. Blood Loss:               Estimated blood loss during the procedure was 2 mls. Specimens Removed:        Number of specimen(s) removed: none. ____________________________________________________________________________________ CONCLUSIONS:  1. Left Main Coronary Artery: This artery is normal.  2. Left Anterior Descending Artery: presents luminal irregularities and contains patent previously placed stents.  3. Circumflex Coronary Artery: significantly obstructed.  4. Distal CX Lesion: The percent stenosis is 75%.  5. Distal CX Lesion: Resolute [place] 2.75x18 post-dilation: 0% residual stenosis. distal CX: pre-procedure MAURICE flow was 3(complete perfusion) and post-procedure MAURICE flow was 3(complete perfusion).  6. Right Coronary Artery: presents luminal irregularities and contains patent previously placed stents.  7. Mid RCA Lesion: The percent stenosis is 40%.  8. The Left Ventricular Ejection Fraction is 55%. ICD 10 Codes: Angina pectoris, unspecified-I20.9  CPT Codes: Left Heart Cath  (visualization of coronaries) and LV-03016; Stent w angioplasty Left Circumflex single major Artery branch (PCI)-65521.LC; Moderate Sedation Services 1st additional 15 minutes patient >5 years-62432; Moderate Sedation Services 2nd additional 15 minutes patient >5 years-85591  [id number] Tae Blanchard MD Performing Physician Electronically signed by [id number] Blanchard MD on 1/9/2025 at 1:34:00 PM  ** Final **     Transthoracic Echo (TTE) Complete    Result Date: 1/9/2025          [place] [address]  Tel [phone] Fax [phone] TRANSTHORACIC ECHOCARDIOGRAM REPORT Patient Name:       HALEIGH CAPELLAN      Reading Physician:    [id number] Randy Arreaga MD, [facility] Study Date:         1/9/2025            Ordering Provider:    [id number] RANDY WATERS MRN/PID:            [id number]            Fellow: Accession#:         [id number]        Nurse:                Marin Al RN Date of Birth/Age:  1960 / 65 years Sonographer:          Zofia Chang RDCS Gender Assigned at  F                   Additional Staff: Birth: Height:             160.02 cm           Admit Date:           1/8/2025 Weight:             92.08 kg            Admission Status:     Inpatient -                                                               Routine BSA / BMI:          1.95 m2 / 35.96     Department Location:  [place] [address]                                     Echo Lab Blood Pressure: 126 /66 mmHg Study Type:    TRANSTHORACIC ECHO (TTE) COMPLETE Diagnosis/ICD: Chest pain, unspecified-R07.9 Indication:    Chest Pain CPT Codes:     Echo Complete w Full Doppler-18760 Patient History: Diabetes:          Yes Pertinent History: CAD, Chest Pain,  CVA, HTN, Hyperlipidemia and CHF. Study Detail: The following Echo studies were performed: 2D, M-Mode, Doppler and               color flow. Technically challenging study due to body habitus.               Definity used as a contrast agent for endocardial border               definition. Total contrast used for this procedure was 1 mL via IV               push.  PHYSICIAN INTERPRETATION: Left Ventricle: The left ventricular systolic function is mildly decreased, with a visually estimated ejection fraction of 45-50%. There is global hypokinesis of the left ventricle with minor regional variations. The left ventricular cavity size is upper limits of normal. Left ventricular diastolic filling was indeterminate. Left Atrium: The left atrium is normal in size. Right Ventricle: The right ventricle is normal in size. There is normal right ventricular global systolic function. Right Atrium: The right atrium is normal in size. Aortic Valve: The aortic valve is trileaflet. The aortic valve dimensionless index is 0.76. There is trace aortic valve regurgitation. The peak instantaneous gradient of the aortic valve is 7 mmHg. The mean gradient of the aortic valve is 4 mmHg. Trace AI, No aortic stenosis. Mitral Valve: The mitral valve is normal in structure. There is trace mitral valve regurgitation. Trace MR. Tricuspid Valve: The tricuspid valve is structurally normal. There is trace to mild tricuspid regurgitation. The Doppler estimated RVSP is within normal limits at 9.8 mmHg. Pulmonic Valve: The pulmonic valve is structurally normal. There is no indication of pulmonic valve regurgitation. Pericardium: No pericardial effusion noted. Aorta: The aortic root is normal. In comparison to the previous echocardiogram(s): Prior echo studies unavailable for comparison at this time.  CONCLUSIONS:  1. The left ventricular systolic function is mildly decreased, with a visually estimated ejection fraction of 45-50%.  2. There is global  hypokinesis of the left ventricle with minor regional variations.  3. Left ventricular diastolic filling was indeterminate.  4. There is normal right ventricular global systolic function.  5. Trace MR.  6. Right ventricular systolic pressure is within normal limits.  7. Trace AI, No aortic stenosis.  8. Normal valve function with trivial clinically insignificant AI/MR.  9. Prior echo studies unavailable for comparison at this time. QUANTITATIVE DATA SUMMARY:  2D MEASUREMENTS:           Normal Ranges: Ao Root d:       2.30 cm   (2.0-3.7cm) IVSd:            1.00 cm   (0.6-1.1cm) LVPWd:           0.91 cm   (0.6-1.1cm) LVIDd:           4.98 cm   (3.9-5.9cm) LVIDs:           3.81 cm LV Mass Index:   87.2 g/m2 LV % FS          23.5 %  LA VOLUME:                    Normal Ranges: LA Vol A4C:        31.8 ml    (22+/-6mL/m2) LA Vol A2C:        32.7 ml LA Vol BP:         32.9 ml LA Vol Index A4C:  16.3ml/m2 LA Vol Index A2C:  16.8 ml/m2 LA Vol Index BP:   16.9 ml/m2 LA Area A4C:       13.4 cm2 LA Area A2C:       13.3 cm2 LA Major Axis A4C: 4.8 cm LA Major Axis A2C: 4.6 cm LA Volume Index:   15.9 ml/m2  RA VOLUME BY A/L METHOD:            Normal Ranges: RA Vol A4C:              26.1 ml    (8.3-19.5ml) RA Vol Index A4C:        13.4 ml/m2 RA Area A4C:             11.5 cm2 RA Major Axis A4C:       4.3 cm  AORTA MEASUREMENTS:         Normal Ranges: Asc Ao, d:          3.40 cm (2.1-3.4cm)  LV SYSTOLIC FUNCTION BY 2D PLANIMETRY (MOD):                      Normal Ranges: EF-A4C View:    44 % (>=55%) EF-A2C View:    45 % EF-Biplane:     45 % EF-Visual:      48 % LV EF Reported: 48 %  LV DIASTOLIC FUNCTION:           Normal Ranges: MV Peak E:             0.69 m/s  (0.7-1.2 m/s) MV Peak A:             0.98 m/s  (0.42-0.7 m/s) E/A Ratio:             0.70      (1.0-2.2) MV e'                  0.080 m/s (>8.0) MV lateral e'          0.09 m/s MV medial e'           0.07 m/s E/e' Ratio:            8.60      (<8.0)  MITRAL VALVE:           Normal Ranges: MV DT:        216 msec (150-240msec)  AORTIC VALVE:                     Normal Ranges: AoV Vmax:                1.31 m/s (<=1.7m/s) AoV Peak P.9 mmHg (<20mmHg) AoV Mean P.0 mmHg (1.7-11.5mmHg) LVOT Max Joe:            1.04 m/s (<=1.1m/s) AoV VTI:                 28.40 cm (18-25cm) LVOT VTI:                21.50 cm LVOT Diameter:           2.00 cm  (1.8-2.4cm) AoV Area, VTI:           2.38 cm2 (2.5-5.5cm2) AoV Area,Vmax:           2.49 cm2 (2.5-4.5cm2) AoV Dimensionless Index: 0.76  AORTIC INSUFFICIENCY: AI Vmax:       4.20 m/s AI Half-time:  521 msec AI Decel Rate: 236.00 cm/s2  RIGHT VENTRICLE: RV Basal 2.26 cm RV Mid   2.15 cm RV Major 6.9 cm TAPSE:   18.1 mm RV s'    0.10 m/s  TRICUSPID VALVE/RVSP:          Normal Ranges: Peak TR Velocity:     1.30 m/s RV Syst Pressure:     10 mmHg  (< 30mmHg) IVC Diam:             1.28 cm  PULMONIC VALVE:          Normal Ranges: PV Accel Time:  82 msec  (>120ms) PV Max Joe:     0.9 m/s  (0.6-0.9m/s) PV Max PG:      3.2 mmHg  [id number] Randy Arreaga MD, [facility] Electronically signed on 2025 at 8:56:19 AM  ** Final **     CT angio chest for pulmonary embolism    Result Date: 2025  Interpreted By:  Luis Calabrese, STUDY: CT ANGIO CHEST FOR PULMONARY EMBOLISM;  2025 7:02 pm   INDICATION: Signs/Symptoms:+ dimer.   COMPARISON: None   ACCESSION NUMBER(S): [id number]   ORDERING CLINICIAN: HIRO EVANGELISTA   TECHNIQUE: Helical data acquisition of the chest was obtained after intravenous administration of , as per PE protocol. Images were reformatted in coronal and sagittal planes. Axial and coronal maximum intensity projection (MIP) images were created and reviewed.   FINDINGS: POTENTIAL LIMITATIONS OF THE STUDY: None   HEART AND VESSELS:   There are no discrete filling defects within main pulmonary artery and its branches to suggest acute pulmonary embolism.   Main pulmonary artery and its branches are normal in caliber.   The thoracic  aorta normal in course and caliber.   Coronary artery stent in place suspected. No significant coronary artery calcifications are seen. Please note, the study is not optimized for evaluation of coronary arteries.   The cardiac chambers are not enlarged.   3 mm ground-glass nodules within the minor fissure on image 124 and 129/279 similar to prior. Findings are nonspecific in etiology and may represent fissural lymph nodes   MEDIASTINUM AND RADHA, LOWER NECK AND AXILLA:   The visualized thyroid gland is within normal limits.   No evidence of thoracic lymphadenopathy by CT criteria.   Esophagus appears within normal limits as seen.   LUNGS AND AIRWAYS: The trachea and central airways are patent. No endobronchial lesion is seen.   The bilateral lungs are clear without evidence of focal consolidation, pleural effusion, or pneumothorax.     UPPER ABDOMEN: The visualized subdiaphragmatic structures demonstrate no remarkable findings.   CHEST WALL AND OSSEOUS STRUCTURES:   Chest wall is within normal limits. No acute osseous pathology.There are no suspicious osseous lesions.       1. Coronary artery stent in place. No evidence of acute pulmonary embolism. 2. Nonspecific pleural base ground-glass nodules within the right minor fissure similar to prior, stable and nonspecific in nature. Findings may represent fissural lymph node. 3. Additional detailed findings as above.     MACRO: None   Signed by: Luis Calabrese 1/8/2025 7:41 PM Dictation workstation:   [id number]    ECG 12 lead    Result Date: 1/8/2025  Normal sinus rhythm Left axis deviation Left bundle branch block Abnormal ECG When compared with ECG of 08-JAN-2025 17:02, (unconfirmed) No significant change was found    XR chest 1 view    Result Date: 1/8/2025  Interpreted By:  Sosa Lazaro, STUDY: XR CHEST 1 VIEW;  1/8/2025 5:18 pm   INDICATION: Signs/Symptoms:Chest Pain.   COMPARISON: Chest x-ray 02/02/2023   ACCESSION NUMBER(S): [id number]   ORDERING CLINICIAN:  HIRO EVANGELISTA   FINDINGS:     CARDIOMEDIASTINAL SILHOUETTE: Cardiomediastinal silhouette is normal in size and configuration. Atherosclerotic calcification of the aorta.   LUNGS: No consolidation, pleural effusion or pneumothorax.   ABDOMEN: No remarkable upper abdominal findings.   BONES: Multilevel degenerative changes of the spine.       No acute cardiopulmonary process.   MACRO: None   Signed by: Sosa Lazaro 1/8/2025 5:55 PM Dictation workstation:   [id number]    ECG 12 lead    Result Date: 1/8/2025  Normal sinus rhythm Left axis deviation Left bundle branch block Abnormal ECG When compared with ECG of 09-OCT-2023 21:17, Previous ECG has undetermined rhythm, needs review    ECG 12 lead    Result Date: 1/12/2025  Sinus bradycardia Left bundle branch block Abnormal ECG When compared with ECG of 11-JAN-2025 22:24, (unconfirmed) No significant change was found    XR chest 1 view    Result Date: 1/11/2025  STUDY: Chest Radiograph;  1/11/2025 7:33 PM INDICATION: Chest pain. COMPARISON: None Available. ACCESSION NUMBER(S): [id number] ORDERING CLINICIAN: HUONG ARCHIBALD TECHNIQUE:  Frontal chest was obtained at 19:31 hours. FINDINGS: No acute opacities or effusions are visualized.  Heart size is within normal limits.  There is no evidence of a pneumothorax.    No acute findings on single AP view of the chest. Signed by Bal Hernandez MD    ECG 12 lead    Result Date: 1/11/2025  Normal sinus rhythm Left bundle branch block Abnormal ECG When compared with ECG of 10-MANUEL-2025 06:40, (unconfirmed) QRS duration has decreased           Assessment/Plan   Assessment & Plan  Acute chest pain    Asthma    CAD S/P percutaneous coronary angioplasty    Chest pain    CVA (cerebral vascular accident) (Multi)    Rheumatoid arthritis    Type 2 diabetes mellitus with hyperglycemia (Multi)    URI, acute      Assessment & Plan  Acute chest pain    Patient be admitted.  ACS protocol.  Cardiology consultation.  Continue current  medical therapy in the interim.  Symptomatic treatment.  Discussed discharge planning with the patient.  Await recommendations from cardiology.          I spent   minutes in the professional and overall care of this patient.      Chito Noyola MD

## 2025-01-13 NOTE — CONSULTS
Cardiology Consult Note      Date:   1/13/2025  Patient name:  Lalita Dumont  Date of admission:  1/11/2025  7:09 PM  MRN:   [id number]  YOB: 1960  Time of Consult:  8:42 AM    Consulting Cardiologist: Dr. Wong Mills, APRN, CNP  Primary Cardiologist:  Dr. Tae Blanchard    Referring Provider: Dr Noyola      Admission Diagnosis:     Acute chest pain      History of Present Illness:      Lalita Dumont is a 65 y.o.  female patient who is being at the request of Dr. Noyola for inpatient consultation of angina. He was admitted on 1/11/2025.  Previous [facility] and [facility] records have been reviewed in detail.    Patient with a history of CAD, hypertension, diabetes, dyslipidemia, fibromyalgia  Patient was here on 1/9/2025 where she was complaining of chest pain woke her up in the middle the night we took her for heart catheterization finding revealed a 75% circumflex complex lesion she received a stent she states that she went home.  She states that on the 11th she felt like she had some cough and congestion and then she developed a strong headache that was in the back of her head she states also when she took a deep breath she was having some chest pressure so they brought her into the emergency department.  They admitted her to the ninth floor asked cardiology to get involved with her case.  With the low-grade fever and the cough and congestion and a headache they thought that she had an upper respiratory infection.  Her chest pain is only when she takes a deep breath is very atypical in nature.  She states that her fever when she was at home was 100.4.  Positive for headache, chest pain with deep breathing, cough and fever  Denies shortness of breath, chills, nausea, vomiting, PND, orthopnea, claudication  EKG is sinus bradycardia with left bundle branch block no acute changes    D-dimer less than 215  Troponin 17, 19  Magnesium 2.14  WBC is 5.6    Cardiac history  1/9/25  cath  CONCLUSIONS:   1. Left Main Coronary Artery: This artery is normal.   2. Left Anterior Descending Artery: presents luminal irregularities and contains patent previously placed stents.   3. Circumflex Coronary Artery: significantly obstructed.   4. Distal CX Lesion: The percent stenosis is 75%.   5. Distal CX Lesion: Resolute Driver 2.75x18 post-dilation: 0% residual stenosis. distal CX: pre-procedure MAURICE flow was 3(complete perfusion) and post-procedure MAURICE flow was 3(complete perfusion).   6. Right Coronary Artery: presents luminal irregularities and contains patent previously placed stents.   7. Mid RCA Lesion: The percent stenosis is 40%.   8. The Left Ventricular Ejection Fraction is 55%.     1/8/25  CONCLUSIONS:   1. The left ventricular systolic function is mildly decreased, with a visually estimated ejection fraction of 45-50%.   2. There is global hypokinesis of the left ventricle with minor regional variations.   3. Left ventricular diastolic filling was indeterminate.   4. There is normal right ventricular global systolic function.   5. Trace MR.   6. Right ventricular systolic pressure is within normal limits.   7. Trace AI, No aortic stenosis.   8. Normal valve function with trivial clinically insignificant AI/MR.   9. Prior echo studies unavailable for comparison at this time.         Allergies:     Allergies   Allergen Reactions    Bactrim [Sulfamethoxazole-Trimethoprim] GI Upset    Codeine Swelling    Darvocet A500 [Propoxyphene N-Acetaminophen] Hives    Erythromycin Itching and Swelling    Lisinopril Swelling    Omeprazole Hives    Penicillins Swelling    Azithromycin Rash         Past Medical History:     Past Medical History:   Diagnosis Date    Allergic     Arthritis     Asthma     CHF (congestive heart failure)     Coronary artery disease     Diabetes mellitus (Multi)     Disease of thyroid gland     Headache     Heart disease     Hypertension     Personal history of other diseases of the  circulatory system     History of hypertension    Personal history of other diseases of the musculoskeletal system and connective tissue     History of rheumatoid arthritis    Personal history of other diseases of the respiratory system     History of asthma    Sleep apnea     Stroke (Multi)     Vitamin D deficiency, unspecified     Vitamin D deficiency       Past Surgical History:     Past Surgical History:   Procedure Laterality Date    CARDIAC CATHETERIZATION      CARDIAC CATHETERIZATION N/A 1/9/2025    Procedure: Left Heart Cath;  Surgeon: Tae Blanchard MD;  Location: [facility];  Service: Cardiovascular;  Laterality: N/A;    CARDIAC CATHETERIZATION N/A 1/9/2025    Procedure: PCI RASHARD Stent- Coronary;  Surgeon: Tae Blanchard MD;  Location: [facility];  Service: Cardiovascular;  Laterality: N/A;    CARPAL TUNNEL RELEASE  09/24/2015    Neuroplasty Decompression Median Nerve At Carpal Tunnel    CORONARY STENT PLACEMENT      MR HEAD ANGIO WO IV CONTRAST  03/05/2020    MR HEAD ANGIO WO IV CONTRAST 3/5/2020 [facility] CLINICAL LEGACY    MR HEAD ANGIO WO IV CONTRAST  10/10/2023    MR HEAD ANGIO WO IV CONTRAST 10/10/2023 [facility] MRI    MR NECK ANGIO WO IV CONTRAST  03/05/2020    MR NECK ANGIO WO IV CONTRAST 3/5/2020 [facility] CLINICAL LEGACY    OTHER SURGICAL HISTORY  10/23/2021    Dilation and curettage    OTHER SURGICAL HISTORY  10/23/2021    Colonoscopy    OTHER SURGICAL HISTORY  10/23/2021    Esophagogastroduodenoscopy    OTHER SURGICAL HISTORY  10/23/2021    Cardiac catheterization with stent placement    TUBAL LIGATION  09/24/2015    Tubal Ligation       Family History:     Family History   Problem Relation Name Age of Onset    Cancer Mother Stephanie     Other (acute myocardial infarction) Father Kal     Heart disease Father Kal     Cancer Sister Teresa     Cancer Brother Kal     Diabetes Brother Kal     Cancer Maternal Grandmother      Cancer Maternal Grandfather Juan Luis     Cancer Paternal  Grandfather Que     Diabetes Paternal Grandfather Que     Diabetes Other paternal aunt     Cancer Paternal Grandmother Amanda     Diabetes Father's Sister Fiorella     Diabetes Brother Jay     Diabetes Brother Mumtaz     Mental illness Brother Mumtaz     Diabetes Father's Sister Fiorella     Diabetes Brother Jay     Diabetes Brother Mumtaz     Mental illness Brother Mumtaz        Social History:     Social History     Tobacco Use    Smoking status: Former     Current packs/day: 0.00     Types: Cigarettes     Quit date: 10/1/1989     Years since quittin.3    Smokeless tobacco: Never    Tobacco comments:     No longer an interest   Vaping Use    Vaping status: Never Used   Substance Use Topics    Alcohol use: Not Currently     Comment: Quit    Drug use: Never       CURRENT INPATIENT MEDICATIONS    amLODIPine, 5 mg, oral, Daily  aspirin, 81 mg, oral, Daily  atorvastatin, 40 mg, oral, Nightly  cholecalciferol, 2,000 Units, oral, BID  clopidogrel, 75 mg, oral, Daily  clotrimazole-betamethasone, , Topical, BID  cyanocobalamin, 1,000 mcg, oral, Daily  famotidine, 20 mg, oral, BID   Or  famotidine, 20 mg, intravenous, BID  influenza, 0.5 mL, intramuscular, During hospitalization  glipiZIDE XL, 5 mg, oral, Daily  heparin (porcine), 5,000 Units, subcutaneous, q8h  [Held by provider] hydroCHLOROthiazide, 12.5 mg, oral, Daily  [Held by provider] isosorbide mononitrate ER, 60 mg, oral, Daily  levothyroxine, 25 mcg, oral, Daily  pneumoc 20-ashwin conj-dip cr(PF), 0.5 mL, intramuscular, During hospitalization  sertraline, 50 mg, oral, Daily  [Held by provider] spironolactone, 25 mg, oral, Daily         Current Outpatient Medications   Medication Instructions    acetaminophen (TYLENOL) 650 mg, Every 4 hours PRN    albuterol 90 mcg/actuation inhaler 2 puffs, Every 6 hours PRN    amLODIPine (NORVASC) 5 mg, oral, Daily    aspirin 81 mg EC tablet 1 tablet, Daily    atorvastatin (LIPITOR) 40 mg, oral, Daily    cholecalciferol  (VITAMIN D-3) 2,000 Units, 2 times daily    clopidogrel (PLAVIX) 75 mg, oral, Daily    clotrimazole-betamethasone (Lotrisone) cream Apply sparingly to corners of mouth twice a day for up to 2 weeks.  Avoid ingestion.    cyanocobalamin (Vitamin B-12) 1,000 mcg tablet 1 tablet, Daily    glipiZIDE XL (GLUCOTROL XL) 5 mg, oral, Daily, Do not crush, chew, or split.    hydroCHLOROthiazide (MICROZIDE) 12.5 mg, oral, Daily    ipratropium-albuteroL (Duo-Neb) 0.5-2.5 mg/3 mL nebulizer solution 3 mL, Every 4 hours PRN    isosorbide mononitrate ER (IMDUR) 60 mg, oral, Daily, Do not crush or chew.    levothyroxine (SYNTHROID, LEVOXYL) 25 mcg, oral, Daily    nitroglycerin (NITROSTAT) 0.4 mg, sublingual, Every 5 min PRN    Refresh Optive Advanced 0.5-1-0.5 % drops 2 drops, Daily    sertraline (ZOLOFT) 50 mg, oral, Daily    spironolactone (ALDACTONE) 25 mg, oral, Daily        Review of Systems:      12 point review of systems was obtained in detail and is negative other than that detailed above.    Vital Signs:     Vitals:    01/12/25 1932 01/12/25 2355 01/12/25 2359 01/13/25 0719   BP: 95/50 85/52 113/65 119/69   BP Location: Left arm Left arm Right arm Right arm   Patient Position: Lying Lying Lying Sitting   Pulse: 67 62  71   Resp: 16 16  17   Temp: 36.4 °C (97.5 °F) 36.2 °C (97.2 °F)  36.3 °C (97.3 °F)   TempSrc: Temporal Temporal  Temporal   SpO2: 93% 94%  92%   Weight:       Height:         No intake or output data in the 24 hours ending 01/13/25 0842    Wt Readings from Last 4 Encounters:   01/11/25 92.1 kg (203 lb)   01/08/25 92.5 kg (203 lb 14.8 oz)   10/24/24 90.5 kg (199 lb 9.6 oz)   10/17/24 91.1 kg (200 lb 12.8 oz)       Physical Examination:     GENERAL APPEARANCE: Well developed, well nourished, in no acute distress.  CHEST: Symmetric and non-tender.  INTEGUMENT: Skin warm and dry, without gross excoriationis or lesions.  HEENT: No gross abnormalities of conjunctiva, teeth, gums, oral mucosa  NECK: Supple, no JVD,  no bruit. Thyroid not palpable. Carotid upstrokes normal.  NEURO/PSHCY: Alert and oriented x3; appropriate behavior and responses and responses, grossly normal cerebellar function with normal balance and coordination  LUNGS: Clear to auscultation bilaterally; normal respiratory effort.  HEART: Rate and rhythm regular with no evident murmur; no gallop appreciated. There are no rubs, clicks or heaves. PMI nondisplaced.  ABDOMEN: Soft, nontender, no palpable hepatosplenomegaly, no mases, no bruits. Abdominal aorta not noted to be enlarged.  MUSCULOSKELETAL: Ambulatory with normal tandem gait.  EXTREMITIES: Warm with good color, no clubbing or cyanois. There is no edema noted.  Right wrist slightly eccymotic no hematoma no bruit  PERIPHERAL VASCULAR: Pulses present and equally palpable; 2+ throughout. No femoral bruits.      Lab:     CBC:   Results from last 7 days   Lab Units 01/11/25  1926 01/10/25  0530 01/09/25  0609   WBC AUTO x10*3/uL 5.6 4.0* 4.6   RBC AUTO x10*6/uL 4.44 4.74 4.62   HEMOGLOBIN g/dL 13.3 13.9 13.8   HEMATOCRIT % 39.2 43.2 41.2   MCV fL 88 91 89   MCH pg 30.0 29.3 29.9   MCHC g/dL 33.9 32.2 33.5   RDW % 12.5 12.5 12.7   PLATELETS AUTO x10*3/uL 187 172 179     CMP:    Results from last 7 days   Lab Units 01/11/25  1926 01/10/25  0530 01/09/25  0609   SODIUM mmol/L 138 139 137   POTASSIUM mmol/L 3.8 3.8 3.6   CHLORIDE mmol/L 102 102 101   CO2 mmol/L 27 28 27   BUN mg/dL 18 18 23   CREATININE mg/dL 1.00 0.98 1.03   GLUCOSE mg/dL 141* 135* 165*   PROTEIN TOTAL g/dL 7.4 7.2 7.2   CALCIUM mg/dL 9.8 9.4 9.5   BILIRUBIN TOTAL mg/dL 0.9 0.8 1.0   ALK PHOS U/L 58 52 53   AST U/L 19 19 21   ALT U/L 17 19 20     BMP:    Results from last 7 days   Lab Units 01/11/25  1926 01/10/25  0530 01/09/25  0609   SODIUM mmol/L 138 139 137   POTASSIUM mmol/L 3.8 3.8 3.6   CHLORIDE mmol/L 102 102 101   CO2 mmol/L 27 28 27   BUN mg/dL 18 18 23   CREATININE mg/dL 1.00 0.98 1.03   CALCIUM mg/dL 9.8 9.4 9.5   GLUCOSE mg/dL  141* 135* 165*     Magnesium:  Results from last 7 days   Lab Units 01/11/25  1926 01/10/25  0530 01/09/25  0609   MAGNESIUM mg/dL 2.14 2.19 2.13     Troponin:    Results from last 7 days   Lab Units 01/11/25  2229 01/11/25  1926 01/08/25  2354   TROPHS ng/L 19* 17* 3     BNP:     Lipid Panel:  Results from last 7 days   Lab Units 01/09/25  0609   HDL mg/dL 36.9   CHOLESTEROL/HDL RATIO  3.3   VLDL mg/dL 35   TRIGLYCERIDES mg/dL 173*   NON HDL CHOL. mg/dL 84        Diagnostic Studies:   @No results found for this or any previous visit.    ECG 12 lead    Result Date: 1/12/2025  Sinus bradycardia Left bundle branch block Abnormal ECG When compared with ECG of 11-JAN-2025 22:24, (unconfirmed) No significant change was found    XR chest 1 view    Result Date: 1/11/2025  STUDY: Chest Radiograph;  1/11/2025 7:33 PM INDICATION: Chest pain. COMPARISON: None Available. ACCESSION NUMBER(S): [id number] ORDERING CLINICIAN: HUONG ARCHIBALD TECHNIQUE:  Frontal chest was obtained at 19:31 hours. FINDINGS: No acute opacities or effusions are visualized.  Heart size is within normal limits.  There is no evidence of a pneumothorax.    No acute findings on single AP view of the chest. Signed by Bal Hernandez MD    ECG 12 lead    Result Date: 1/11/2025  Normal sinus rhythm Left bundle branch block Abnormal ECG When compared with ECG of 10-MANUEL-2025 06:40, (unconfirmed) QRS duration has decreased        Radiology:     XR chest 1 view   Final Result   No acute findings on single AP view of the chest.   Signed by Bal Hernandez MD      CT head wo IV contrast    (Results Pending)       Problem List:     Patient Active Problem List   Diagnosis    Migraine    Abnormal CT of the chest    Abnormal LFTs (liver function tests)    Abnormal results of cardiovascular function studies    ABHISHEK positive    ASCUS with positive high risk HPV cervical    Asthma    Ataxic gait    Atypical neuralgia    CAD S/P percutaneous coronary angioplasty    Chest  pain    Chronic low back pain    CVA (cerebral vascular accident) (Multi)    Depression    Dizziness of unknown cause    Fatigue    GERD (gastroesophageal reflux disease)    Hemorrhoids    Hiatal hernia    Hypertension    Hypothyroidism    Lupus    Mixed hyperlipidemia    Nocturia    Osteoarthritis    Osteoarthritis of lumbar spine    Rectal bleeding    Rheumatoid arthritis    Tremors of nervous system    Type 2 diabetes mellitus with hyperglycemia (Multi)    Urinary frequency    Vitamin B deficiency    Vitamin D deficiency    Yeast dermatitis    Lower urinary tract infectious disease    Small vessel disease, cerebrovascular    Fibromyalgia    Acute sinusitis    Bronchitis, acute    Headache    URI, acute    Left-sided epistaxis    Postmenopausal bleeding    Skin rash    Breast lump    Bilateral otitis media with effusion    Class 2 obesity with body mass index (BMI) of 37.0 to 37.9 in adult    Medicare annual wellness visit, subsequent    BMI 36.0-36.9,adult    Former smoker    Lymphadenopathy    Angular cheilitis with candidiasis    Sinus congestion    Chest pain, unspecified    Angina pectoris, unstable (Multi)    Acute chest pain       Assessment:   Chest pain atypical  History of CAD  Hypertension  Diabetes  Dyslipidemia  EF 45 to 50%  Headache    Plan:   Tele monitoring  Hold imdur due to headache  Asprin 81 mg daily  Toprol xl 25 mg daily  Lipitor 40 mg daily  Norvasc 5 mg daily  CT head r/o bleed  Keep magnesium great 2.0  Further recommendations per Dr Toribio Mills CNP  [facility]      Of note, this documentation is completed using the Dragon Dictation system (voice recognition software). There may be spelling and/or grammatical errors that were not corrected prior to final submission.      Electronically signed by CARMEL Spain-CNP, on 1/13/2025 at 8:42 AM    Patient seen and examined in conjunction with CARMEL Teague and agree  with the evaluation as noted above.  I have personally interviewed and examined the patient.   I have personally and independently reviewed the pertinentlabs and diagnostic testing.  I have personally verified the elements of the history and physical listed above and changes, if any, are noted below.    65-year-old lady with a history of hypertension, dyslipidemia, diabetes mellitus and CAD who had a recent cardiac catheterization about a week ago which showed 75% left circumflex disease treated with drug-eluting stent.  She now presents with recurrent chest pain in the setting of persistent cough and chest congestion associated with some headache.  She said the chest pain is appears to change with position and is worse with deep breath.  The pain is not similar to the same pain that she had a week ago when she presented with unstable angina.  She says she has been compliant with her dual antiplatelet therapy since her discharge.  She denied any other associated symptoms such as dyspnea on exertion palpitations or diaphoresis.  Recent echocardiogram shows mild LV dysfunction with EF of 45 to 50% as noted above with no gross valvular abnormalities.  However the cardiac catheterization shows preserved ejection fraction of 55%.    Agree with examination as noted above.  Cardiac exam reveals regular first and second heart sound, no S3 or S4 heard and there were no murmurs.  Chest is clear to auscultation bilaterally.  There is no ankle edema.  EKG shows normal sinus rhythm with no acute ST or T wave changes.    ASSESSMENT AND PLAN:  1.  Chest pain: This appears to be noncardiac chest pain, with a clear pleuritic component as well as reproducible chest wall tenderness.  Her serial troponins has been negative and there are no new ischemic EKG changes.  Patient seems to have some relief with her current analgesics.  Patient can therefore be discharged from cardiology perspective for outpatient follow-up as scheduled.  2.   CAD: S/p left circumflex stent recently as noted above with no new ischemic EKG changes.  Will continue with current dual antiplatelet therapy and follow-up as scheduled.  AKA

## 2025-01-13 NOTE — DISCHARGE SUMMARY
Discharge Diagnosis  Acute chest pain    Issues Requiring Follow-Up       Test Results Pending At Discharge  Pending Labs       No current pending labs.            Hospital Course   Patient had a recent admission.  Underwent PCI stent placement.  Came back to the emergency department with a complaint of chest pain.  But also had cough pleuritic chest pain.  Viral symptoms.  Was treated symptomatically.  Patient was admitted.  Telemetry.  Seen by cardiology.  He complained of a headache.  Imdur was stopped.  Headache improved.  CT scan showed no acute.  At this point patient stable for discharge home from a general medical standpoint.    Pertinent Physical Exam At Time of Discharge  Physical Exam    Home Medications     Medication List      CONTINUE taking these medications     albuterol 90 mcg/actuation inhaler   amLODIPine 5 mg tablet; Commonly known as: Norvasc; Take 1 tablet (5 mg)   by mouth once daily.   aspirin 81 mg EC tablet   atorvastatin 40 mg tablet; Commonly known as: Lipitor; Take 1 tablet (40   mg) by mouth once daily.   cholecalciferol 25 MCG (1000 UT) tablet; Commonly known as: Vitamin D-3   clopidogrel 75 mg tablet; Commonly known as: Plavix; Take 1 tablet (75   mg) by mouth once daily.   clotrimazole-betamethasone cream; Commonly known as: Lotrisone; Apply   sparingly to corners of mouth twice a day for up to 2 weeks.  Avoid   ingestion.   cyanocobalamin 1,000 mcg tablet; Commonly known as: Vitamin B-12   glipiZIDE XL 5 mg 24 hr tablet; Commonly known as: Glucotrol XL; Take 1   tablet (5 mg) by mouth once daily. Do not crush, chew, or split.   hydroCHLOROthiazide 12.5 mg tablet; Commonly known as: Microzide; Take 1   tablet (12.5 mg) by mouth once daily.   ipratropium-albuteroL 0.5-2.5 mg/3 mL nebulizer solution; Commonly known   as: Duo-Neb   levothyroxine 25 mcg tablet; Commonly known as: Synthroid, Levoxyl; Take   1 tablet (25 mcg) by mouth once daily.   nitroglycerin 0.4 mg SL tablet; Commonly  known as: Nitrostat; Place 1   tablet (0.4 mg) under the tongue every 5 minutes if needed for chest pain.   Refresh Optive Advanced 0.5-1-0.5 % drops; Generic drug:   [id number]   sertraline 50 mg tablet; Commonly known as: Zoloft; Take 1 tablet (50   mg) by mouth once daily.   spironolactone 25 mg tablet; Commonly known as: Aldactone; Take 1 tablet   (25 mg) by mouth once daily.   TylenoL 325 mg tablet; Generic drug: acetaminophen     STOP taking these medications     isosorbide mononitrate ER 60 mg 24 hr tablet; Commonly known as: Imdur       Outpatient Follow-Up  Future Appointments   Date Time Provider Department Center   1/22/2025 10:30 AM Victoria Shelton MD [id number] [place]   1/23/2025 10:45 AM Tae Blanchard MD [id number] [place]       Chito Noyola MD

## 2025-01-14 ENCOUNTER — PATIENT OUTREACH (OUTPATIENT)
Dept: PRIMARY CARE | Facility: CLINIC | Age: 65
End: 2025-01-14
Payer: COMMERCIAL

## 2025-01-14 NOTE — PROGRESS NOTES
Discharge Facility:  [facility]  Discharge Diagnosis:  Acute CP  Admission Date: 1/12/25  Discharge Date: 1/13/25    PCP Appointment Date:  JAN 22 2025 10:30 AM - Primary Care Hospital Discharge  [facility] - Victoria Shelton MD     Specialist Appointment Date:   JAN 23 2025 10:45 AM - Cardiology Clinic Visit  [facility] - Tae Blanchard MD     Hospital Encounter and Summary Linked: Yes  See discharge assessment below for further details     Engagement  Call Start Time: 1347 (1/14/2025  1:52 PM)    Medications  Medications reviewed with patient/caregiver?: Yes (1/14/2025  1:52 PM)  Is the patient having any side effects they believe may be caused by any medication additions or changes?: No (1/14/2025  1:52 PM)  Does the patient have all medications ordered at discharge?: Not applicable (1/14/2025  1:52 PM)  Prescription Comments: stop Imdur (1/14/2025  1:52 PM)  Is the patient taking all medications as directed (includes completed medication regime)?: Not applicable (1/14/2025  1:52 PM)    Self Management  What is the home health agency?: n/a (1/14/2025  1:52 PM)  What Durable Medical Equipment (DME) was ordered?: n/a (1/14/2025  1:52 PM)    Patient Teaching  Does the patient have access to their discharge instructions?: Yes (1/14/2025  1:52 PM)  Care Management Interventions: Reviewed instructions with patient (1/14/2025  1:52 PM)  What is the patient's perception of their health status since discharge?: Improving (1/14/2025  1:52 PM)  Is the patient/caregiver able to teach back the hierarchy of who to call/visit for symptoms/problems? PCP, Specialist, Home Health nurse, Urgent Care, ED, 911: Yes (1/14/2025  1:52 PM)    Wrap Up  Wrap Up Additional Comments: Patient denies any headache or CP. Patient states her viral symptoms have improved as well. Patient has stopped her Imdur and states she is feeling better. Reviewed upcoming appt. This CM gives contact information for nonurgent  matters (1/14/2025  1:52 PM)

## 2025-01-15 DIAGNOSIS — Z98.61 CAD S/P PERCUTANEOUS CORONARY ANGIOPLASTY: ICD-10-CM

## 2025-01-15 DIAGNOSIS — I25.10 CAD S/P PERCUTANEOUS CORONARY ANGIOPLASTY: ICD-10-CM

## 2025-01-15 RX ORDER — NITROGLYCERIN 0.4 MG/1
0.4 TABLET SUBLINGUAL EVERY 5 MIN PRN
Qty: 90 TABLET | Refills: 0 | Status: SHIPPED | OUTPATIENT
Start: 2025-01-15

## 2025-01-18 LAB
ATRIAL RATE: 89 BPM
P AXIS: 73 DEGREES
P OFFSET: 175 MS
P ONSET: 142 MS
PR INTERVAL: 178 MS
Q ONSET: 231 MS
QRS COUNT: 15 BEATS
QRS DURATION: 158 MS
QT INTERVAL: 414 MS
QTC CALCULATION(BAZETT): 503 MS
QTC FREDERICIA: 471 MS
R AXIS: -30 DEGREES
T AXIS: 107 DEGREES
T OFFSET: 438 MS
VENTRICULAR RATE: 89 BPM

## 2025-01-19 LAB
ATRIAL RATE: 81 BPM
P AXIS: 77 DEGREES
P OFFSET: 151 MS
P ONSET: 115 MS
PR INTERVAL: 182 MS
Q ONSET: 206 MS
QRS COUNT: 14 BEATS
QRS DURATION: 170 MS
QT INTERVAL: 444 MS
QTC CALCULATION(BAZETT): 515 MS
QTC FREDERICIA: 490 MS
R AXIS: -35 DEGREES
T AXIS: 90 DEGREES
T OFFSET: 428 MS
VENTRICULAR RATE: 81 BPM

## 2025-01-21 DIAGNOSIS — E11.65 TYPE 2 DIABETES MELLITUS WITH HYPERGLYCEMIA, UNSPECIFIED WHETHER LONG TERM INSULIN USE (MULTI): ICD-10-CM

## 2025-01-21 DIAGNOSIS — I63.9 CEREBROVASCULAR ACCIDENT (CVA), UNSPECIFIED MECHANISM (MULTI): ICD-10-CM

## 2025-01-21 DIAGNOSIS — J45.909 ASTHMA, UNSPECIFIED ASTHMA SEVERITY, UNSPECIFIED WHETHER COMPLICATED, UNSPECIFIED WHETHER PERSISTENT (HHS-HCC): Primary | ICD-10-CM

## 2025-01-21 NOTE — PROGRESS NOTES
I reviewed the progress note and agree with the resident’s findings and plans as written. Case discussed with resident.    Pauline Montenegro, PharmD

## 2025-01-22 ENCOUNTER — APPOINTMENT (OUTPATIENT)
Dept: PRIMARY CARE | Facility: CLINIC | Age: 65
End: 2025-01-22
Payer: COMMERCIAL

## 2025-01-23 ENCOUNTER — APPOINTMENT (OUTPATIENT)
Dept: CARDIOLOGY | Facility: CLINIC | Age: 65
End: 2025-01-23
Payer: COMMERCIAL

## 2025-01-23 VITALS
WEIGHT: 198.8 LBS | HEIGHT: 62 IN | BODY MASS INDEX: 36.58 KG/M2 | HEART RATE: 82 BPM | SYSTOLIC BLOOD PRESSURE: 112 MMHG | DIASTOLIC BLOOD PRESSURE: 80 MMHG

## 2025-01-23 DIAGNOSIS — E78.2 MIXED HYPERLIPIDEMIA: ICD-10-CM

## 2025-01-23 DIAGNOSIS — K21.9 GASTROESOPHAGEAL REFLUX DISEASE, UNSPECIFIED WHETHER ESOPHAGITIS PRESENT: ICD-10-CM

## 2025-01-23 DIAGNOSIS — I63.9 CEREBROVASCULAR ACCIDENT (CVA), UNSPECIFIED MECHANISM (MULTI): ICD-10-CM

## 2025-01-23 DIAGNOSIS — I10 PRIMARY HYPERTENSION: ICD-10-CM

## 2025-01-23 DIAGNOSIS — Z98.61 CAD S/P PERCUTANEOUS CORONARY ANGIOPLASTY: ICD-10-CM

## 2025-01-23 DIAGNOSIS — R59.1 LYMPHADENOPATHY: ICD-10-CM

## 2025-01-23 DIAGNOSIS — I25.10 CAD S/P PERCUTANEOUS CORONARY ANGIOPLASTY: ICD-10-CM

## 2025-01-23 DIAGNOSIS — E11.65 TYPE 2 DIABETES MELLITUS WITH HYPERGLYCEMIA, UNSPECIFIED WHETHER LONG TERM INSULIN USE (MULTI): ICD-10-CM

## 2025-01-23 DIAGNOSIS — Z87.891 FORMER SMOKER: ICD-10-CM

## 2025-01-23 PROBLEM — E66.812 CLASS 2 OBESITY WITH BODY MASS INDEX (BMI) OF 37.0 TO 37.9 IN ADULT: Status: RESOLVED | Noted: 2023-10-20 | Resolved: 2025-01-23

## 2025-01-23 PROCEDURE — 3079F DIAST BP 80-89 MM HG: CPT | Performed by: INTERNAL MEDICINE

## 2025-01-23 PROCEDURE — 3074F SYST BP LT 130 MM HG: CPT | Performed by: INTERNAL MEDICINE

## 2025-01-23 PROCEDURE — 3048F LDL-C <100 MG/DL: CPT | Performed by: INTERNAL MEDICINE

## 2025-01-23 PROCEDURE — 1159F MED LIST DOCD IN RCRD: CPT | Performed by: INTERNAL MEDICINE

## 2025-01-23 PROCEDURE — 3051F HG A1C>EQUAL 7.0%<8.0%: CPT | Performed by: INTERNAL MEDICINE

## 2025-01-23 PROCEDURE — 1036F TOBACCO NON-USER: CPT | Performed by: INTERNAL MEDICINE

## 2025-01-23 PROCEDURE — 99495 TRANSJ CARE MGMT MOD F2F 14D: CPT | Performed by: INTERNAL MEDICINE

## 2025-01-23 PROCEDURE — 3008F BODY MASS INDEX DOCD: CPT | Performed by: INTERNAL MEDICINE

## 2025-01-23 NOTE — PATIENT INSTRUCTIONS
Patient to follow up in 6 months with Dr. Tae Blanchard MD [facility]     No changes today.   Continue same medications and treatments.   Patient educated on proper medication use.   Patient educated on risk factor modification.   Please bring any lab results from other providers / physicians to your next appointment.     Please bring all medicines, vitamins, and herbal supplements with you when you come to the office.     Prescriptions will not be filled unless you are compliant with your follow up appointments or have a follow up appointment scheduled as per instruction of your physician. Refills should be requested at the time of your visit.    Vasile WAKEFIELD RN am scribing for and in the presence of Dr. Tae Blanchard MD [facility]

## 2025-01-23 NOTE — PROGRESS NOTES
Referred by Dr. Hassan ref. provider found provider found for   Chief Complaint   Patient presents with    Hospital Follow-up     1 week TCM Discharged on 2025 due to acute chest pain        History of Present Illness  Lalita Dumont is a 65 y.o. year old female patient is here for follow-up following cardiac catheterization and stent of the circumflex.  She did have side effects from isosorbide..  Is feeling fine now with no complaint no symptoms of chest pain.  Discussed with the patient Kneifl continue dual antiplatelet therapy.  Will call for any problem follow-up as scheduled    Past Medical History  Past Medical History:   Diagnosis Date    Allergic     Arthritis     Asthma     CHF (congestive heart failure)     Coronary artery disease     Diabetes mellitus (Multi)     Disease of thyroid gland     Headache     Heart disease     Hypertension     Personal history of other diseases of the circulatory system     History of hypertension    Personal history of other diseases of the musculoskeletal system and connective tissue     History of rheumatoid arthritis    Personal history of other diseases of the respiratory system     History of asthma    Sleep apnea     Stroke (Multi)     Vitamin D deficiency, unspecified     Vitamin D deficiency       Social History  Social History     Tobacco Use    Smoking status: Former     Current packs/day: 0.00     Types: Cigarettes     Quit date: 10/1/1989     Years since quittin.3    Smokeless tobacco: Never    Tobacco comments:     No longer an interest   Vaping Use    Vaping status: Never Used   Substance Use Topics    Alcohol use: Not Currently     Comment: Quit    Drug use: Never       Family History     Family History   Problem Relation Name Age of Onset    Cancer Mother Stephanie     Other (acute myocardial infarction) Father Kal     Heart disease Father Kal     Cancer Sister Teresa     Cancer Brother Kal     Diabetes Brother Kal     Diabetes Brother  Spoke with mom and reviewed report from [employer]. Mom and dad feel that pt's head looks much better after first helmet. Another helmet will cost $2000+. As parents feel the head shape is significantly improved and are not concerned, ok to defer 2nd helmet for now. Will keep upcoming appt with craniofacial clinic for further assessment.     Phone if any concerns.     Jay     Diabetes Brother Mumtaz     Mental illness Brother Mumtaz     Diabetes Brother Jay     Diabetes Brother Mumtaz     Mental illness Brother Mumtaz     Diabetes Father's Sister Fiorella     Diabetes Father's Sister Fiorella     Cancer Maternal Grandmother      Cancer Maternal Grandfather Juan Luis     Cancer Paternal Grandmother Amanda     Cancer Paternal Grandfather Que     Diabetes Paternal Grandfather Que     Diabetes Other paternal aunt        Review of Systems  As per HPI, all other systems reviewed and negative.    Allergies:  Allergies   Allergen Reactions    Bactrim [Sulfamethoxazole-Trimethoprim] GI Upset    Codeine Swelling    Darvocet A500 [Propoxyphene N-Acetaminophen] Hives    Erythromycin Itching and Swelling    Lisinopril Swelling    Omeprazole Hives    Penicillins Swelling    Azithromycin Rash        Outpatient Medications:  Current Outpatient Medications   Medication Instructions    acetaminophen (TYLENOL) 650 mg, Every 4 hours PRN    albuterol 90 mcg/actuation inhaler 2 puffs, Every 6 hours PRN    amLODIPine (NORVASC) 5 mg, oral, Daily    aspirin 81 mg EC tablet 1 tablet, Daily    atorvastatin (LIPITOR) 40 mg, oral, Daily    cholecalciferol (VITAMIN D-3) 2,000 Units, 2 times daily    clopidogrel (PLAVIX) 75 mg, oral, Daily    clotrimazole-betamethasone (Lotrisone) cream Apply sparingly to corners of mouth twice a day for up to 2 weeks.  Avoid ingestion.    cyanocobalamin (Vitamin B-12) 1,000 mcg tablet 1 tablet, Daily    glipiZIDE XL (GLUCOTROL XL) 5 mg, oral, Daily, Do not crush, chew, or split.    hydroCHLOROthiazide (MICROZIDE) 12.5 mg, oral, Daily    ipratropium-albuteroL (Duo-Neb) 0.5-2.5 mg/3 mL nebulizer solution 3 mL, Every 4 hours PRN    levothyroxine (SYNTHROID, LEVOXYL) 25 mcg, oral, Daily    nitroglycerin (NITROSTAT) 0.4 mg, sublingual, Every 5 min PRN    Refresh Optive Advanced 0.5-1-0.5 % drops 2 drops, Daily    sertraline (ZOLOFT) 50 mg, oral, Daily    spironolactone  (ALDACTONE) 25 mg, oral, Daily         Vitals:  Vitals:    01/23/25 1059   BP: 112/80   Pulse: 82       Physical Exam:  Physical Exam  Vitals and nursing note reviewed.   Constitutional:       Appearance: Normal appearance. She is normal weight.   HENT:      Head: Normocephalic and atraumatic.   Eyes:      Extraocular Movements: Extraocular movements intact.      Pupils: Pupils are equal, round, and reactive to light.   Cardiovascular:      Rate and Rhythm: Normal rate and regular rhythm.      Pulses: Normal pulses.   Pulmonary:      Effort: Pulmonary effort is normal.      Breath sounds: Normal breath sounds.   Musculoskeletal:      Cervical back: Normal range of motion.      Right lower leg: No edema.      Left lower leg: No edema.   Skin:     General: Skin is warm and dry.   Neurological:      General: No focal deficit present.      Mental Status: She is alert and oriented to person, place, and time.             Assessment/Plan   Diagnoses and all orders for this visit:  CAD S/P percutaneous coronary angioplasty  Primary hypertension  Mixed hyperlipidemia  Type 2 diabetes mellitus with hyperglycemia, unspecified whether long term insulin use (Multi)  BMI 36.0-36.9,adult  Gastroesophageal reflux disease, unspecified whether esophagitis present  Former smoker  Lymphadenopathy  Cerebrovascular accident (CVA), unspecified mechanism (Multi)          Tae Blanchard MD [facility]  Interventional Cardiology   of [facility]     Thank you for allowing me to participate in the care of this patient. Please do not hesitate to contact me with any further questions or concerns.

## 2025-01-25 LAB
ATRIAL RATE: 68 BPM
P AXIS: 57 DEGREES
P OFFSET: 149 MS
P ONSET: 107 MS
PR INTERVAL: 188 MS
Q ONSET: 201 MS
QRS COUNT: 11 BEATS
QRS DURATION: 142 MS
QT INTERVAL: 432 MS
QTC CALCULATION(BAZETT): 459 MS
QTC FREDERICIA: 450 MS
R AXIS: 27 DEGREES
T AXIS: 107 DEGREES
T OFFSET: 417 MS
VENTRICULAR RATE: 68 BPM

## 2025-01-26 LAB
ATRIAL RATE: 58 BPM
P AXIS: 65 DEGREES
P OFFSET: 151 MS
P ONSET: 105 MS
PR INTERVAL: 196 MS
Q ONSET: 203 MS
QRS COUNT: 9 BEATS
QRS DURATION: 156 MS
QT INTERVAL: 484 MS
QTC CALCULATION(BAZETT): 475 MS
QTC FREDERICIA: 478 MS
R AXIS: -14 DEGREES
T AXIS: 101 DEGREES
T OFFSET: 445 MS
VENTRICULAR RATE: 58 BPM

## 2025-01-29 ENCOUNTER — PATIENT OUTREACH (OUTPATIENT)
Dept: CARDIOLOGY | Facility: CLINIC | Age: 65
End: 2025-01-29
Payer: COMMERCIAL

## 2025-01-31 NOTE — PROGRESS NOTES
Subjective   Chief complaint: Lalita Dumont is a 65 y.o. female who presents for Hospital Follow-up (TCM- Patient is here today for a hospital discharge follow up/Dx: Acute Chest Pain) and Follow-up (Patient is here today for a 3 month follow up).    HPI:  Here for Transitional Care Management.    Was at the hospital from January 11, 2025 to January 13, 2025 for acute chest pain.  Had had a recent PCI and returned to the ED with the chest pain complaint.  Also had a cough and her chest pain was more pleuritic.  Had a headache, and Imdur was discontinued with improvement of her headache at the time.  Still present, but improved.  Has seen Dr. Blanchard.  She received another stent.  Reports no chest pains nor shortness of breath.    Would like her ears checked.  Left ear feels slight discomfort for the last 4 days.    Was told she had something going on with her esophagus that makes her choke easily.  She cannot recall what she was told.  Has not had a swallowing evaluation.    Reports she will need medication refills.  No concerns with her medicine.            Objective   /78 (BP Location: Right arm, Patient Position: Sitting, BP Cuff Size: Large adult)   Pulse 90   Temp 36.3 °C (97.4 °F) (Temporal)   Wt 90.8 kg (200 lb 3.2 oz)   SpO2 93% Comment: RA  BMI 36.62 kg/m²   Physical Exam  General:  Alert, oriented, no acute distress  Eyes:  Sclerae white, PER, conjunctivae clear  ENT:  No nasal congestion.  Bilateral TMs are gray, dull.  Mildly distorted light reflex.  Neck: Supple  Endocrine: Hemoglobin A1c is 7.3%.  Respiratory:  Normal breath sounds.  No wheezing, rhonchi nor crackles.  No dyspnea.  Cardiovascular:  S1 and S2 positive.  Regular rate and rhythm.  No gallops.  No murmurs.  Vascular:  No edema.  Skin warm and dry.  CNS:  No gross neurological deficits.  Gait within normal limits.    Psychiatric:  Affect is positive and appropriate.  No depression.  No anxiety.    Review of Systems   I have  reviewed and reconciled the medication list with the patient today.   Current Outpatient Medications:     acetaminophen (TylenoL) 325 mg tablet, Take 2 tablets (650 mg) by mouth every 4 hours if needed., Disp: , Rfl:     amLODIPine (Norvasc) 5 mg tablet, Take 1 tablet (5 mg) by mouth once daily., Disp: 90 tablet, Rfl: 3    aspirin 81 mg EC tablet, Take 1 tablet (81 mg) by mouth once daily., Disp: , Rfl:     atorvastatin (Lipitor) 40 mg tablet, Take 1 tablet (40 mg) by mouth once daily., Disp: 90 tablet, Rfl: 3    cholecalciferol (Vitamin D-3) 25 MCG (1000 UT) tablet, Take 2 tablets (2,000 Units) by mouth 2 times a day., Disp: , Rfl:     clopidogrel (Plavix) 75 mg tablet, Take 1 tablet (75 mg) by mouth once daily., Disp: 90 tablet, Rfl: 1    clotrimazole-betamethasone (Lotrisone) cream, Apply sparingly to corners of mouth twice a day for up to 2 weeks.  Avoid ingestion. (Patient taking differently: Apply sparingly to corners of mouth twice a day for up to 2 weeks.  Avoid ingestion. prn), Disp: 15 g, Rfl: 0    cyanocobalamin (Vitamin B-12) 1,000 mcg tablet, Take 1 tablet (1,000 mcg) by mouth once daily., Disp: , Rfl:     ipratropium-albuteroL (Duo-Neb) 0.5-2.5 mg/3 mL nebulizer solution, Take 3 mL by nebulization every 4 hours if needed for wheezing., Disp: , Rfl:     nitroglycerin (Nitrostat) 0.4 mg SL tablet, Place 1 tablet (0.4 mg) under the tongue every 5 minutes if needed for chest pain., Disp: 90 tablet, Rfl: 0    Refresh Optive Advanced 0.5-1-0.5 % drops, Administer 2 drops into both eyes once daily., Disp: , Rfl:     sertraline (Zoloft) 50 mg tablet, Take 1 tablet (50 mg) by mouth once daily., Disp: 30 tablet, Rfl: 0    albuterol 90 mcg/actuation inhaler, Inhale 2 puffs every 6 hours if needed for shortness of breath., Disp: 18 g, Rfl: 2    glipiZIDE XL (Glucotrol XL) 5 mg 24 hr tablet, Take 1 tablet (5 mg) by mouth once daily. Do not crush, chew, or split., Disp: 90 tablet, Rfl: 3    hydroCHLOROthiazide  (Microzide) 12.5 mg tablet, Take 1 tablet (12.5 mg) by mouth once daily., Disp: 90 tablet, Rfl: 1    levothyroxine (Synthroid, Levoxyl) 25 mcg tablet, Take 1 tablet (25 mcg) by mouth once daily., Disp: 90 tablet, Rfl: 1    spironolactone (Aldactone) 25 mg tablet, Take 1 tablet (25 mg) by mouth once daily., Disp: 90 tablet, Rfl: 1     Imaging:  ECG 12 lead    Result Date: 1/26/2025  Sinus bradycardia Left bundle branch block Abnormal ECG When compared with ECG of 11-JAN-2025 22:24, (unconfirmed) No significant change was found See ED provider note for full interpretation and clinical correlation Confirmed by Hortencia Mcclelland ([id number]) on 1/26/2025 10:26:47 AM    ECG 12 lead    Result Date: 1/25/2025  Normal sinus rhythm Left bundle branch block Abnormal ECG When compared with ECG of 10-MANUEL-2025 06:40, (unconfirmed) QRS duration has decreased See ED provider note for full interpretation and clinical correlation Confirmed by Hortencia Mcclelland ([id number]) on 1/25/2025 11:30:56 AM    ECG 12 lead    Result Date: 1/19/2025  Normal sinus rhythm Left axis deviation Left bundle branch block Abnormal ECG When compared with ECG of 08-JAN-2025 17:02, (unconfirmed) No significant change was found See ED provider note for full interpretation and clinical correlation Confirmed by Margarita Fernandez ([id number]) on 1/19/2025 2:12:51 PM    ECG 12 lead    Result Date: 1/18/2025  Normal sinus rhythm Left axis deviation Left bundle branch block Abnormal ECG When compared with ECG of 09-OCT-2023 21:17, Previous ECG has undetermined rhythm, needs review See ED provider note for full interpretation and clinical correlation Confirmed by Margarita Fernandez ([id number]) on 1/18/2025 9:33:14 PM    CT head wo IV contrast    Result Date: 1/13/2025  Interpreted By:  Mumtaz Givens, STUDY: CT HEAD WO IV CONTRAST;  1/13/2025 9:24 am   INDICATION: Signs/Symptoms:severe headache s/p heart cath.   COMPARISON: 06/02/2024   ACCESSION NUMBER(S): [id number]   ORDERING  CLINICIAN: ROMAN PRUITT   TECHNIQUE: Axial CT images of the head were obtained without intravenous contrast administration.   FINDINGS: There is similar mild-to-moderate brain parenchymal volume loss when compared with the prior study dated 06/02/2024.   Scattered nonspecific white matter changes are again noted within cerebral hemispheres bilaterally which while nonspecific, given the patient's age, likely represent sequelae of small-vessel ischemic change.   There is a stable small 4 mm nodular focus of hyperdensity along the left lateral margin of the 4th ventricle which may represent incidental partially calcified choroid plexus.   No hyperdense acute intracranial hemorrhage is noted.   There is no midline shift.   Incidental note is again made of a component of hyperostosis frontalis interna.   The visualized paranasal sinuses and mastoid air cells are clear.   No acute fracture is noted.       There is similar mild-to-moderate brain parenchymal volume loss when compared with the prior study dated 06/02/2024.   Scattered nonspecific white matter changes are again noted within cerebral hemispheres bilaterally which while nonspecific, given the patient's age, likely represent sequelae of small-vessel ischemic change.   MACRO: None.   Signed by: Mumtaz Givens 1/13/2025 9:35 AM Dictation workstation:   [id number]    ECG 12 Lead    Result Date: 1/12/2025  Sinus bradycardia Left bundle branch block Abnormal ECG Confirmed by Korey Barragan ([id number]) on 1/12/2025 10:06:31 AM    Electrocardiogram 12 Lead    Result Date: 1/12/2025  Sinus bradycardia Left bundle branch block Abnormal ECG Confirmed by Korey Barragan ([id number]) on 1/12/2025 9:30:34 AM    ECG 12 Lead    Result Date: 1/12/2025  Sinus bradycardia with 1st degree AV block Left bundle branch block Abnormal ECG Confirmed by Korey Barragan ([id number]) on 1/12/2025 9:30:07 AM    XR chest 1 view    Result Date: 1/11/2025  STUDY: Chest Radiograph;  1/11/2025  7:33 PM INDICATION: Chest pain. COMPARISON: None Available. ACCESSION NUMBER(S): [id number] ORDERING CLINICIAN: HUONG ARCHIBALD TECHNIQUE:  Frontal chest was obtained at 19:31 hours. FINDINGS: No acute opacities or effusions are visualized.  Heart size is within normal limits.  There is no evidence of a pneumothorax.    No acute findings on single AP view of the chest. Signed by Bal Hernandez MD    Cardiac Catheterization Procedure    Result Date: 1/9/2025   [facility], Cath Lab        [address] [place] [address] Cardiovascular Catheterization Report Patient Name:     HALEIGH CAPELLAN     Performing Physician:  Ange Blanchard MD Study Date:       1/9/2025           Verifying Physician:   Ange Blanchard MD MRN/PID:          [id number]           Cardiologist/Co-Scrub: Accession#:       [id number]       Ordering Provider:     [id number] ROMAN PRUITT Date of           1960 / 65      Cardiologist: Birth/Age:        years Gender:           F                  Fellow: Encounter#:       [id number]         Surgeon:  Study:            Left Heart Cath Additional Study: Coronary Arteriogram Additional Study: Left Ventriculogram Additional Study: PCI - Percutaneous Coronary Intervention  Indications: HALEIGH CAPELLAN is a 65 year old female who presents with dyslipidemia, hypertension and a chest pain assessment of typical angina. Worsening angina. Stress test performed: No. CTA performed: No. Vivi accessed: No. LVEF Assessed: No. Cardiac arrest: No. Cardiac surgical consult: No. Cardiovascular Instability: No Frailty status of patient entering lab: 4 = Vulnerable.  Coronary Angiography: The coronary circulation is co-dominant.  Left Main Coronary Artery: The left main coronary artery is free of atherosclerotic disease.  Left Anterior Descending Coronary Artery Distribution: The Left Anterior Descending artery is a large vessel.  Presents luminal irregularities and contains patent previously placed stents.  Circumflex Coronary Artery Distribution: The Left Circumflex artery is a large vessel. Hemodynamically significant obstruction is noted in this vessel. There is 75% stenosis in the the distal Circumflex artery. The devices advanced to the the distal CX lesion were:Resolute Eduardo 2.75x18 stent was deployed in the lesion a balloon was inflated for post-dilation. Residual stenosis is 0%.  Right Coronary Artery Distribution: The Right Coronary Artery is a medium-sized vessel. Presents luminal irregularities and contains patent previously placed stents. There is 40% stenosis in the the mid Right Coronary Artery.  Left Ventriculography: The estimated left ventricular ejection fraction is normal at 55%.  Coronary Interventions: Angiography reveals a 75% stenosis of the distal circumflex coronary artery. Pre-intervention MAURICE flow was 3. Percutaneous coronary intervention was performed within the distal circumflex. Boone Eduardo drug-eluting stent 2.75 mm x 18 mm was advanced to the lesion and implanted at 12 LEATHA. The stent was post dilated using a non-compliant balloon 2.75 mm x 15 mm at 20 LEATHA. The stenosis was successfully reduced from 75% to 0%. Post-intervention MAURICE flow was 3.  Hemo Personnel: +-------------------------+------------+ Name                     Duty         +-------------------------+------------+ Tae Blanchard MD             PROC MD 1 +-------------------------+------------+ Erin Orta RN          PROC SCRUB 1 +-------------------------+------------+ Michelle Bowen RN        PROC CIRC 1 +-------------------------+------------+ Deya Pal RNPELLE NURSE 1 +-------------------------+------------+  Hemodynamic Pressures:  +----+--------------------+----------+-------------+--------------+---------+ Site     Date Time      Phase NameSystolic mmHgDiastolic mmHgMean mmHg  +----+--------------------+----------+-------------+--------------+---------+   AO1/9/2025 12:49:07 PM  AIR REST            9             0        8 +----+--------------------+----------+-------------+--------------+---------+   AO 1/9/2025 1:06:24 PM  AIR REST           91            49       68 +----+--------------------+----------+-------------+--------------+---------+  Cardiac Cath Post Procedure Notes: Post Procedure Diagnosis: RASHARD of Circumflex. Blood Loss:               Estimated blood loss during the procedure was 2 mls. Specimens Removed:        Number of specimen(s) removed: none. ____________________________________________________________________________________ CONCLUSIONS:  1. Left Main Coronary Artery: This artery is normal.  2. Left Anterior Descending Artery: presents luminal irregularities and contains patent previously placed stents.  3. Circumflex Coronary Artery: significantly obstructed.  4. Distal CX Lesion: The percent stenosis is 75%.  5. Distal CX Lesion: Resolute Loris 2.75x18 post-dilation: 0% residual stenosis. distal CX: pre-procedure MAURICE flow was 3(complete perfusion) and post-procedure MAURICE flow was 3(complete perfusion).  6. Right Coronary Artery: presents luminal irregularities and contains patent previously placed stents.  7. Mid RCA Lesion: The percent stenosis is 40%.  8. The Left Ventricular Ejection Fraction is 55%. ICD 10 Codes: Angina pectoris, unspecified-I20.9  CPT Codes: Left Heart Cath (visualization of coronaries) and LV-57725; Stent w angioplasty Left Circumflex single major Artery branch (PCI)-88258.LC; Moderate Sedation Services 1st additional 15 minutes patient >5 years-87617; Moderate Sedation Services 2nd additional 15 minutes patient >5 years-99263  [id number] Blanchard MD Performing Physician Electronically signed by Ange Blanchard MD on 1/9/2025 at 1:34:00 PM  ** Final **     Transthoracic Echo (TTE) Complete    Result Date: 1/9/2025           [place] [address]  Tel [phone] Fax [phone] TRANSTHORACIC ECHOCARDIOGRAM REPORT Patient Name:       HALEIGH CAPELLAN      Reading Physician:    [id number] Randy Arreaga MD, [facility] Study Date:         1/9/2025            Ordering Provider:    [id number] RANDYSOCO WATERS MRN/PID:            [id number]            Fellow: Accession#:         [id number]        Nurse:                Marin Al RN Date of Birth/Age:  1960 / 65 years Sonographer:          Zofia Chang RDCS Gender Assigned at  F                   Additional Staff: Birth: Height:             160.02 cm           Admit Date:           1/8/2025 Weight:             92.08 kg            Admission Status:     Inpatient -                                                               Routine BSA / BMI:          1.95 m2 / 35.96     Department Location:  [place] [address]                                     Echo Lab Blood Pressure: 126 /66 mmHg Study Type:    TRANSTHORACIC ECHO (TTE) COMPLETE Diagnosis/ICD: Chest pain, unspecified-R07.9 Indication:    Chest Pain CPT Codes:     Echo Complete w Full Doppler-66698 Patient History: Diabetes:          Yes Pertinent History: CAD, Chest Pain, CVA, HTN, Hyperlipidemia and CHF. Study Detail: The following Echo studies were performed: 2D, M-Mode, Doppler and               color flow. Technically challenging study due to body habitus.               Definity used as a contrast agent for endocardial border               definition. Total contrast used for this procedure was 1 mL via IV               push.  PHYSICIAN INTERPRETATION: Left Ventricle: The left ventricular systolic function is mildly decreased, with a  visually estimated ejection fraction of 45-50%. There is global hypokinesis of the left ventricle with minor regional variations. The left ventricular cavity size is upper limits of normal. Left ventricular diastolic filling was indeterminate. Left Atrium: The left atrium is normal in size. Right Ventricle: The right ventricle is normal in size. There is normal right ventricular global systolic function. Right Atrium: The right atrium is normal in size. Aortic Valve: The aortic valve is trileaflet. The aortic valve dimensionless index is 0.76. There is trace aortic valve regurgitation. The peak instantaneous gradient of the aortic valve is 7 mmHg. The mean gradient of the aortic valve is 4 mmHg. Trace AI, No aortic stenosis. Mitral Valve: The mitral valve is normal in structure. There is trace mitral valve regurgitation. Trace MR. Tricuspid Valve: The tricuspid valve is structurally normal. There is trace to mild tricuspid regurgitation. The Doppler estimated RVSP is within normal limits at 9.8 mmHg. Pulmonic Valve: The pulmonic valve is structurally normal. There is no indication of pulmonic valve regurgitation. Pericardium: No pericardial effusion noted. Aorta: The aortic root is normal. In comparison to the previous echocardiogram(s): Prior echo studies unavailable for comparison at this time.  CONCLUSIONS:  1. The left ventricular systolic function is mildly decreased, with a visually estimated ejection fraction of 45-50%.  2. There is global hypokinesis of the left ventricle with minor regional variations.  3. Left ventricular diastolic filling was indeterminate.  4. There is normal right ventricular global systolic function.  5. Trace MR.  6. Right ventricular systolic pressure is within normal limits.  7. Trace AI, No aortic stenosis.  8. Normal valve function with trivial clinically insignificant AI/MR.  9. Prior echo studies unavailable for comparison at this time. QUANTITATIVE DATA SUMMARY:  2D  MEASUREMENTS:           Normal Ranges: Ao Root d:       2.30 cm   (2.0-3.7cm) IVSd:            1.00 cm   (0.6-1.1cm) LVPWd:           0.91 cm   (0.6-1.1cm) LVIDd:           4.98 cm   (3.9-5.9cm) LVIDs:           3.81 cm LV Mass Index:   87.2 g/m2 LV % FS          23.5 %  LA VOLUME:                    Normal Ranges: LA Vol A4C:        31.8 ml    (22+/-6mL/m2) LA Vol A2C:        32.7 ml LA Vol BP:         32.9 ml LA Vol Index A4C:  16.3ml/m2 LA Vol Index A2C:  16.8 ml/m2 LA Vol Index BP:   16.9 ml/m2 LA Area A4C:       13.4 cm2 LA Area A2C:       13.3 cm2 LA Major Axis A4C: 4.8 cm LA Major Axis A2C: 4.6 cm LA Volume Index:   15.9 ml/m2  RA VOLUME BY A/L METHOD:            Normal Ranges: RA Vol A4C:              26.1 ml    (8.3-19.5ml) RA Vol Index A4C:        13.4 ml/m2 RA Area A4C:             11.5 cm2 RA Major Axis A4C:       4.3 cm  AORTA MEASUREMENTS:         Normal Ranges: Asc Ao, d:          3.40 cm (2.1-3.4cm)  LV SYSTOLIC FUNCTION BY 2D PLANIMETRY (MOD):                      Normal Ranges: EF-A4C View:    44 % (>=55%) EF-A2C View:    45 % EF-Biplane:     45 % EF-Visual:      48 % LV EF Reported: 48 %  LV DIASTOLIC FUNCTION:           Normal Ranges: MV Peak E:             0.69 m/s  (0.7-1.2 m/s) MV Peak A:             0.98 m/s  (0.42-0.7 m/s) E/A Ratio:             0.70      (1.0-2.2) MV e'                  0.080 m/s (>8.0) MV lateral e'          0.09 m/s MV medial e'           0.07 m/s E/e' Ratio:            8.60      (<8.0)  MITRAL VALVE:          Normal Ranges: MV DT:        216 msec (150-240msec)  AORTIC VALVE:                     Normal Ranges: AoV Vmax:                1.31 m/s (<=1.7m/s) AoV Peak P.9 mmHg (<20mmHg) AoV Mean P.0 mmHg (1.7-11.5mmHg) LVOT Max Joe:            1.04 m/s (<=1.1m/s) AoV VTI:                 28.40 cm (18-25cm) LVOT VTI:                21.50 cm LVOT Diameter:           2.00 cm  (1.8-2.4cm) AoV Area, VTI:           2.38 cm2 (2.5-5.5cm2) AoV Area,Vmax:            2.49 cm2 (2.5-4.5cm2) AoV Dimensionless Index: 0.76  AORTIC INSUFFICIENCY: AI Vmax:       4.20 m/s AI Half-time:  521 msec AI Decel Rate: 236.00 cm/s2  RIGHT VENTRICLE: RV Basal 2.26 cm RV Mid   2.15 cm RV Major 6.9 cm TAPSE:   18.1 mm RV s'    0.10 m/s  TRICUSPID VALVE/RVSP:          Normal Ranges: Peak TR Velocity:     1.30 m/s RV Syst Pressure:     10 mmHg  (< 30mmHg) IVC Diam:             1.28 cm  PULMONIC VALVE:          Normal Ranges: PV Accel Time:  82 msec  (>120ms) PV Max Joe:     0.9 m/s  (0.6-0.9m/s) PV Max PG:      3.2 mmHg  [id number] Randy Arreaga MD, [facility] Electronically signed on 1/9/2025 at 8:56:19 AM  ** Final **     CT angio chest for pulmonary embolism    Result Date: 1/8/2025  Interpreted By:  Luis Calabrese, STUDY: CT ANGIO CHEST FOR PULMONARY EMBOLISM;  1/8/2025 7:02 pm   INDICATION: Signs/Symptoms:+ dimer.   COMPARISON: None   ACCESSION NUMBER(S): [id number]   ORDERING CLINICIAN: HIRO EVANGELISTA   TECHNIQUE: Helical data acquisition of the chest was obtained after intravenous administration of , as per PE protocol. Images were reformatted in coronal and sagittal planes. Axial and coronal maximum intensity projection (MIP) images were created and reviewed.   FINDINGS: POTENTIAL LIMITATIONS OF THE STUDY: None   HEART AND VESSELS:   There are no discrete filling defects within main pulmonary artery and its branches to suggest acute pulmonary embolism.   Main pulmonary artery and its branches are normal in caliber.   The thoracic aorta normal in course and caliber.   Coronary artery stent in place suspected. No significant coronary artery calcifications are seen. Please note, the study is not optimized for evaluation of coronary arteries.   The cardiac chambers are not enlarged.   3 mm ground-glass nodules within the minor fissure on image 124 and 129/279 similar to prior. Findings are nonspecific in etiology and may represent fissural lymph nodes   MEDIASTINUM AND RADHA, LOWER NECK AND AXILLA:    The visualized thyroid gland is within normal limits.   No evidence of thoracic lymphadenopathy by CT criteria.   Esophagus appears within normal limits as seen.   LUNGS AND AIRWAYS: The trachea and central airways are patent. No endobronchial lesion is seen.   The bilateral lungs are clear without evidence of focal consolidation, pleural effusion, or pneumothorax.     UPPER ABDOMEN: The visualized subdiaphragmatic structures demonstrate no remarkable findings.   CHEST WALL AND OSSEOUS STRUCTURES:   Chest wall is within normal limits. No acute osseous pathology.There are no suspicious osseous lesions.       1. Coronary artery stent in place. No evidence of acute pulmonary embolism. 2. Nonspecific pleural base ground-glass nodules within the right minor fissure similar to prior, stable and nonspecific in nature. Findings may represent fissural lymph node. 3. Additional detailed findings as above.     MACRO: None   Signed by: Luis Calabrese 1/8/2025 7:41 PM Dictation workstation:   [id number]    XR chest 1 view    Result Date: 1/8/2025  Interpreted By:  Sosa Lazaro, STUDY: XR CHEST 1 VIEW;  1/8/2025 5:18 pm   INDICATION: Signs/Symptoms:Chest Pain.   COMPARISON: Chest x-ray 02/02/2023   ACCESSION NUMBER(S): [id number]   ORDERING CLINICIAN: HIRO EVANGELISTA   FINDINGS:     CARDIOMEDIASTINAL SILHOUETTE: Cardiomediastinal silhouette is normal in size and configuration. Atherosclerotic calcification of the aorta.   LUNGS: No consolidation, pleural effusion or pneumothorax.   ABDOMEN: No remarkable upper abdominal findings.   BONES: Multilevel degenerative changes of the spine.       No acute cardiopulmonary process.   MACRO: None   Signed by: Sosa Lazaro 1/8/2025 5:55 PM Dictation workstation:   [id number]       Labs reviewed:    Lab Results   Component Value Date    WBC 5.6 01/11/2025    HGB 13.3 01/11/2025    HCT 39.2 01/11/2025     01/11/2025    CHOL 121 01/09/2025    TRIG 173 (H) 01/09/2025    HDL 36.9  01/09/2025    ALT 17 01/11/2025    AST 19 01/11/2025     01/11/2025    K 3.8 01/11/2025     01/11/2025    CREATININE 1.00 01/11/2025    BUN 18 01/11/2025    CO2 27 01/11/2025    TSH 5.31 (H) 01/09/2025    INR 1.1 10/09/2023    HGBA1C 7.2 (H) 01/08/2025       Assessment/Plan   Problem List Items Addressed This Visit       Asthma     Controlled.  Refill given.         Relevant Medications    albuterol 90 mcg/actuation inhaler    Bilateral otitis media with effusion     No sign of bacterial infection.  Patient voiced understanding.         Depression    Encounter for examination following treatment at hospital - Primary     Patient has been doing fine since discharge.  No chest pains or shortness of breath.  Has had follow-up with cardiology.         Encounter for medication refill     Medications reviewed.  Refills given.         Encounter to discuss test results     Reviewed testing and CT results from her hospital stay.  Esophagus appears normal.  Reviewed lab/testing results with the patient and provided patient education regarding the results.           Hypertension     Well-controlled.  Continue present management.         Relevant Medications    spironolactone (Aldactone) 25 mg tablet    hydroCHLOROthiazide (Microzide) 12.5 mg tablet    Hypothyroidism     Medication refill provided.         Relevant Medications    levothyroxine (Synthroid, Levoxyl) 25 mcg tablet    Type 2 diabetes mellitus with hyperglycemia (Multi)     Hemoglobin A1c is 7.3%.  Medication refill given.         Relevant Medications    glipiZIDE XL (Glucotrol XL) 5 mg 24 hr tablet       Continue current medications as listed  Follow up in 3 months.  Sooner if needed.

## 2025-02-03 ENCOUNTER — APPOINTMENT (OUTPATIENT)
Dept: PRIMARY CARE | Facility: CLINIC | Age: 65
End: 2025-02-03
Payer: COMMERCIAL

## 2025-02-03 VITALS
DIASTOLIC BLOOD PRESSURE: 78 MMHG | BODY MASS INDEX: 36.62 KG/M2 | WEIGHT: 200.2 LBS | TEMPERATURE: 97.4 F | HEART RATE: 90 BPM | OXYGEN SATURATION: 93 % | SYSTOLIC BLOOD PRESSURE: 102 MMHG

## 2025-02-03 DIAGNOSIS — F32.A DEPRESSION, UNSPECIFIED DEPRESSION TYPE: ICD-10-CM

## 2025-02-03 DIAGNOSIS — I10 HYPERTENSION, UNSPECIFIED TYPE: ICD-10-CM

## 2025-02-03 DIAGNOSIS — E03.9 ACQUIRED HYPOTHYROIDISM: ICD-10-CM

## 2025-02-03 DIAGNOSIS — Z71.2 ENCOUNTER TO DISCUSS TEST RESULTS: ICD-10-CM

## 2025-02-03 DIAGNOSIS — Z09 ENCOUNTER FOR EXAMINATION FOLLOWING TREATMENT AT HOSPITAL: Primary | ICD-10-CM

## 2025-02-03 DIAGNOSIS — H65.93 BILATERAL OTITIS MEDIA WITH EFFUSION: ICD-10-CM

## 2025-02-03 DIAGNOSIS — J45.20 MILD INTERMITTENT ASTHMA WITHOUT COMPLICATION (HHS-HCC): ICD-10-CM

## 2025-02-03 DIAGNOSIS — Z76.0 ENCOUNTER FOR MEDICATION REFILL: ICD-10-CM

## 2025-02-03 DIAGNOSIS — E11.65 TYPE 2 DIABETES MELLITUS WITH HYPERGLYCEMIA, WITHOUT LONG-TERM CURRENT USE OF INSULIN: ICD-10-CM

## 2025-02-03 PROCEDURE — 1160F RVW MEDS BY RX/DR IN RCRD: CPT | Performed by: FAMILY MEDICINE

## 2025-02-03 PROCEDURE — 1159F MED LIST DOCD IN RCRD: CPT | Performed by: FAMILY MEDICINE

## 2025-02-03 PROCEDURE — 99495 TRANSJ CARE MGMT MOD F2F 14D: CPT | Performed by: FAMILY MEDICINE

## 2025-02-03 PROCEDURE — 3078F DIAST BP <80 MM HG: CPT | Performed by: FAMILY MEDICINE

## 2025-02-03 PROCEDURE — 3074F SYST BP LT 130 MM HG: CPT | Performed by: FAMILY MEDICINE

## 2025-02-03 PROCEDURE — 3051F HG A1C>EQUAL 7.0%<8.0%: CPT | Performed by: FAMILY MEDICINE

## 2025-02-03 PROCEDURE — 3048F LDL-C <100 MG/DL: CPT | Performed by: FAMILY MEDICINE

## 2025-02-03 RX ORDER — ALBUTEROL SULFATE 90 UG/1
2 INHALANT RESPIRATORY (INHALATION) EVERY 6 HOURS PRN
Qty: 18 G | Refills: 2 | Status: SHIPPED | OUTPATIENT
Start: 2025-02-03

## 2025-02-03 RX ORDER — HYDROCHLOROTHIAZIDE 12.5 MG/1
12.5 TABLET ORAL DAILY
Qty: 90 TABLET | Refills: 1 | Status: SHIPPED | OUTPATIENT
Start: 2025-02-03

## 2025-02-03 RX ORDER — LEVOTHYROXINE SODIUM 25 UG/1
25 TABLET ORAL DAILY
Qty: 90 TABLET | Refills: 1 | Status: SHIPPED | OUTPATIENT
Start: 2025-02-03

## 2025-02-03 RX ORDER — SPIRONOLACTONE 25 MG/1
25 TABLET ORAL DAILY
Qty: 90 TABLET | Refills: 1 | Status: SHIPPED | OUTPATIENT
Start: 2025-02-03

## 2025-02-03 RX ORDER — GLIPIZIDE 5 MG/1
5 TABLET, FILM COATED, EXTENDED RELEASE ORAL DAILY
Qty: 90 TABLET | Refills: 3 | Status: SHIPPED | OUTPATIENT
Start: 2025-02-03 | End: 2026-02-03

## 2025-02-03 ASSESSMENT — PATIENT HEALTH QUESTIONNAIRE - PHQ9
1. LITTLE INTEREST OR PLEASURE IN DOING THINGS: NOT AT ALL
SUM OF ALL RESPONSES TO PHQ9 QUESTIONS 1 AND 2: 0
2. FEELING DOWN, DEPRESSED OR HOPELESS: NOT AT ALL

## 2025-02-04 PROBLEM — Z09 ENCOUNTER FOR EXAMINATION FOLLOWING TREATMENT AT HOSPITAL: Status: ACTIVE | Noted: 2025-02-04

## 2025-02-04 NOTE — ASSESSMENT & PLAN NOTE
Reviewed testing and CT results from her hospital stay.  Esophagus appears normal.  Reviewed lab/testing results with the patient and provided patient education regarding the results.

## 2025-02-04 NOTE — ASSESSMENT & PLAN NOTE
Patient has been doing fine since discharge.  No chest pains or shortness of breath.  Has had follow-up with cardiology.

## 2025-02-11 DIAGNOSIS — E11.65 TYPE 2 DIABETES MELLITUS WITH HYPERGLYCEMIA, WITHOUT LONG-TERM CURRENT USE OF INSULIN: ICD-10-CM

## 2025-02-11 RX ORDER — GLIPIZIDE 5 MG/1
5 TABLET, FILM COATED, EXTENDED RELEASE ORAL DAILY
Qty: 100 TABLET | Refills: 3 | OUTPATIENT
Start: 2025-02-11 | End: 2026-02-11

## 2025-02-17 ENCOUNTER — PATIENT MESSAGE (OUTPATIENT)
Dept: PRIMARY CARE | Facility: CLINIC | Age: 65
End: 2025-02-17
Payer: COMMERCIAL

## 2025-02-17 DIAGNOSIS — I67.9 SMALL VESSEL DISEASE, CEREBROVASCULAR: ICD-10-CM

## 2025-02-18 ENCOUNTER — APPOINTMENT (OUTPATIENT)
Dept: CARDIOLOGY | Facility: CLINIC | Age: 65
End: 2025-02-18
Payer: COMMERCIAL

## 2025-02-20 RX ORDER — CLOPIDOGREL BISULFATE 75 MG/1
75 TABLET ORAL DAILY
Qty: 90 TABLET | Refills: 3 | Status: SHIPPED | OUTPATIENT
Start: 2025-02-20

## 2025-02-27 ENCOUNTER — PATIENT OUTREACH (OUTPATIENT)
Dept: CARDIOLOGY | Facility: CLINIC | Age: 65
End: 2025-02-27
Payer: COMMERCIAL

## 2025-03-26 ENCOUNTER — PATIENT OUTREACH (OUTPATIENT)
Dept: CARDIOLOGY | Facility: CLINIC | Age: 65
End: 2025-03-26
Payer: COMMERCIAL

## 2025-05-05 ENCOUNTER — APPOINTMENT (OUTPATIENT)
Dept: PRIMARY CARE | Facility: CLINIC | Age: 65
End: 2025-05-05
Payer: COMMERCIAL

## 2025-05-05 VITALS
SYSTOLIC BLOOD PRESSURE: 134 MMHG | BODY MASS INDEX: 36.29 KG/M2 | DIASTOLIC BLOOD PRESSURE: 84 MMHG | TEMPERATURE: 97.5 F | WEIGHT: 198.4 LBS | HEART RATE: 86 BPM | OXYGEN SATURATION: 94 %

## 2025-05-05 DIAGNOSIS — L29.9 EAR ITCHING: ICD-10-CM

## 2025-05-05 DIAGNOSIS — E11.65 TYPE 2 DIABETES MELLITUS WITH HYPERGLYCEMIA, WITHOUT LONG-TERM CURRENT USE OF INSULIN: Primary | ICD-10-CM

## 2025-05-05 PROCEDURE — 99213 OFFICE O/P EST LOW 20 MIN: CPT | Performed by: FAMILY MEDICINE

## 2025-05-05 PROCEDURE — 3079F DIAST BP 80-89 MM HG: CPT | Performed by: FAMILY MEDICINE

## 2025-05-05 PROCEDURE — 83036 HEMOGLOBIN GLYCOSYLATED A1C: CPT | Performed by: FAMILY MEDICINE

## 2025-05-05 PROCEDURE — 3052F HG A1C>EQUAL 8.0%<EQUAL 9.0%: CPT | Performed by: FAMILY MEDICINE

## 2025-05-05 PROCEDURE — 3048F LDL-C <100 MG/DL: CPT | Performed by: FAMILY MEDICINE

## 2025-05-05 PROCEDURE — 1124F ACP DISCUSS-NO DSCNMKR DOCD: CPT | Performed by: FAMILY MEDICINE

## 2025-05-05 PROCEDURE — 3075F SYST BP GE 130 - 139MM HG: CPT | Performed by: FAMILY MEDICINE

## 2025-05-05 PROCEDURE — 1159F MED LIST DOCD IN RCRD: CPT | Performed by: FAMILY MEDICINE

## 2025-05-05 PROCEDURE — 1160F RVW MEDS BY RX/DR IN RCRD: CPT | Performed by: FAMILY MEDICINE

## 2025-05-05 RX ORDER — GLIPIZIDE 5 MG/1
10 TABLET, FILM COATED, EXTENDED RELEASE ORAL DAILY
Qty: 90 TABLET | Refills: 3 | Status: SHIPPED | OUTPATIENT
Start: 2025-05-05 | End: 2026-05-05

## 2025-05-05 NOTE — PROGRESS NOTES
Subjective   Chief complaint: Lalita Dumont is a 65 y.o. female who presents for Follow-up (Patient is here today for a 3 month follow up).    HPI:  Here for a diabetes follow up.   She understands that her A1c is elevated today.  She feels that it is because she is having more stress lately.  Her 4-year-old granddaughter fractured her elbow and had to have surgical pins placed.  Her  also has a hernia at this time.  Diet and exercise recommendations discussed.      Also her ears are bothering her.  Would like them checked.  They have gotten itchy.  Using peroxide which seems to help.          Objective   /84 (BP Location: Left arm, Patient Position: Sitting, BP Cuff Size: Large adult)   Pulse 86   Temp 36.4 °C (97.5 °F) (Temporal)   Wt 90 kg (198 lb 6.4 oz)   SpO2 94% Comment: DAINA  BMI 36.29 kg/m²   Physical Exam  General:  Alert, oriented, no acute distress  Eyes:  Sclerae white, PER, conjunctivae clear  ENT:  No nasal congestion.  TM are gray bilaterally.  Ear canals are slightly hyperemic.  No lesions noted.  Unremarkable otherwise.  Neck: Supple  Endocrine:  HgbA1c today in office is 8.3%. (last was 7.2%)  Respiratory:  Normal breath sounds.  No wheezing, rhonchi nor crackles.  No dyspnea.  Cardiovascular:  S1 and S2 positive.  Regular rate and rhythm.  No gallops.  No murmurs.  Vascular:  Trace edema bilateral ankles.  Skin warm and dry.  CNS:  No gross neurological deficits.  Gait within normal limits.    Psychiatric:  Affect is positive and appropriate.  No depression.  No anxiety.    Review of Systems   I have reviewed and reconciled the medication list with the patient today. Current Medications[1]     Imaging:  Imaging  No results found.    Cardiology, Vascular, and Other Imaging  No other imaging results found for the past 7 days       Labs reviewed:    Lab Results   Component Value Date    WBC 5.6 01/11/2025    HGB 13.3 01/11/2025    HCT 39.2 01/11/2025     01/11/2025    CHOL  121 01/09/2025    TRIG 173 (H) 01/09/2025    HDL 36.9 01/09/2025    ALT 17 01/11/2025    AST 19 01/11/2025     01/11/2025    K 3.8 01/11/2025     01/11/2025    CREATININE 1.00 01/11/2025    BUN 18 01/11/2025    CO2 27 01/11/2025    TSH 5.31 (H) 01/09/2025    INR 1.1 10/09/2023    HGBA1C 7.2 (H) 01/08/2025       Assessment/Plan   Problem List Items Addressed This Visit       Ear itching    Type 2 diabetes mellitus with hyperglycemia (Multi) - Primary    Hemoglobin A1c in the office today is 8.3%.  Last A1c was 7.2%.  Diet and exercise recommendations discussed.             Relevant Medications    glipiZIDE XL (Glucotrol XL) 5 mg 24 hr tablet    Other Relevant Orders    POCT glycosylated hemoglobin (Hb A1C) manually resulted       Continue current medications as listed  Follow up in 3 months.             [1]   Current Outpatient Medications:     acetaminophen (TylenoL) 325 mg tablet, Take 2 tablets (650 mg) by mouth every 4 hours if needed., Disp: , Rfl:     albuterol 90 mcg/actuation inhaler, Inhale 2 puffs every 6 hours if needed for shortness of breath., Disp: 18 g, Rfl: 2    amLODIPine (Norvasc) 5 mg tablet, Take 1 tablet (5 mg) by mouth once daily., Disp: 90 tablet, Rfl: 3    aspirin 81 mg EC tablet, Take 1 tablet (81 mg) by mouth once daily., Disp: , Rfl:     atorvastatin (Lipitor) 40 mg tablet, Take 1 tablet (40 mg) by mouth once daily., Disp: 90 tablet, Rfl: 3    cholecalciferol (Vitamin D-3) 25 MCG (1000 UT) tablet, Take 2 tablets (2,000 Units) by mouth 2 times a day., Disp: , Rfl:     clopidogrel (Plavix) 75 mg tablet, Take 1 tablet (75 mg) by mouth once daily., Disp: 90 tablet, Rfl: 3    cyanocobalamin (Vitamin B-12) 1,000 mcg tablet, Take 1 tablet (1,000 mcg) by mouth once daily., Disp: , Rfl:     hydroCHLOROthiazide (Microzide) 12.5 mg tablet, Take 1 tablet (12.5 mg) by mouth once daily., Disp: 90 tablet, Rfl: 1    ipratropium-albuteroL (Duo-Neb) 0.5-2.5 mg/3 mL nebulizer solution, Take 3 mL  by nebulization every 4 hours if needed for wheezing., Disp: , Rfl:     levothyroxine (Synthroid, Levoxyl) 25 mcg tablet, Take 1 tablet (25 mcg) by mouth once daily., Disp: 90 tablet, Rfl: 1    nitroglycerin (Nitrostat) 0.4 mg SL tablet, Place 1 tablet (0.4 mg) under the tongue every 5 minutes if needed for chest pain., Disp: 90 tablet, Rfl: 0    Refresh Optive Advanced 0.5-1-0.5 % drops, Administer 2 drops into both eyes once daily., Disp: , Rfl:     sertraline (Zoloft) 50 mg tablet, Take 1 tablet (50 mg) by mouth once daily., Disp: 30 tablet, Rfl: 0    spironolactone (Aldactone) 25 mg tablet, Take 1 tablet (25 mg) by mouth once daily., Disp: 90 tablet, Rfl: 1    glipiZIDE XL (Glucotrol XL) 5 mg 24 hr tablet, Take 2 tablets (10 mg) by mouth once daily. Do not crush, chew, or split., Disp: 90 tablet, Rfl: 3

## 2025-05-05 NOTE — ASSESSMENT & PLAN NOTE
Hemoglobin A1c in the office today is 8.3%.  Last A1c was 7.2%.  Diet and exercise recommendations discussed.

## 2025-05-06 LAB — POC HEMOGLOBIN A1C: 8.3 % (ref 4.2–6.5)

## 2025-05-21 DIAGNOSIS — E11.65 TYPE 2 DIABETES MELLITUS WITH HYPERGLYCEMIA, UNSPECIFIED WHETHER LONG TERM INSULIN USE (MULTI): ICD-10-CM

## 2025-06-03 LAB
ALBUMIN/CREAT UR: 16 MG/G CREAT
CREAT UR-MCNC: 102 MG/DL (ref 20–275)
MICROALBUMIN UR-MCNC: 1.6 MG/DL

## 2025-07-24 ENCOUNTER — APPOINTMENT (OUTPATIENT)
Dept: CARDIOLOGY | Facility: CLINIC | Age: 65
End: 2025-07-24
Payer: COMMERCIAL

## 2025-07-24 VITALS
SYSTOLIC BLOOD PRESSURE: 124 MMHG | BODY MASS INDEX: 36.87 KG/M2 | HEART RATE: 76 BPM | DIASTOLIC BLOOD PRESSURE: 80 MMHG | HEIGHT: 62 IN | WEIGHT: 200.34 LBS

## 2025-07-24 DIAGNOSIS — Z98.61 CAD S/P PERCUTANEOUS CORONARY ANGIOPLASTY: ICD-10-CM

## 2025-07-24 DIAGNOSIS — K21.9 GASTROESOPHAGEAL REFLUX DISEASE, UNSPECIFIED WHETHER ESOPHAGITIS PRESENT: ICD-10-CM

## 2025-07-24 DIAGNOSIS — Z87.891 FORMER SMOKER: ICD-10-CM

## 2025-07-24 DIAGNOSIS — I63.9 CEREBROVASCULAR ACCIDENT (CVA), UNSPECIFIED MECHANISM (MULTI): ICD-10-CM

## 2025-07-24 DIAGNOSIS — E78.2 MIXED HYPERLIPIDEMIA: ICD-10-CM

## 2025-07-24 DIAGNOSIS — I10 PRIMARY HYPERTENSION: ICD-10-CM

## 2025-07-24 DIAGNOSIS — E11.65 TYPE 2 DIABETES MELLITUS WITH HYPERGLYCEMIA, UNSPECIFIED WHETHER LONG TERM INSULIN USE (MULTI): ICD-10-CM

## 2025-07-24 DIAGNOSIS — I25.10 CAD S/P PERCUTANEOUS CORONARY ANGIOPLASTY: ICD-10-CM

## 2025-07-24 PROCEDURE — 3052F HG A1C>EQUAL 8.0%<EQUAL 9.0%: CPT | Performed by: INTERNAL MEDICINE

## 2025-07-24 PROCEDURE — 3079F DIAST BP 80-89 MM HG: CPT | Performed by: INTERNAL MEDICINE

## 2025-07-24 PROCEDURE — 3008F BODY MASS INDEX DOCD: CPT | Performed by: INTERNAL MEDICINE

## 2025-07-24 PROCEDURE — 3074F SYST BP LT 130 MM HG: CPT | Performed by: INTERNAL MEDICINE

## 2025-07-24 PROCEDURE — 1159F MED LIST DOCD IN RCRD: CPT | Performed by: INTERNAL MEDICINE

## 2025-07-24 PROCEDURE — 99213 OFFICE O/P EST LOW 20 MIN: CPT | Performed by: INTERNAL MEDICINE

## 2025-07-24 NOTE — PROGRESS NOTES
Referred by Dr. Hassan ref. provider found provider found for   Chief Complaint   Patient presents with    Follow-up     6 month follow up of ongoing management of   CAD  HTN   hyperlipidemia        History of Present Illness  Lalita Dumont is a 65 y.o. year old female patient for follow-up 6 months following coronary angioplasty with stent plantation of the circumflex.  Doing well from cardiac standpoint no complaint no symptoms of chest pain or shortness of breath.  She is on dual antiplatelet therapy.  Discussed with the patient that we will continue dual antiplatelet therapy till the total of 12 months.  Will call for any problem.  Will see me back in 6 months    Past Medical History  Medical History[1]    Social History  Social History[2]    Family History   Family History[3]    Review of Systems  As per HPI, all other systems reviewed and negative.    Allergies:  RX Allergies[4]     Outpatient Medications:  Current Outpatient Medications   Medication Instructions    acetaminophen (TYLENOL) 650 mg, Every 4 hours PRN    albuterol 90 mcg/actuation inhaler 2 puffs, inhalation, Every 6 hours PRN    amLODIPine (NORVASC) 5 mg, oral, Daily    aspirin 81 mg EC tablet 1 tablet, Daily    atorvastatin (LIPITOR) 40 mg, oral, Daily    cholecalciferol (VITAMIN D-3) 2,000 Units, 2 times daily    clopidogrel (PLAVIX) 75 mg, oral, Daily    cyanocobalamin (Vitamin B-12) 1,000 mcg tablet 1 tablet, Daily    glipiZIDE XL (GLUCOTROL XL) 10 mg, oral, Daily, Do not crush, chew, or split.    hydroCHLOROthiazide (MICROZIDE) 12.5 mg, oral, Daily    ipratropium-albuteroL (Duo-Neb) 0.5-2.5 mg/3 mL nebulizer solution 3 mL, Every 4 hours PRN    levothyroxine (SYNTHROID, LEVOXYL) 25 mcg, oral, Daily    nitroglycerin (NITROSTAT) 0.4 mg, sublingual, Every 5 min PRN    Refresh Optive Advanced 0.5-1-0.5 % drops 2 drops, Daily    sertraline (ZOLOFT) 50 mg, oral, Daily    spironolactone (ALDACTONE) 25 mg, oral, Daily         Vitals:  Vitals:     07/24/25 0936   BP: 124/80   Pulse: 76       Physical Exam:  Physical Exam  Vitals and nursing note reviewed.   Constitutional:       Appearance: Normal appearance. She is normal weight.   HENT:      Head: Normocephalic and atraumatic.     Eyes:      Extraocular Movements: Extraocular movements intact.      Pupils: Pupils are equal, round, and reactive to light.       Cardiovascular:      Rate and Rhythm: Normal rate and regular rhythm.      Pulses: Normal pulses.   Pulmonary:      Effort: Pulmonary effort is normal.      Breath sounds: Normal breath sounds.     Musculoskeletal:      Cervical back: Normal range of motion.      Right lower leg: No edema.      Left lower leg: No edema.     Skin:     General: Skin is warm and dry.     Neurological:      General: No focal deficit present.      Mental Status: She is alert and oriented to person, place, and time.             Assessment/Plan   Diagnoses and all orders for this visit:  CAD S/P percutaneous coronary angioplasty  Primary hypertension  Mixed hyperlipidemia  BMI 36.0-36.9,adult  Type 2 diabetes mellitus with hyperglycemia, unspecified whether long term insulin use (Multi)  Gastroesophageal reflux disease, unspecified whether esophagitis present  Cerebrovascular accident (CVA), unspecified mechanism (Multi)  Former smoker      Vasile WAKEFIELD RN   am scribing for, and in the presence of Dr. [facility] ..    IDr. Tae MD [facility]  personally performed the services described in the documentation as scribed by Vasile Dwyer RN   in my presence, and confirm it is both accurate and complete.      Tae Blanchard MD FACC  Interventional Cardiology   [facility]     Thank you for allowing me to participate in the care of this patient. Please do not hesitate to contact me with any further questions or concerns.         [1]   Past Medical History:  Diagnosis Date    Allergic     Arthritis     Asthma     CHF (congestive heart failure)      Congenital heart disease     Coronary artery disease     Diabetes mellitus (Multi)     Disease of thyroid gland     Headache     Heart disease     Hypertension     Personal history of other diseases of the circulatory system     History of hypertension    Personal history of other diseases of the musculoskeletal system and connective tissue     History of rheumatoid arthritis    Personal history of other diseases of the respiratory system     History of asthma    Sleep apnea     Stroke (Multi)     Vitamin D deficiency, unspecified     Vitamin D deficiency   [2]   Social History  Tobacco Use    Smoking status: Former     Current packs/day: 0.00     Types: Cigarettes     Quit date: 10/1/1989     Years since quittin.8    Smokeless tobacco: Never    Tobacco comments:     No longer an interest   Vaping Use    Vaping status: Never Used   Substance Use Topics    Alcohol use: Not Currently     Comment: Quit     Drug use: Not Currently   [3]   Family History  Problem Relation Name Age of Onset    Cancer Mother Stephanie     Depression Mother Stephanie     Other (acute myocardial infarction) Father Kal     Heart disease Father Kal     Heart attack Father Kal     Cancer Sister Teresa     Cancer Brother Kal     Diabetes Brother Kal     Diabetes Brother Jay     Diabetes Brother Mumtaz     Mental illness Brother Mumtaz     Cancer Maternal Grandmother      Cancer Maternal Grandfather Juan Luis     Cancer Paternal Grandmother Amanda     Cancer Paternal Grandfather Que     Diabetes Paternal Grandfather Que     Diabetes Other paternal aunt    [4]   Allergies  Allergen Reactions    Bactrim [Sulfamethoxazole-Trimethoprim] GI Upset    Codeine Swelling    Darvocet A500 [Propoxyphene N-Acetaminophen] Hives    Erythromycin Itching and Swelling    Imdur [Isosorbide Mononitrate] Headache    Lisinopril Swelling    Omeprazole Hives    Penicillins Swelling    Azithromycin Rash

## 2025-07-24 NOTE — PATIENT INSTRUCTIONS
Patient to follow up in January with Dr. Tae Blanchard MD FACC     No changes today.   Continue same medications and treatments.   Patient educated on proper medication use.   Patient educated on risk factor modification.   Please bring any lab results from other providers / physicians to your next appointment.     Please bring all medicines, vitamins, and herbal supplements with you when you come to the office.     Prescriptions will not be filled unless you are compliant with your follow up appointments or have a follow up appointment scheduled as per instruction of your physician. Refills should be requested at the time of your visit.    IVasile RN am scribing for and in the presence of Dr. Tae Blanchard MD FACC

## 2025-07-30 ENCOUNTER — OFFICE VISIT (OUTPATIENT)
Dept: FAMILY MEDICINE CLINIC | Age: 65
End: 2025-07-30
Payer: COMMERCIAL

## 2025-07-30 VITALS
WEIGHT: 197.8 LBS | BODY MASS INDEX: 36.4 KG/M2 | HEIGHT: 62 IN | TEMPERATURE: 99.2 F | HEART RATE: 87 BPM | DIASTOLIC BLOOD PRESSURE: 78 MMHG | OXYGEN SATURATION: 95 % | SYSTOLIC BLOOD PRESSURE: 122 MMHG

## 2025-07-30 DIAGNOSIS — B34.9 VIRAL ILLNESS: ICD-10-CM

## 2025-07-30 DIAGNOSIS — E11.65 TYPE 2 DIABETES MELLITUS WITH HYPERGLYCEMIA, UNSPECIFIED WHETHER LONG TERM INSULIN USE (HCC): ICD-10-CM

## 2025-07-30 DIAGNOSIS — H65.03 NON-RECURRENT ACUTE SEROUS OTITIS MEDIA OF BOTH EARS: ICD-10-CM

## 2025-07-30 DIAGNOSIS — J01.40 ACUTE NON-RECURRENT PANSINUSITIS: ICD-10-CM

## 2025-07-30 DIAGNOSIS — J45.31 MILD PERSISTENT ASTHMA WITH EXACERBATION: Primary | ICD-10-CM

## 2025-07-30 DIAGNOSIS — R06.2 WHEEZING: ICD-10-CM

## 2025-07-30 PROBLEM — F32.A DEPRESSION: Status: ACTIVE | Noted: 2023-10-20

## 2025-07-30 PROBLEM — M05.79 RHEUMATOID ARTHRITIS INVOLVING MULTIPLE SITES WITH POSITIVE RHEUMATOID FACTOR (HCC): Status: ACTIVE | Noted: 2021-11-21

## 2025-07-30 PROBLEM — E78.2 MIXED HYPERLIPIDEMIA: Status: ACTIVE | Noted: 2019-03-21

## 2025-07-30 PROBLEM — R09.81 SINUS CONGESTION: Status: ACTIVE | Noted: 2024-10-17

## 2025-07-30 PROBLEM — L93.0 LUPUS ERYTHEMATOSUS: Status: ACTIVE | Noted: 2022-07-05

## 2025-07-30 PROBLEM — J45.909 ASTHMA: Status: ACTIVE | Noted: 2023-10-20

## 2025-07-30 PROBLEM — J44.9 COPD (CHRONIC OBSTRUCTIVE PULMONARY DISEASE) (HCC): Status: ACTIVE | Noted: 2019-03-21

## 2025-07-30 PROBLEM — E03.9 HYPOTHYROIDISM: Status: ACTIVE | Noted: 2023-10-20

## 2025-07-30 PROBLEM — M79.7 FIBROMYALGIA: Status: ACTIVE | Noted: 2023-10-20

## 2025-07-30 LAB
INFLUENZA A ANTIBODY: NEGATIVE
INFLUENZA B ANTIBODY: NEGATIVE
Lab: NORMAL
PERFORMING INSTRUMENT: NORMAL
QC PASS/FAIL: NORMAL
S PYO AG THROAT QL: NORMAL
SARS-COV-2, POC: NORMAL

## 2025-07-30 PROCEDURE — 99204 OFFICE O/P NEW MOD 45 MIN: CPT | Performed by: NURSE PRACTITIONER

## 2025-07-30 PROCEDURE — 1159F MED LIST DOCD IN RCRD: CPT | Performed by: NURSE PRACTITIONER

## 2025-07-30 PROCEDURE — 1123F ACP DISCUSS/DSCN MKR DOCD: CPT | Performed by: NURSE PRACTITIONER

## 2025-07-30 PROCEDURE — 87880 STREP A ASSAY W/OPTIC: CPT | Performed by: NURSE PRACTITIONER

## 2025-07-30 PROCEDURE — 87804 INFLUENZA ASSAY W/OPTIC: CPT | Performed by: NURSE PRACTITIONER

## 2025-07-30 PROCEDURE — 87426 SARSCOV CORONAVIRUS AG IA: CPT | Performed by: NURSE PRACTITIONER

## 2025-07-30 RX ORDER — ALBUTEROL SULFATE 0.83 MG/ML
2.5 SOLUTION RESPIRATORY (INHALATION) 4 TIMES DAILY PRN
Qty: 120 EACH | Refills: 0 | Status: SHIPPED | OUTPATIENT
Start: 2025-07-30

## 2025-07-30 RX ORDER — METHYLPREDNISOLONE 4 MG/1
TABLET ORAL
Qty: 1 KIT | Refills: 0 | Status: SHIPPED | OUTPATIENT
Start: 2025-07-30

## 2025-07-30 RX ORDER — CEPHALEXIN 500 MG/1
500 CAPSULE ORAL 2 TIMES DAILY
Qty: 14 CAPSULE | Refills: 0 | Status: SHIPPED | OUTPATIENT
Start: 2025-07-30 | End: 2025-08-06

## 2025-07-30 RX ORDER — FLUTICASONE PROPIONATE 50 MCG
SPRAY, SUSPENSION (ML) NASAL
Qty: 1 EACH | Refills: 1 | Status: SHIPPED | OUTPATIENT
Start: 2025-07-30

## 2025-07-30 RX ORDER — ALBUTEROL SULFATE 0.83 MG/ML
2.5 SOLUTION RESPIRATORY (INHALATION) 4 TIMES DAILY PRN
Qty: 120 EACH | Refills: 0 | Status: SHIPPED | OUTPATIENT
Start: 2025-07-30 | End: 2025-07-30 | Stop reason: CLARIF

## 2025-07-30 RX ORDER — CETIRIZINE HYDROCHLORIDE 10 MG/1
10 TABLET ORAL DAILY
Qty: 30 TABLET | Refills: 0 | Status: SHIPPED | OUTPATIENT
Start: 2025-07-30 | End: 2025-08-29

## 2025-07-30 SDOH — ECONOMIC STABILITY: FOOD INSECURITY: WITHIN THE PAST 12 MONTHS, YOU WORRIED THAT YOUR FOOD WOULD RUN OUT BEFORE YOU GOT MONEY TO BUY MORE.: NEVER TRUE

## 2025-07-30 SDOH — ECONOMIC STABILITY: FOOD INSECURITY: WITHIN THE PAST 12 MONTHS, THE FOOD YOU BOUGHT JUST DIDN'T LAST AND YOU DIDN'T HAVE MONEY TO GET MORE.: NEVER TRUE

## 2025-07-30 ASSESSMENT — ENCOUNTER SYMPTOMS
WHEEZING: 0
EYE DISCHARGE: 0
EYE ITCHING: 0
SORE THROAT: 1
RHINORRHEA: 1
SHORTNESS OF BREATH: 0
EYE REDNESS: 0
COUGH: 0
SINUS PRESSURE: 1

## 2025-07-30 ASSESSMENT — PATIENT HEALTH QUESTIONNAIRE - PHQ9
2. FEELING DOWN, DEPRESSED OR HOPELESS: NOT AT ALL
SUM OF ALL RESPONSES TO PHQ QUESTIONS 1-9: 0
SUM OF ALL RESPONSES TO PHQ QUESTIONS 1-9: 0
1. LITTLE INTEREST OR PLEASURE IN DOING THINGS: NOT AT ALL
SUM OF ALL RESPONSES TO PHQ QUESTIONS 1-9: 0
SUM OF ALL RESPONSES TO PHQ QUESTIONS 1-9: 0

## 2025-07-30 NOTE — PROGRESS NOTES
Urszula Zuñiga (:  1960) is a 65 y.o. female, Established patient, here for evaluation of the following chief complaint(s):  Other (Sore throat, bilateral ear pain, headaches, x 5 days/Body aches, chills, possible fever)      Vitals:    25 1036   BP: 122/78   Pulse: 87   Temp: 99.2 °F (37.3 °C)   SpO2: 95%       ASSESSMENT/PLAN:  1. Mild persistent asthma with exacerbation  -     methylPREDNISolone (MEDROL, GUZMAN,) 4 MG tablet; Take by mouth., Disp-1 kit, R-0Normal  -     fluticasone (FLONASE) 50 MCG/ACT nasal spray; Take 1 spray each nostril at night, Disp-1 each, R-1Normal  -     albuterol (PROVENTIL) (2.5 MG/3ML) 0.083% nebulizer solution; Take 3 mLs by nebulization 4 times daily as needed for Wheezing, Disp-120 each, R-0Print  2. Wheezing  -     methylPREDNISolone (MEDROL, GUZMAN,) 4 MG tablet; Take by mouth., Disp-1 kit, R-0Normal  -     albuterol (PROVENTIL) (2.5 MG/3ML) 0.083% nebulizer solution; Take 3 mLs by nebulization 4 times daily as needed for Wheezing, Disp-120 each, R-0Print  3. Acute non-recurrent pansinusitis  -     fluticasone (FLONASE) 50 MCG/ACT nasal spray; Take 1 spray each nostril at night, Disp-1 each, R-1Normal  -     cetirizine (ZYRTEC) 10 MG tablet; Take 1 tablet by mouth daily, Disp-30 tablet, R-0Normal  -     cephALEXin (KEFLEX) 500 MG capsule; Take 1 capsule by mouth 2 times daily for 7 days, Disp-14 capsule, R-0Normal  4. Non-recurrent acute serous otitis media of both ears  -     cetirizine (ZYRTEC) 10 MG tablet; Take 1 tablet by mouth daily, Disp-30 tablet, R-0Normal  5. Type 2 diabetes mellitus with hyperglycemia, unspecified whether long term insulin use (HCC)        -     discussed use of oral steroid will elevate blood sugar        -     advised to avoid extra carbs/sweets        -     monitor blood sugar levels        -     follow up with PCP or go to the ER should blood sugars >350        -     most recent A1C was 7.2 in 2025  6. Viral illness  -     POCT rapid

## 2025-08-01 ASSESSMENT — ENCOUNTER SYMPTOMS
FATIGUE: 1
WEAKNESS: 1

## 2025-08-02 LAB — BACTERIA THROAT AEROBE CULT: NORMAL

## 2025-08-04 ENCOUNTER — RESULTS FOLLOW-UP (OUTPATIENT)
Dept: INTERNAL MEDICINE | Age: 65
End: 2025-08-04

## 2025-08-05 ENCOUNTER — APPOINTMENT (OUTPATIENT)
Dept: PRIMARY CARE | Facility: CLINIC | Age: 65
End: 2025-08-05
Payer: COMMERCIAL

## 2025-08-05 VITALS
TEMPERATURE: 97.4 F | WEIGHT: 196 LBS | OXYGEN SATURATION: 97 % | SYSTOLIC BLOOD PRESSURE: 110 MMHG | HEART RATE: 76 BPM | HEIGHT: 62 IN | BODY MASS INDEX: 36.07 KG/M2 | DIASTOLIC BLOOD PRESSURE: 80 MMHG

## 2025-08-05 DIAGNOSIS — Z71.2 ENCOUNTER TO DISCUSS TEST RESULTS: ICD-10-CM

## 2025-08-05 DIAGNOSIS — F32.A DEPRESSION, UNSPECIFIED DEPRESSION TYPE: ICD-10-CM

## 2025-08-05 DIAGNOSIS — E78.2 MIXED HYPERLIPIDEMIA: ICD-10-CM

## 2025-08-05 DIAGNOSIS — E03.9 ACQUIRED HYPOTHYROIDISM: ICD-10-CM

## 2025-08-05 DIAGNOSIS — J44.9 CHRONIC OBSTRUCTIVE PULMONARY DISEASE, UNSPECIFIED COPD TYPE (MULTI): ICD-10-CM

## 2025-08-05 DIAGNOSIS — J01.00 ACUTE NON-RECURRENT MAXILLARY SINUSITIS: ICD-10-CM

## 2025-08-05 DIAGNOSIS — I10 HYPERTENSION, UNSPECIFIED TYPE: ICD-10-CM

## 2025-08-05 DIAGNOSIS — Z98.61 CAD S/P PERCUTANEOUS CORONARY ANGIOPLASTY: ICD-10-CM

## 2025-08-05 DIAGNOSIS — J45.20 MILD INTERMITTENT ASTHMA WITHOUT COMPLICATION (HHS-HCC): ICD-10-CM

## 2025-08-05 DIAGNOSIS — I25.10 CAD S/P PERCUTANEOUS CORONARY ANGIOPLASTY: ICD-10-CM

## 2025-08-05 DIAGNOSIS — E11.65 TYPE 2 DIABETES MELLITUS WITH HYPERGLYCEMIA, WITHOUT LONG-TERM CURRENT USE OF INSULIN: Primary | ICD-10-CM

## 2025-08-05 PROBLEM — J01.90 ACUTE SINUSITIS: Status: RESOLVED | Noted: 2023-10-20 | Resolved: 2025-08-05

## 2025-08-05 LAB — POC HEMOGLOBIN A1C: 7.7 % (ref 4.2–6.5)

## 2025-08-05 PROCEDURE — 3051F HG A1C>EQUAL 7.0%<8.0%: CPT | Performed by: FAMILY MEDICINE

## 2025-08-05 PROCEDURE — 3079F DIAST BP 80-89 MM HG: CPT | Performed by: FAMILY MEDICINE

## 2025-08-05 PROCEDURE — 99214 OFFICE O/P EST MOD 30 MIN: CPT | Performed by: FAMILY MEDICINE

## 2025-08-05 PROCEDURE — 1126F AMNT PAIN NOTED NONE PRSNT: CPT | Performed by: FAMILY MEDICINE

## 2025-08-05 PROCEDURE — 83036 HEMOGLOBIN GLYCOSYLATED A1C: CPT | Performed by: FAMILY MEDICINE

## 2025-08-05 PROCEDURE — 1160F RVW MEDS BY RX/DR IN RCRD: CPT | Performed by: FAMILY MEDICINE

## 2025-08-05 PROCEDURE — 1159F MED LIST DOCD IN RCRD: CPT | Performed by: FAMILY MEDICINE

## 2025-08-05 PROCEDURE — 3008F BODY MASS INDEX DOCD: CPT | Performed by: FAMILY MEDICINE

## 2025-08-05 PROCEDURE — 3074F SYST BP LT 130 MM HG: CPT | Performed by: FAMILY MEDICINE

## 2025-08-05 RX ORDER — ATORVASTATIN CALCIUM 40 MG/1
40 TABLET, FILM COATED ORAL DAILY
Qty: 90 TABLET | Refills: 3 | Status: SHIPPED | OUTPATIENT
Start: 2025-08-05

## 2025-08-05 RX ORDER — ALBUTEROL SULFATE 90 UG/1
2 INHALANT RESPIRATORY (INHALATION) EVERY 6 HOURS PRN
Qty: 18 G | Refills: 2 | Status: SHIPPED | OUTPATIENT
Start: 2025-08-05

## 2025-08-05 RX ORDER — FLUTICASONE PROPIONATE 50 MCG
SPRAY, SUSPENSION (ML) NASAL
COMMUNITY
Start: 2025-07-30 | End: 2025-08-05 | Stop reason: SDUPTHER

## 2025-08-05 RX ORDER — SERTRALINE HYDROCHLORIDE 100 MG/1
100 TABLET, FILM COATED ORAL DAILY
Qty: 90 TABLET | Refills: 3 | Status: SHIPPED | OUTPATIENT
Start: 2025-08-05

## 2025-08-05 RX ORDER — CEPHALEXIN 500 MG/1
500 CAPSULE ORAL 2 TIMES DAILY
COMMUNITY
Start: 2025-07-30 | End: 2025-08-06

## 2025-08-05 RX ORDER — CETIRIZINE HYDROCHLORIDE 10 MG/1
10 TABLET ORAL DAILY
COMMUNITY
Start: 2025-07-30 | End: 2025-08-29

## 2025-08-05 RX ORDER — SPIRONOLACTONE 25 MG/1
25 TABLET ORAL DAILY
Qty: 90 TABLET | Refills: 3 | Status: SHIPPED | OUTPATIENT
Start: 2025-08-05

## 2025-08-05 RX ORDER — HYDROCHLOROTHIAZIDE 12.5 MG/1
12.5 TABLET ORAL DAILY
Qty: 90 TABLET | Refills: 3 | Status: SHIPPED | OUTPATIENT
Start: 2025-08-05

## 2025-08-05 RX ORDER — LEVOTHYROXINE SODIUM 25 UG/1
25 TABLET ORAL DAILY
Qty: 90 TABLET | Refills: 3 | Status: SHIPPED | OUTPATIENT
Start: 2025-08-05

## 2025-08-05 RX ORDER — ALBUTEROL SULFATE 0.83 MG/ML
SOLUTION RESPIRATORY (INHALATION)
COMMUNITY
Start: 2025-07-30

## 2025-08-05 RX ORDER — FLUTICASONE PROPIONATE 50 MCG
2 SPRAY, SUSPENSION (ML) NASAL DAILY
Qty: 16 G | Refills: 3 | Status: SHIPPED | OUTPATIENT
Start: 2025-08-05

## 2025-08-05 RX ORDER — METHYLPREDNISOLONE 4 MG/1
TABLET ORAL
COMMUNITY
Start: 2025-07-30

## 2025-08-05 RX ORDER — NITROGLYCERIN 0.4 MG/1
0.4 TABLET SUBLINGUAL EVERY 5 MIN PRN
Qty: 90 TABLET | Refills: 0 | Status: SHIPPED | OUTPATIENT
Start: 2025-08-05

## 2025-08-05 RX ORDER — METFORMIN HYDROCHLORIDE 500 MG/1
500 TABLET ORAL
Qty: 200 TABLET | Refills: 3 | Status: SHIPPED | OUTPATIENT
Start: 2025-08-05 | End: 2026-09-09

## 2025-08-05 ASSESSMENT — ENCOUNTER SYMPTOMS
WEAKNESS: 1
FATIGUE: 1

## 2025-08-05 ASSESSMENT — PAIN SCALES - GENERAL: PAINLEVEL_OUTOF10: 0-NO PAIN

## 2025-08-05 NOTE — ASSESSMENT & PLAN NOTE
Hemoglobin A1c today is 7.7%, up from January at 7.2%.  Adding back metformin 500 mg twice a day.  Follow-up 3 months.

## 2025-08-05 NOTE — PROGRESS NOTES
Subjective   Chief complaint: Lalita Dumont is a 65 y.o. female who presents for Follow-up.    HPI:  Lalita is here for a 3-month follow-up.  She recently was diagnosed with an acute sinusitis, bilateral ear pain and a sore throat.  She had been given an antibiotic and that has made her feel better.  She has a history of asthma.  No shortness of breath at this time.  Is here to follow-up on her diabetes management.  In January her A1c was 7.2%.  No concerns with medication at this time.  She feels that she should we start her metformin medication.  She had been on it in the past but one of her siblings talked her out of taking it.  She feels that she should just restarted it twice a day as directed.  She had been on it in the past and reports she did not have any side effects from taking the medication.  Reports her health responses from 4 days ago are better.      Diabetes  No MedicAlert identification noted. The initial diagnosis of diabetes was made 3 years ago. Associated symptoms include fatigue, foot paresthesias and weakness. Symptoms are improving. Risk factors for coronary artery disease include dyslipidemia, hypertension, obesity and sedentary lifestyle. Current diabetic treatment includes diet and oral agent (monotherapy). She is compliant with treatment some of the time. Her weight is fluctuating dramatically. She is following a generally healthy and low salt diet. Meal planning includes avoidance of concentrated sweets and carbohydrate counting. She has not had a previous visit with a dietitian. She rarely participates in exercise. She monitors blood glucose at home 1-2 x per day. She monitors urine at home <1 x per month. Blood glucose monitoring compliance is good. Her home blood glucose trend is fluctuating minimally. Her breakfast blood glucose is taken between 8-9 am. Her breakfast blood glucose range is generally 140-180 mg/dl. Her lunch blood glucose is taken between 12-1 pm. Her lunch blood  "glucose range is generally 140-180 mg/dl. Her dinner blood glucose is taken between 6-7 pm. Her bedtime blood glucose is taken after 11 pm. Her overall blood glucose range is 180-200 mg/dl. She sees a podiatrist.Eye exam is current.       Objective   /80 (BP Location: Right arm, Patient Position: Sitting, BP Cuff Size: Adult)   Pulse 76   Temp 36.3 °C (97.4 °F) (Temporal)   Ht 1.575 m (5' 2\")   Wt 88.9 kg (196 lb)   SpO2 97%   BMI 35.85 kg/m²   Physical Exam  Constitutional:       Appearance: Normal appearance. She is obese.   HENT:      Head: Normocephalic and atraumatic.      Nose: Nose normal. No congestion.     Eyes:      Conjunctiva/sclera: Conjunctivae normal.       Cardiovascular:      Rate and Rhythm: Normal rate and regular rhythm.   Pulmonary:      Effort: Pulmonary effort is normal. No respiratory distress.      Breath sounds: Normal breath sounds. No wheezing, rhonchi or rales.     Musculoskeletal:      Cervical back: Neck supple.      Right lower leg: No edema.      Left lower leg: No edema.     Skin:     General: Skin is warm and dry.     Neurological:      General: No focal deficit present.      Mental Status: She is alert and oriented to person, place, and time.     Psychiatric:         Mood and Affect: Mood normal.         Behavior: Behavior normal.         Judgment: Judgment normal.     Endocrine:  HgbA1c in office today is 7.7%.    Review of Systems   Constitutional:  Positive for fatigue.   Neurological:  Positive for weakness.      I have reviewed and reconciled the medication list with the patient today. Current Medications[1]     Imaging:  Imaging  No results found.    Cardiology, Vascular, and Other Imaging  No other imaging results found for the past 7 days       Labs reviewed:    Lab Results   Component Value Date    WBC 5.6 01/11/2025    HGB 13.3 01/11/2025    HCT 39.2 01/11/2025     01/11/2025    CHOL 121 01/09/2025    TRIG 173 (H) 01/09/2025    HDL 36.9 01/09/2025    " ALT 17 01/11/2025    AST 19 01/11/2025     01/11/2025    K 3.8 01/11/2025     01/11/2025    CREATININE 1.00 01/11/2025    BUN 18 01/11/2025    CO2 27 01/11/2025    TSH 5.31 (H) 01/09/2025    INR 1.1 10/09/2023    HGBA1C 7.7 (A) 08/05/2025       Assessment/Plan   Problem List Items Addressed This Visit       RESOLVED: Acute sinusitis    Relevant Medications    fluticasone (Flonase) 50 mcg/actuation nasal spray    Asthma    Medication refills given.         Relevant Medications    albuterol 90 mcg/actuation inhaler    CAD S/P percutaneous coronary angioplasty    Relevant Medications    atorvastatin (Lipitor) 40 mg tablet    hydroCHLOROthiazide (Microzide) 12.5 mg tablet    nitroglycerin (Nitrostat) 0.4 mg SL tablet    spironolactone (Aldactone) 25 mg tablet    Depression    Doing ok.  Refills given.         Relevant Medications    levothyroxine (Synthroid, Levoxyl) 25 mcg tablet    sertraline (Zoloft) 100 mg tablet    Encounter to discuss test results    Reviewed lab/testing results with the patient and provided patient education regarding the results.           Hypertension    Relevant Medications    hydroCHLOROthiazide (Microzide) 12.5 mg tablet    nitroglycerin (Nitrostat) 0.4 mg SL tablet    spironolactone (Aldactone) 25 mg tablet    Hypothyroidism    Relevant Medications    methylPREDNISolone (Medrol Dospak) 4 mg tablets    levothyroxine (Synthroid, Levoxyl) 25 mcg tablet    Mixed hyperlipidemia    Relevant Medications    atorvastatin (Lipitor) 40 mg tablet    Type 2 diabetes mellitus with hyperglycemia (Multi) - Primary    Hemoglobin A1c today is 7.7%, up from January at 7.2%.  Adding back metformin 500 mg twice a day.  Follow-up 3 months.         Relevant Medications    metFORMIN (Glucophage) 500 mg tablet    atorvastatin (Lipitor) 40 mg tablet    Other Relevant Orders    POCT glycosylated hemoglobin (Hb A1C) manually resulted (Completed)       Continue current medications as listed  Follow up in 3  months.              [1]   Current Outpatient Medications:     acetaminophen (TylenoL) 325 mg tablet, Take 2 tablets (650 mg) by mouth every 4 hours if needed., Disp: , Rfl:     albuterol 2.5 mg /3 mL (0.083 %) nebulizer solution, USE 1 VIAL VIA NEBULIZER FOUR TIMES DAILY AS NEEDED FOR WHEEZING, Disp: , Rfl:     amLODIPine (Norvasc) 5 mg tablet, Take 1 tablet (5 mg) by mouth once daily., Disp: 90 tablet, Rfl: 3    aspirin 81 mg EC tablet, Take 1 tablet (81 mg) by mouth once daily., Disp: , Rfl:     cephalexin (Keflex) 500 mg capsule, Take 1 capsule (500 mg) by mouth 2 times a day., Disp: , Rfl:     cetirizine (ZyrTEC) 10 mg tablet, Take 1 tablet (10 mg) by mouth once daily., Disp: , Rfl:     cholecalciferol (Vitamin D-3) 25 MCG (1000 UT) tablet, Take 2 tablets (2,000 Units) by mouth 2 times a day., Disp: , Rfl:     clopidogrel (Plavix) 75 mg tablet, Take 1 tablet (75 mg) by mouth once daily., Disp: 90 tablet, Rfl: 3    cyanocobalamin (Vitamin B-12) 1,000 mcg tablet, Take 1 tablet (1,000 mcg) by mouth once daily., Disp: , Rfl:     glipiZIDE XL (Glucotrol XL) 5 mg 24 hr tablet, Take 2 tablets (10 mg) by mouth once daily. Do not crush, chew, or split., Disp: 90 tablet, Rfl: 3    ipratropium-albuteroL (Duo-Neb) 0.5-2.5 mg/3 mL nebulizer solution, Take 3 mL by nebulization every 4 hours if needed for wheezing., Disp: , Rfl:     methylPREDNISolone (Medrol Dospak) 4 mg tablets, take by mouth as directed on package, Disp: , Rfl:     Refresh Optive Advanced 0.5-1-0.5 % drops, Administer 2 drops into both eyes once daily., Disp: , Rfl:     albuterol 90 mcg/actuation inhaler, Inhale 2 puffs every 6 hours if needed for shortness of breath., Disp: 18 g, Rfl: 2    atorvastatin (Lipitor) 40 mg tablet, Take 1 tablet (40 mg) by mouth once daily., Disp: 90 tablet, Rfl: 3    fluticasone (Flonase) 50 mcg/actuation nasal spray, Administer 2 sprays into each nostril once daily. Shake gently. Before first use, prime pump. After use, clean  tip and replace cap., Disp: 16 g, Rfl: 3    hydroCHLOROthiazide (Microzide) 12.5 mg tablet, Take 1 tablet (12.5 mg) by mouth once daily., Disp: 90 tablet, Rfl: 3    levothyroxine (Synthroid, Levoxyl) 25 mcg tablet, Take 1 tablet (25 mcg) by mouth once daily., Disp: 90 tablet, Rfl: 3    metFORMIN (Glucophage) 500 mg tablet, Take 1 tablet (500 mg) by mouth 2 times daily (morning and late afternoon)., Disp: 200 tablet, Rfl: 3    nitroglycerin (Nitrostat) 0.4 mg SL tablet, Place 1 tablet (0.4 mg) under the tongue every 5 minutes if needed for chest pain., Disp: 90 tablet, Rfl: 0    sertraline (Zoloft) 100 mg tablet, Take 1 tablet (100 mg) by mouth once daily., Disp: 90 tablet, Rfl: 3    spironolactone (Aldactone) 25 mg tablet, Take 1 tablet (25 mg) by mouth once daily., Disp: 90 tablet, Rfl: 3

## 2025-11-05 ENCOUNTER — APPOINTMENT (OUTPATIENT)
Dept: PRIMARY CARE | Facility: CLINIC | Age: 65
End: 2025-11-05
Payer: COMMERCIAL

## 2026-01-22 ENCOUNTER — APPOINTMENT (OUTPATIENT)
Dept: CARDIOLOGY | Facility: CLINIC | Age: 66
End: 2026-01-22
Payer: COMMERCIAL

## (undated) DEVICE — DEVICE, INFLATION, BASIXSKY, 30ATM BAR 20ML, MAP802 PHD, INSERT TOOL TORQUE, METAL

## (undated) DEVICE — SHEATH, GLIDESHEATH, SLENDER, 6FR 10CM

## (undated) DEVICE — CATHETER, OPTITORQUE, 5FR, JACKY, 3.5/ 2H/110CM, CURVED

## (undated) DEVICE — GUIDEWIRE, RUN THROUGH WIRE, 180CM

## (undated) DEVICE — BAND, VASCULAR, RADIAL HEMOSTAT, REGULAR 24CM

## (undated) DEVICE — TUBING, MANIFOLD, LOW PRESSURE

## (undated) DEVICE — CATHETER, BALLOON, NC EUPHORA NONCOMPLIANT 2.75 X 15 X 142CM

## (undated) DEVICE — CATHETER, VISTA BRITE TIP GUIDE, 6FR 0.070 XB 3 100CM